# Patient Record
Sex: MALE | Race: WHITE | Employment: PART TIME | URBAN - METROPOLITAN AREA
[De-identification: names, ages, dates, MRNs, and addresses within clinical notes are randomized per-mention and may not be internally consistent; named-entity substitution may affect disease eponyms.]

---

## 2017-01-06 ENCOUNTER — HOSPITAL ENCOUNTER (OUTPATIENT)
Dept: GENERAL RADIOLOGY | Age: 57
Discharge: HOME | End: 2017-01-06
Attending: FAMILY MEDICINE | Admitting: FAMILY MEDICINE

## 2017-01-06 DIAGNOSIS — Z13.1 SCREENING FOR DIABETES MELLITUS (DM): ICD-10-CM

## 2017-01-06 DIAGNOSIS — Z11.4 SCREENING FOR HIV (HUMAN IMMUNODEFICIENCY VIRUS): ICD-10-CM

## 2017-01-06 DIAGNOSIS — Z11.59 NEED FOR HEPATITIS C SCREENING TEST: ICD-10-CM

## 2017-01-06 DIAGNOSIS — E78.2 MIXED HYPERLIPIDEMIA: ICD-10-CM

## 2017-01-06 LAB
A/G RATIO: 1.5 (ref 1.1–2.2)
ALBUMIN SERPL-MCNC: 4.5 G/DL (ref 3.4–5)
ALP BLD-CCNC: 70 U/L (ref 40–129)
ALT SERPL-CCNC: 27 U/L (ref 10–40)
ANION GAP SERPL CALCULATED.3IONS-SCNC: 13 MMOL/L (ref 3–16)
AST SERPL-CCNC: 23 U/L (ref 15–37)
BILIRUB SERPL-MCNC: 0.5 MG/DL (ref 0–1)
BUN BLDV-MCNC: 10 MG/DL (ref 7–20)
CALCIUM SERPL-MCNC: 9.2 MG/DL (ref 8.3–10.6)
CHLORIDE BLD-SCNC: 103 MMOL/L (ref 99–110)
CHOLESTEROL, TOTAL: 163 MG/DL (ref 0–199)
CO2: 27 MMOL/L (ref 21–32)
CREAT SERPL-MCNC: 0.9 MG/DL (ref 0.9–1.3)
GFR AFRICAN AMERICAN: >60
GFR NON-AFRICAN AMERICAN: >60
GLOBULIN: 3.1 G/DL
GLUCOSE BLD-MCNC: 96 MG/DL (ref 70–99)
HDLC SERPL-MCNC: 43 MG/DL (ref 40–60)
HEPATITIS C ANTIBODY INTERPRETATION: NORMAL
LDL CHOLESTEROL CALCULATED: 91 MG/DL
POTASSIUM SERPL-SCNC: 4.5 MMOL/L (ref 3.5–5.1)
SODIUM BLD-SCNC: 143 MMOL/L (ref 136–145)
TOTAL PROTEIN: 7.6 G/DL (ref 6.4–8.2)
TRIGL SERPL-MCNC: 145 MG/DL (ref 0–150)
VLDLC SERPL CALC-MCNC: 29 MG/DL

## 2017-01-07 LAB
ESTIMATED AVERAGE GLUCOSE: 108.3 MG/DL
HBA1C MFR BLD: 5.4 %

## 2017-01-09 LAB — HIV-1 AND HIV-2 ANTIBODIES: NORMAL

## 2017-01-13 ENCOUNTER — ANTI-COAG VISIT (OUTPATIENT)
Dept: PHARMACY | Facility: CLINIC | Age: 57
End: 2017-01-13

## 2017-01-13 DIAGNOSIS — Z79.01 LONG TERM CURRENT USE OF ANTICOAGULANT: ICD-10-CM

## 2017-01-13 LAB — INR BLD: 2

## 2017-02-10 ENCOUNTER — ANTI-COAG VISIT (OUTPATIENT)
Dept: PHARMACY | Facility: CLINIC | Age: 57
End: 2017-02-10

## 2017-02-10 DIAGNOSIS — Z79.01 LONG TERM CURRENT USE OF ANTICOAGULANT: ICD-10-CM

## 2017-02-10 LAB — INR BLD: 2.6

## 2017-02-10 RX ORDER — RANITIDINE 150 MG/1
150 TABLET ORAL 2 TIMES DAILY
COMMUNITY
End: 2019-11-05 | Stop reason: ALTCHOICE

## 2017-03-06 ENCOUNTER — EMPLOYEE WELLNESS (OUTPATIENT)
Dept: OTHER | Age: 57
End: 2017-03-06

## 2017-03-10 ENCOUNTER — ANTI-COAG VISIT (OUTPATIENT)
Dept: PHARMACY | Facility: CLINIC | Age: 57
End: 2017-03-10

## 2017-03-10 DIAGNOSIS — Z79.01 LONG TERM CURRENT USE OF ANTICOAGULANT: ICD-10-CM

## 2017-03-10 LAB — INR BLD: 2.7

## 2017-03-10 RX ORDER — WARFARIN SODIUM 5 MG/1
TABLET ORAL
Qty: 90 TABLET | Refills: 1 | Status: SHIPPED | OUTPATIENT
Start: 2017-03-10 | End: 2018-01-02 | Stop reason: SDUPTHER

## 2017-04-07 ENCOUNTER — ANTI-COAG VISIT (OUTPATIENT)
Dept: PHARMACY | Facility: CLINIC | Age: 57
End: 2017-04-07

## 2017-04-07 DIAGNOSIS — Z79.01 LONG TERM CURRENT USE OF ANTICOAGULANT: ICD-10-CM

## 2017-04-07 LAB — INR BLD: 2.9

## 2017-05-05 ENCOUNTER — ANTI-COAG VISIT (OUTPATIENT)
Dept: PHARMACY | Facility: CLINIC | Age: 57
End: 2017-05-05

## 2017-05-05 DIAGNOSIS — Z79.01 LONG TERM CURRENT USE OF ANTICOAGULANT: ICD-10-CM

## 2017-05-05 LAB — INR BLD: 2.4

## 2017-05-17 ENCOUNTER — OFFICE VISIT (OUTPATIENT)
Dept: INTERNAL MEDICINE CLINIC | Age: 57
End: 2017-05-17

## 2017-05-17 VITALS
TEMPERATURE: 99 F | HEART RATE: 88 BPM | OXYGEN SATURATION: 98 % | DIASTOLIC BLOOD PRESSURE: 100 MMHG | BODY MASS INDEX: 28.75 KG/M2 | SYSTOLIC BLOOD PRESSURE: 128 MMHG | WEIGHT: 230 LBS

## 2017-05-17 DIAGNOSIS — J06.9 VIRAL URI WITH COUGH: Primary | ICD-10-CM

## 2017-05-17 PROCEDURE — 99214 OFFICE O/P EST MOD 30 MIN: CPT | Performed by: FAMILY MEDICINE

## 2017-05-17 RX ORDER — BENZONATATE 100 MG/1
100-200 CAPSULE ORAL 3 TIMES DAILY PRN
Qty: 30 CAPSULE | Refills: 0 | Status: SHIPPED | OUTPATIENT
Start: 2017-05-17 | End: 2017-09-14 | Stop reason: ALTCHOICE

## 2017-05-21 ASSESSMENT — ENCOUNTER SYMPTOMS
BACK PAIN: 0
RHINORRHEA: 1
VOMITING: 0
COUGH: 1
EYE DISCHARGE: 0
SORE THROAT: 0
SINUS PRESSURE: 0
DIARRHEA: 0
NAUSEA: 0
CHEST TIGHTNESS: 0

## 2017-06-02 ENCOUNTER — ANTI-COAG VISIT (OUTPATIENT)
Dept: PHARMACY | Facility: CLINIC | Age: 57
End: 2017-06-02

## 2017-06-02 DIAGNOSIS — Z79.01 LONG TERM CURRENT USE OF ANTICOAGULANT: ICD-10-CM

## 2017-06-02 LAB — INR BLD: 2.3

## 2017-07-07 ENCOUNTER — ANTI-COAG VISIT (OUTPATIENT)
Dept: PHARMACY | Facility: CLINIC | Age: 57
End: 2017-07-07

## 2017-07-07 DIAGNOSIS — Z79.01 LONG TERM CURRENT USE OF ANTICOAGULANT: ICD-10-CM

## 2017-07-07 LAB — INR BLD: 2.6

## 2017-08-04 ENCOUNTER — ANTI-COAG VISIT (OUTPATIENT)
Dept: PHARMACY | Facility: CLINIC | Age: 57
End: 2017-08-04

## 2017-08-04 DIAGNOSIS — Z79.01 LONG TERM CURRENT USE OF ANTICOAGULANT: ICD-10-CM

## 2017-08-04 LAB — INR BLD: 2.5

## 2017-09-01 ENCOUNTER — ANTI-COAG VISIT (OUTPATIENT)
Dept: PHARMACY | Facility: CLINIC | Age: 57
End: 2017-09-01

## 2017-09-01 ENCOUNTER — HOSPITAL ENCOUNTER (OUTPATIENT)
Dept: OTHER | Age: 57
Discharge: HOME | End: 2017-09-30
Attending: FAMILY MEDICINE | Admitting: FAMILY MEDICINE

## 2017-09-01 DIAGNOSIS — I26.09 OTHER PULMONARY EMBOLISM WITH ACUTE COR PULMONALE, UNSPECIFIED CHRONICITY (HCC): ICD-10-CM

## 2017-09-01 DIAGNOSIS — Z79.01 LONG TERM CURRENT USE OF ANTICOAGULANT: ICD-10-CM

## 2017-09-01 LAB — INR BLD: 2.8

## 2017-09-14 ENCOUNTER — OFFICE VISIT (OUTPATIENT)
Dept: DERMATOLOGY | Age: 57
End: 2017-09-14

## 2017-09-14 DIAGNOSIS — D22.9 MULTIPLE BENIGN NEVI: ICD-10-CM

## 2017-09-14 DIAGNOSIS — Z12.83 SCREENING EXAM FOR SKIN CANCER: ICD-10-CM

## 2017-09-14 DIAGNOSIS — L91.8 SKIN TAG: ICD-10-CM

## 2017-09-14 DIAGNOSIS — L57.0 ACTINIC KERATOSES: ICD-10-CM

## 2017-09-14 DIAGNOSIS — L82.1 SEBORRHEIC KERATOSES: ICD-10-CM

## 2017-09-14 DIAGNOSIS — D48.5 NEOPLASM OF UNCERTAIN BEHAVIOR OF SKIN: Primary | ICD-10-CM

## 2017-09-14 PROCEDURE — 99243 OFF/OP CNSLTJ NEW/EST LOW 30: CPT | Performed by: DERMATOLOGY

## 2017-09-14 PROCEDURE — 17003 DESTRUCT PREMALG LES 2-14: CPT | Performed by: DERMATOLOGY

## 2017-09-14 PROCEDURE — 11100 PR BIOPSY OF SKIN LESION: CPT | Performed by: DERMATOLOGY

## 2017-09-14 PROCEDURE — 17000 DESTRUCT PREMALG LESION: CPT | Performed by: DERMATOLOGY

## 2017-09-28 ENCOUNTER — TELEPHONE (OUTPATIENT)
Dept: DERMATOLOGY | Age: 57
End: 2017-09-28

## 2017-10-06 ENCOUNTER — ANTI-COAG VISIT (OUTPATIENT)
Dept: PHARMACY | Facility: CLINIC | Age: 57
End: 2017-10-06

## 2017-10-06 LAB — INR BLD: 2.4

## 2017-10-06 NOTE — PROGRESS NOTES
Mr. Dakota Lora has hx of multiple PE s/p 11/2009. 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 He presents today w/out complaint. New onset PE on 6/3/12. Pt. denies any s/s bleeding/bruising/ swelling/SOB. No BRBPR. No melena. No missed doses. No Rx/OTC/Herbal supplement changes. No EtOH changes; pt states that he does not drink alcohol. No changes in diet. INR 2.4 is within therapeutic goal range of 2-3. Recommend to continue 5 mg daily. Pt has 5mg tablets. Will continue to monitor and check INR in 4 weeks. Dosing reminder card given with phone number, appointment date and time.    Return to clinic: 11/3 @ 10:00 am    Hal Morgan, PharmD 10/6/2017 10:01 AM

## 2017-11-01 ENCOUNTER — HOSPITAL ENCOUNTER (OUTPATIENT)
Dept: OTHER | Age: 57
Discharge: HOME | End: 2017-11-30
Attending: FAMILY MEDICINE | Admitting: FAMILY MEDICINE

## 2017-11-03 ENCOUNTER — ANTI-COAG VISIT (OUTPATIENT)
Dept: PHARMACY | Facility: CLINIC | Age: 57
End: 2017-11-03

## 2017-11-03 DIAGNOSIS — Z79.01 LONG TERM CURRENT USE OF ANTICOAGULANT: ICD-10-CM

## 2017-11-03 LAB — INR BLD: 2.5

## 2017-11-03 NOTE — PROGRESS NOTES
Mr. Dakota Lora has hx of multiple PE s/p 11/2009. 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 He presents today w/out complaint. New onset PE on 6/3/12. Pt. denies any s/s bleeding/bruising/ swelling/SOB. No BRBPR. No melena. No missed doses. No Rx/OTC/Herbal supplement changes. No EtOH changes; pt states that he does not drink alcohol. No changes in diet. INR 2.5 is within therapeutic goal range of 2-3. Recommend to continue 5 mg daily. Pt has 5mg tablets. Will continue to monitor and check INR in 4 weeks. Dosing reminder card given with phone number, appointment date and time.    Return to clinic: 12/1 @ 10:15 am

## 2017-12-01 ENCOUNTER — ANTI-COAG VISIT (OUTPATIENT)
Dept: PHARMACY | Facility: CLINIC | Age: 57
End: 2017-12-01

## 2017-12-01 ENCOUNTER — HOSPITAL ENCOUNTER (OUTPATIENT)
Dept: OTHER | Age: 57
Discharge: HOME | End: 2017-12-31
Attending: FAMILY MEDICINE | Admitting: FAMILY MEDICINE

## 2017-12-01 DIAGNOSIS — Z79.01 LONG TERM CURRENT USE OF ANTICOAGULANT: ICD-10-CM

## 2017-12-01 LAB — INR BLD: 2.6

## 2017-12-01 NOTE — PROGRESS NOTES
Mr. Rajan Reyes has hx of multiple PE s/p 11/2009. 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 He presents today w/out complaint. New onset PE on 6/3/12. Pt. denies any s/s bleeding/bruising/ swelling/SOB. No BRBPR. No melena. No missed doses. No Rx/OTC/Herbal supplement changes. No EtOH changes; pt states that he does not drink alcohol. No changes in diet. INR 2.5 is within therapeutic goal range of 2-3. Recommend to continue 5 mg daily. Pt has 5mg tablets. Will continue to monitor and check INR in 4 weeks. Dosing reminder card given with phone number, appointment date and time.    Return to clinic: 12/1 @ 10:15 am

## 2018-01-02 ENCOUNTER — OFFICE VISIT (OUTPATIENT)
Dept: INTERNAL MEDICINE CLINIC | Age: 58
End: 2018-01-02

## 2018-01-02 VITALS
BODY MASS INDEX: 28.01 KG/M2 | OXYGEN SATURATION: 96 % | WEIGHT: 230 LBS | SYSTOLIC BLOOD PRESSURE: 120 MMHG | HEART RATE: 72 BPM | HEIGHT: 76 IN | DIASTOLIC BLOOD PRESSURE: 76 MMHG

## 2018-01-02 DIAGNOSIS — E78.2 MIXED HYPERLIPIDEMIA: ICD-10-CM

## 2018-01-02 DIAGNOSIS — Z00.00 ROUTINE PHYSICAL EXAMINATION: Primary | ICD-10-CM

## 2018-01-02 DIAGNOSIS — Z86.718 PERSONAL HISTORY OF DVT (DEEP VEIN THROMBOSIS): ICD-10-CM

## 2018-01-02 DIAGNOSIS — K21.9 GASTROESOPHAGEAL REFLUX DISEASE WITHOUT ESOPHAGITIS: ICD-10-CM

## 2018-01-02 DIAGNOSIS — L98.9 SKIN LESION OF FOOT: ICD-10-CM

## 2018-01-02 DIAGNOSIS — Z79.01 LONG TERM CURRENT USE OF ANTICOAGULANT: ICD-10-CM

## 2018-01-02 DIAGNOSIS — Z12.5 SCREENING FOR PROSTATE CANCER: ICD-10-CM

## 2018-01-02 PROCEDURE — 99396 PREV VISIT EST AGE 40-64: CPT | Performed by: FAMILY MEDICINE

## 2018-01-02 RX ORDER — ATORVASTATIN CALCIUM 40 MG/1
TABLET, FILM COATED ORAL
Qty: 90 TABLET | Refills: 3 | Status: SHIPPED | OUTPATIENT
Start: 2018-01-02 | End: 2019-01-08 | Stop reason: SDUPTHER

## 2018-01-02 RX ORDER — WARFARIN SODIUM 5 MG/1
5 TABLET ORAL DAILY
Qty: 90 TABLET | Refills: 3 | Status: SHIPPED | OUTPATIENT
Start: 2018-01-02 | End: 2018-10-01 | Stop reason: SDUPTHER

## 2018-01-02 ASSESSMENT — ENCOUNTER SYMPTOMS
VOMITING: 0
DIARRHEA: 0
SHORTNESS OF BREATH: 1
ABDOMINAL PAIN: 0
CONSTIPATION: 0
RHINORRHEA: 0
SORE THROAT: 0
NAUSEA: 0
WHEEZING: 0
BACK PAIN: 0
COUGH: 0
TROUBLE SWALLOWING: 0

## 2018-01-02 NOTE — PATIENT INSTRUCTIONS
Try taking zantac 300 mg in the am (instead of 150 mg twice daily)  Can take an additional prilosec 20 mg if needed for a couple weeks    Go for fasting bloodwork on Friday

## 2018-01-05 ENCOUNTER — HOSPITAL ENCOUNTER (OUTPATIENT)
Dept: OTHER | Age: 58
Discharge: HOME | End: 2018-01-31
Attending: FAMILY MEDICINE | Admitting: FAMILY MEDICINE

## 2018-01-05 ENCOUNTER — ANTI-COAG VISIT (OUTPATIENT)
Dept: PHARMACY | Facility: CLINIC | Age: 58
End: 2018-01-05

## 2018-01-05 ENCOUNTER — HOSPITAL ENCOUNTER (OUTPATIENT)
Dept: GENERAL RADIOLOGY | Age: 58
Discharge: HOME | End: 2018-01-05
Attending: FAMILY MEDICINE | Admitting: FAMILY MEDICINE

## 2018-01-05 DIAGNOSIS — Z79.01 LONG TERM CURRENT USE OF ANTICOAGULANT: ICD-10-CM

## 2018-01-05 DIAGNOSIS — Z00.00 ROUTINE PHYSICAL EXAMINATION: ICD-10-CM

## 2018-01-05 DIAGNOSIS — Z12.5 SCREENING FOR PROSTATE CANCER: ICD-10-CM

## 2018-01-05 LAB
A/G RATIO: 1.4 (ref 1.1–2.2)
ALBUMIN SERPL-MCNC: 4.7 G/DL (ref 3.4–5)
ALP BLD-CCNC: 73 U/L (ref 40–129)
ALT SERPL-CCNC: 26 U/L (ref 10–40)
ANION GAP SERPL CALCULATED.3IONS-SCNC: 13 MMOL/L (ref 3–16)
AST SERPL-CCNC: 23 U/L (ref 15–37)
BILIRUB SERPL-MCNC: 0.6 MG/DL (ref 0–1)
BUN BLDV-MCNC: 12 MG/DL (ref 7–20)
CALCIUM SERPL-MCNC: 9.7 MG/DL (ref 8.3–10.6)
CHLORIDE BLD-SCNC: 103 MMOL/L (ref 99–110)
CHOLESTEROL, TOTAL: 169 MG/DL (ref 0–199)
CO2: 26 MMOL/L (ref 21–32)
CREAT SERPL-MCNC: 0.8 MG/DL (ref 0.9–1.3)
GFR AFRICAN AMERICAN: >60
GFR NON-AFRICAN AMERICAN: >60
GLOBULIN: 3.4 G/DL
GLUCOSE BLD-MCNC: 84 MG/DL (ref 70–99)
HDLC SERPL-MCNC: 44 MG/DL (ref 40–60)
INR BLD: 2.5
LDL CHOLESTEROL CALCULATED: 94 MG/DL
POTASSIUM SERPL-SCNC: 4.3 MMOL/L (ref 3.5–5.1)
PROSTATE SPECIFIC ANTIGEN: 0.64 NG/ML (ref 0–4)
SODIUM BLD-SCNC: 142 MMOL/L (ref 136–145)
TOTAL PROTEIN: 8.1 G/DL (ref 6.4–8.2)
TRIGL SERPL-MCNC: 157 MG/DL (ref 0–150)
VLDLC SERPL CALC-MCNC: 31 MG/DL

## 2018-01-05 NOTE — PROGRESS NOTES
Mr. Aracelis Castañeda has hx of multiple PE s/p 11/2009. 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 He presents today w/out complaint. New onset PE on 6/3/12. Pt. denies any s/s bleeding/bruising/ swelling/SOB. No BRBPR. No melena. No missed doses. No Rx/OTC/Herbal supplement changes. No EtOH changes; pt states that he does not drink alcohol. No changes in diet. INR 2.5 is within therapeutic goal range of 2-3. Recommend to continue 5 mg daily. Pt has 5mg tablets. Will continue to monitor and check INR in 4 weeks. Dosing reminder card given with phone number, appointment date and time. Return to clinic: 2/2 at 21 [phone]     I have seen the patient and reviewed the progress note written by the PharmD Candidate. I agree with this assessment and plan.    Jh Chavez, PharmD 1/5/2018 11:07 AM

## 2018-02-01 ENCOUNTER — HOSPITAL ENCOUNTER (OUTPATIENT)
Dept: OTHER | Age: 58
Discharge: HOME | End: 2018-02-28
Attending: FAMILY MEDICINE | Admitting: FAMILY MEDICINE

## 2018-02-02 ENCOUNTER — ANTI-COAG VISIT (OUTPATIENT)
Dept: PHARMACY | Facility: CLINIC | Age: 58
End: 2018-02-02

## 2018-02-02 DIAGNOSIS — I26.99 OTHER PULMONARY EMBOLISM WITHOUT ACUTE COR PULMONALE, UNSPECIFIED CHRONICITY (HCC): ICD-10-CM

## 2018-02-02 DIAGNOSIS — Z79.01 LONG TERM CURRENT USE OF ANTICOAGULANT: ICD-10-CM

## 2018-02-02 LAB — INR BLD: 2.6

## 2018-03-01 ENCOUNTER — HOSPITAL ENCOUNTER (OUTPATIENT)
Dept: OTHER | Age: 58
Discharge: HOME | End: 2018-03-31
Attending: FAMILY MEDICINE | Admitting: FAMILY MEDICINE

## 2018-03-09 ENCOUNTER — ANTI-COAG VISIT (OUTPATIENT)
Dept: PHARMACY | Facility: CLINIC | Age: 58
End: 2018-03-09

## 2018-03-09 LAB — INR BLD: 2.8

## 2018-03-09 NOTE — PROGRESS NOTES
Mr. Daryl Lloyd has hx of multiple PE s/p 11/2009. 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 He presents today w/out complaint. New onset PE on 6/3/12. Pt. denies any s/s bleeding/bruising/ swelling/SOB. No BRBPR. No melena. No missed doses. No Rx/OTC/Herbal supplement changes. No EtOH changes; pt states that he does not drink alcohol. No changes in diet. Reports no changes with medications or diet since last visit. INR 2.8 is within therapeutic goal range of 2-3. Recommend to continue 5 mg daily. Pt has 5 mg tablets. Will continue to monitor and check INR in 4 weeks. Dosing reminder card given with phone number, appointment date and time.    Return to clinic: 4/6 @ 1:15pm

## 2018-03-20 VITALS — WEIGHT: 234 LBS | BODY MASS INDEX: 29.25 KG/M2

## 2018-04-01 ENCOUNTER — HOSPITAL ENCOUNTER (OUTPATIENT)
Dept: OTHER | Age: 58
Discharge: HOME | End: 2018-04-30
Attending: FAMILY MEDICINE | Admitting: FAMILY MEDICINE

## 2018-04-02 ENCOUNTER — EMPLOYEE WELLNESS (OUTPATIENT)
Dept: OTHER | Age: 58
End: 2018-04-02

## 2018-04-02 LAB
CHOLESTEROL, TOTAL: 153 MG/DL (ref 0–199)
GLUCOSE BLD-MCNC: 102 MG/DL (ref 70–99)
HDLC SERPL-MCNC: 40 MG/DL (ref 40–60)
LDL CHOLESTEROL CALCULATED: 86 MG/DL
TRIGL SERPL-MCNC: 135 MG/DL (ref 0–150)

## 2018-04-06 ENCOUNTER — ANTI-COAG VISIT (OUTPATIENT)
Dept: PHARMACY | Facility: CLINIC | Age: 58
End: 2018-04-06

## 2018-04-06 DIAGNOSIS — I26.99 OTHER PULMONARY EMBOLISM WITHOUT ACUTE COR PULMONALE, UNSPECIFIED CHRONICITY (HCC): ICD-10-CM

## 2018-04-06 DIAGNOSIS — Z79.01 LONG TERM CURRENT USE OF ANTICOAGULANT: ICD-10-CM

## 2018-04-06 LAB — INR BLD: 3.1

## 2018-04-10 VITALS — WEIGHT: 234 LBS | BODY MASS INDEX: 28.48 KG/M2

## 2018-05-01 ENCOUNTER — HOSPITAL ENCOUNTER (OUTPATIENT)
Dept: OTHER | Age: 58
Discharge: HOME | End: 2018-05-31
Attending: FAMILY MEDICINE | Admitting: FAMILY MEDICINE

## 2018-05-04 ENCOUNTER — ANTI-COAG VISIT (OUTPATIENT)
Dept: PHARMACY | Facility: CLINIC | Age: 58
End: 2018-05-04

## 2018-05-04 DIAGNOSIS — Z79.01 LONG TERM CURRENT USE OF ANTICOAGULANT: ICD-10-CM

## 2018-05-04 LAB — INR BLD: 3

## 2018-06-01 ENCOUNTER — ANTI-COAG VISIT (OUTPATIENT)
Dept: PHARMACY | Facility: CLINIC | Age: 58
End: 2018-06-01

## 2018-06-01 ENCOUNTER — HOSPITAL ENCOUNTER (OUTPATIENT)
Dept: OTHER | Age: 58
Discharge: HOME | End: 2018-06-30
Attending: FAMILY MEDICINE | Admitting: FAMILY MEDICINE

## 2018-06-01 DIAGNOSIS — Z79.01 LONG TERM CURRENT USE OF ANTICOAGULANT: ICD-10-CM

## 2018-06-01 LAB — INR BLD: 2.7

## 2018-06-27 ENCOUNTER — OFFICE VISIT (OUTPATIENT)
Dept: INTERNAL MEDICINE CLINIC | Age: 58
End: 2018-06-27

## 2018-06-27 ENCOUNTER — TELEPHONE (OUTPATIENT)
Dept: INTERNAL MEDICINE CLINIC | Age: 58
End: 2018-06-27

## 2018-06-27 VITALS
HEART RATE: 86 BPM | WEIGHT: 228 LBS | SYSTOLIC BLOOD PRESSURE: 122 MMHG | DIASTOLIC BLOOD PRESSURE: 82 MMHG | BODY MASS INDEX: 27.75 KG/M2 | OXYGEN SATURATION: 98 %

## 2018-06-27 DIAGNOSIS — Z86.718 PERSONAL HISTORY OF DVT (DEEP VEIN THROMBOSIS): ICD-10-CM

## 2018-06-27 DIAGNOSIS — M94.0 ACUTE COSTOCHONDRITIS: ICD-10-CM

## 2018-06-27 DIAGNOSIS — Z79.01 LONG TERM CURRENT USE OF ANTICOAGULANT: ICD-10-CM

## 2018-06-27 DIAGNOSIS — J06.9 VIRAL URI WITH COUGH: Primary | ICD-10-CM

## 2018-06-27 LAB
INTERNATIONAL NORMALIZATION RATIO, POC: 2.6
PROTHROMBIN TIME, POC: NORMAL

## 2018-06-27 PROCEDURE — 85610 PROTHROMBIN TIME: CPT | Performed by: FAMILY MEDICINE

## 2018-06-27 PROCEDURE — 99214 OFFICE O/P EST MOD 30 MIN: CPT | Performed by: FAMILY MEDICINE

## 2018-06-27 RX ORDER — BENZONATATE 100 MG/1
100-200 CAPSULE ORAL 3 TIMES DAILY PRN
Qty: 30 CAPSULE | Refills: 0 | Status: SHIPPED | OUTPATIENT
Start: 2018-06-27 | End: 2018-07-27 | Stop reason: ALTCHOICE

## 2018-06-27 RX ORDER — PREDNISONE 20 MG/1
40 TABLET ORAL DAILY
Qty: 10 TABLET | Refills: 0 | Status: SHIPPED | OUTPATIENT
Start: 2018-06-27 | End: 2018-07-02

## 2018-06-27 RX ORDER — PREDNISONE 20 MG/1
40 TABLET ORAL DAILY
Qty: 10 TABLET | Refills: 0 | Status: SHIPPED | OUTPATIENT
Start: 2018-06-27 | End: 2018-06-27 | Stop reason: SDUPTHER

## 2018-06-27 ASSESSMENT — PATIENT HEALTH QUESTIONNAIRE - PHQ9
2. FEELING DOWN, DEPRESSED OR HOPELESS: 0
SUM OF ALL RESPONSES TO PHQ QUESTIONS 1-9: 0
SUM OF ALL RESPONSES TO PHQ9 QUESTIONS 1 & 2: 0
1. LITTLE INTEREST OR PLEASURE IN DOING THINGS: 0

## 2018-06-27 NOTE — PROGRESS NOTES
HENT:   Head: Normocephalic and atraumatic. Right Ear: External ear normal.   Left Ear: External ear normal.   Mouth/Throat: Oropharynx is clear and moist. No oropharyngeal exudate. Boggy nasal turbinates   Eyes: Conjunctivae are normal. Right eye exhibits no discharge. Left eye exhibits no discharge. Neck: Normal range of motion. Neck supple. Cardiovascular: Normal rate, regular rhythm and normal heart sounds. Pulmonary/Chest: Effort normal and breath sounds normal. No respiratory distress. He exhibits tenderness (along right side of sternum and in intercostal spaces on the right). Abdominal: Soft. Bowel sounds are normal. There is no tenderness. Musculoskeletal: Normal range of motion. Lymphadenopathy:     He has no cervical adenopathy. Neurological: He is alert and oriented to person, place, and time. Skin: Skin is warm and dry. No rash noted. He is not diaphoretic. Psychiatric: He has a normal mood and affect. His behavior is normal. Judgment and thought content normal.   Vitals reviewed. Vitals:    06/27/18 1423   BP: 122/82   Site: Right Arm   Position: Sitting   Cuff Size: Large Adult   Pulse: 86   SpO2: 98%   Weight: 228 lb (103.4 kg)     Body mass index is 27.75 kg/m². Assessment/Plan:    1. Viral URI with cough  Rec supportive therapy with fluids, nasal saline, and OTC analgesics/antipyretics prn,  -return if sx's worsen after initial improvement and/or last longer than 7-10 days  -gave note for work    - benzonatate (TESSALON PERLES) 100 MG capsule; Take 1-2 capsules by mouth 3 times daily as needed for Cough  Dispense: 30 capsule; Refill: 0    2. Acute costochondritis  Reassured him that this is unrelated to previous surgery  Rec tylenol for pain (need to avoid NSAIDs with coumadin use)   Call back if pain worsens and I can send an Rx for oral steroid    3. Personal history of DVT (deep vein thrombosis)  4.  Long term current use of anticoagulant  - POCT INR was

## 2018-06-28 ENCOUNTER — TELEPHONE (OUTPATIENT)
Dept: INTERNAL MEDICINE CLINIC | Age: 58
End: 2018-06-28

## 2018-07-01 ENCOUNTER — HOSPITAL ENCOUNTER (OUTPATIENT)
Dept: OTHER | Age: 58
Discharge: HOME | End: 2018-07-01
Attending: FAMILY MEDICINE | Admitting: FAMILY MEDICINE

## 2018-07-02 ASSESSMENT — ENCOUNTER SYMPTOMS
NAUSEA: 0
EYE DISCHARGE: 0
COUGH: 1
SINUS PRESSURE: 0
DIARRHEA: 0
VOMITING: 0
SORE THROAT: 0
RHINORRHEA: 1
CHEST TIGHTNESS: 1
CHOKING: 0

## 2018-07-06 ENCOUNTER — ANTI-COAG VISIT (OUTPATIENT)
Dept: PHARMACY | Facility: CLINIC | Age: 58
End: 2018-07-06

## 2018-07-06 DIAGNOSIS — I27.82 OTHER CHRONIC PULMONARY EMBOLISM WITHOUT ACUTE COR PULMONALE (HCC): ICD-10-CM

## 2018-07-06 DIAGNOSIS — Z79.01 LONG TERM CURRENT USE OF ANTICOAGULANT: ICD-10-CM

## 2018-07-06 LAB — INR BLD: 3.6

## 2018-07-20 ENCOUNTER — ANTI-COAG VISIT (OUTPATIENT)
Dept: PHARMACY | Age: 58
End: 2018-07-20
Payer: COMMERCIAL

## 2018-07-20 DIAGNOSIS — I26.99 OTHER PULMONARY EMBOLISM WITHOUT ACUTE COR PULMONALE, UNSPECIFIED CHRONICITY (HCC): ICD-10-CM

## 2018-07-20 DIAGNOSIS — Z79.01 LONG TERM CURRENT USE OF ANTICOAGULANT: ICD-10-CM

## 2018-07-20 LAB — INR BLD: 2.7

## 2018-07-20 PROCEDURE — 99211 OFF/OP EST MAY X REQ PHY/QHP: CPT

## 2018-07-20 PROCEDURE — 85610 PROTHROMBIN TIME: CPT

## 2018-07-27 ENCOUNTER — APPOINTMENT (OUTPATIENT)
Dept: CT IMAGING | Age: 58
End: 2018-07-27
Payer: COMMERCIAL

## 2018-07-27 ENCOUNTER — HOSPITAL ENCOUNTER (EMERGENCY)
Age: 58
Discharge: HOME | End: 2018-07-27
Attending: EMERGENCY MEDICINE
Payer: COMMERCIAL

## 2018-07-27 ENCOUNTER — APPOINTMENT (OUTPATIENT)
Dept: GENERAL RADIOLOGY | Age: 58
End: 2018-07-27
Payer: COMMERCIAL

## 2018-07-27 VITALS
DIASTOLIC BLOOD PRESSURE: 89 MMHG | HEIGHT: 76 IN | HEART RATE: 70 BPM | RESPIRATION RATE: 16 BRPM | BODY MASS INDEX: 27.76 KG/M2 | WEIGHT: 228 LBS | OXYGEN SATURATION: 97 % | TEMPERATURE: 98.9 F | SYSTOLIC BLOOD PRESSURE: 127 MMHG

## 2018-07-27 DIAGNOSIS — R07.9 CHEST PAIN, UNSPECIFIED TYPE: ICD-10-CM

## 2018-07-27 DIAGNOSIS — F41.0 ANXIETY ATTACK: Primary | ICD-10-CM

## 2018-07-27 LAB
A/G RATIO: 1.4 (ref 1.1–2.2)
ALBUMIN SERPL-MCNC: 4.3 G/DL (ref 3.4–5)
ALP BLD-CCNC: 74 U/L (ref 40–129)
ALT SERPL-CCNC: 26 U/L (ref 10–40)
ANION GAP SERPL CALCULATED.3IONS-SCNC: 14 MMOL/L (ref 3–16)
AST SERPL-CCNC: 22 U/L (ref 15–37)
BASOPHILS ABSOLUTE: 0 K/UL (ref 0–0.2)
BASOPHILS RELATIVE PERCENT: 0.7 %
BILIRUB SERPL-MCNC: 0.5 MG/DL (ref 0–1)
BUN BLDV-MCNC: 10 MG/DL (ref 7–20)
CALCIUM SERPL-MCNC: 8.9 MG/DL (ref 8.3–10.6)
CHLORIDE BLD-SCNC: 105 MMOL/L (ref 99–110)
CO2: 23 MMOL/L (ref 21–32)
CREAT SERPL-MCNC: 0.7 MG/DL (ref 0.9–1.3)
EKG ATRIAL RATE: 85 BPM
EKG DIAGNOSIS: NORMAL
EKG P AXIS: 27 DEGREES
EKG P-R INTERVAL: 148 MS
EKG Q-T INTERVAL: 364 MS
EKG QRS DURATION: 84 MS
EKG QTC CALCULATION (BAZETT): 433 MS
EKG R AXIS: -8 DEGREES
EKG T AXIS: 45 DEGREES
EKG VENTRICULAR RATE: 85 BPM
EOSINOPHILS ABSOLUTE: 0.2 K/UL (ref 0–0.6)
EOSINOPHILS RELATIVE PERCENT: 2.4 %
GFR AFRICAN AMERICAN: >60
GFR NON-AFRICAN AMERICAN: >60
GLOBULIN: 3.1 G/DL
GLUCOSE BLD-MCNC: 93 MG/DL (ref 70–99)
HCT VFR BLD CALC: 47.5 % (ref 40.5–52.5)
HEMOGLOBIN: 16.5 G/DL (ref 13.5–17.5)
INR BLD: 2.32 (ref 0.86–1.14)
LYMPHOCYTES ABSOLUTE: 2 K/UL (ref 1–5.1)
LYMPHOCYTES RELATIVE PERCENT: 30.1 %
MCH RBC QN AUTO: 31.3 PG (ref 26–34)
MCHC RBC AUTO-ENTMCNC: 34.7 G/DL (ref 31–36)
MCV RBC AUTO: 90.3 FL (ref 80–100)
MONOCYTES ABSOLUTE: 0.5 K/UL (ref 0–1.3)
MONOCYTES RELATIVE PERCENT: 8.2 %
NEUTROPHILS ABSOLUTE: 3.8 K/UL (ref 1.7–7.7)
NEUTROPHILS RELATIVE PERCENT: 58.6 %
PDW BLD-RTO: 13.1 % (ref 12.4–15.4)
PLATELET # BLD: 257 K/UL (ref 135–450)
PMV BLD AUTO: 8.7 FL (ref 5–10.5)
POTASSIUM SERPL-SCNC: 3.9 MMOL/L (ref 3.5–5.1)
PRO-BNP: 15 PG/ML (ref 0–124)
PROTHROMBIN TIME: 26.4 SEC (ref 9.8–13)
RBC # BLD: 5.26 M/UL (ref 4.2–5.9)
SODIUM BLD-SCNC: 142 MMOL/L (ref 136–145)
TOTAL PROTEIN: 7.4 G/DL (ref 6.4–8.2)
TROPONIN: <0.01 NG/ML
TROPONIN: <0.01 NG/ML
WBC # BLD: 6.5 K/UL (ref 4–11)

## 2018-07-27 PROCEDURE — 6370000000 HC RX 637 (ALT 250 FOR IP): Performed by: PHYSICIAN ASSISTANT

## 2018-07-27 PROCEDURE — 71046 X-RAY EXAM CHEST 2 VIEWS: CPT

## 2018-07-27 PROCEDURE — 93005 ELECTROCARDIOGRAM TRACING: CPT | Performed by: PHYSICIAN ASSISTANT

## 2018-07-27 PROCEDURE — 70450 CT HEAD/BRAIN W/O DYE: CPT

## 2018-07-27 PROCEDURE — 71275 CT ANGIOGRAPHY CHEST: CPT

## 2018-07-27 PROCEDURE — 96361 HYDRATE IV INFUSION ADD-ON: CPT

## 2018-07-27 PROCEDURE — 93010 ELECTROCARDIOGRAM REPORT: CPT | Performed by: INTERNAL MEDICINE

## 2018-07-27 PROCEDURE — 99284 EMERGENCY DEPT VISIT MOD MDM: CPT

## 2018-07-27 PROCEDURE — 96360 HYDRATION IV INFUSION INIT: CPT

## 2018-07-27 PROCEDURE — 80053 COMPREHEN METABOLIC PANEL: CPT

## 2018-07-27 PROCEDURE — 93005 ELECTROCARDIOGRAM TRACING: CPT | Performed by: EMERGENCY MEDICINE

## 2018-07-27 PROCEDURE — 2580000003 HC RX 258: Performed by: PHYSICIAN ASSISTANT

## 2018-07-27 PROCEDURE — 85025 COMPLETE CBC W/AUTO DIFF WBC: CPT

## 2018-07-27 PROCEDURE — 36415 COLL VENOUS BLD VENIPUNCTURE: CPT

## 2018-07-27 PROCEDURE — 83880 ASSAY OF NATRIURETIC PEPTIDE: CPT

## 2018-07-27 PROCEDURE — 6360000004 HC RX CONTRAST MEDICATION: Performed by: EMERGENCY MEDICINE

## 2018-07-27 PROCEDURE — 6370000000 HC RX 637 (ALT 250 FOR IP): Performed by: EMERGENCY MEDICINE

## 2018-07-27 PROCEDURE — 85610 PROTHROMBIN TIME: CPT

## 2018-07-27 PROCEDURE — 84484 ASSAY OF TROPONIN QUANT: CPT

## 2018-07-27 RX ORDER — HYDROXYZINE HYDROCHLORIDE 25 MG/1
25 TABLET, FILM COATED ORAL 3 TIMES DAILY PRN
Qty: 21 TABLET | Refills: 0 | Status: SHIPPED | OUTPATIENT
Start: 2018-07-27 | End: 2018-08-06

## 2018-07-27 RX ORDER — ASPIRIN 81 MG/1
324 TABLET, CHEWABLE ORAL ONCE
Status: COMPLETED | OUTPATIENT
Start: 2018-07-27 | End: 2018-07-27

## 2018-07-27 RX ORDER — 0.9 % SODIUM CHLORIDE 0.9 %
1000 INTRAVENOUS SOLUTION INTRAVENOUS ONCE
Status: COMPLETED | OUTPATIENT
Start: 2018-07-27 | End: 2018-07-27

## 2018-07-27 RX ORDER — HYDROXYZINE PAMOATE 25 MG/1
25 CAPSULE ORAL ONCE
Status: COMPLETED | OUTPATIENT
Start: 2018-07-27 | End: 2018-07-27

## 2018-07-27 RX ORDER — OMEPRAZOLE 10 MG/1
10 CAPSULE, DELAYED RELEASE ORAL DAILY
COMMUNITY

## 2018-07-27 RX ADMIN — IOPAMIDOL 75 ML: 755 INJECTION, SOLUTION INTRAVENOUS at 16:41

## 2018-07-27 RX ADMIN — ASPIRIN 324 MG: 81 TABLET, CHEWABLE ORAL at 16:26

## 2018-07-27 RX ADMIN — SODIUM CHLORIDE 1000 ML: 9 INJECTION, SOLUTION INTRAVENOUS at 16:26

## 2018-07-27 RX ADMIN — HYDROXYZINE PAMOATE 25 MG: 25 CAPSULE ORAL at 16:27

## 2018-07-27 ASSESSMENT — PAIN SCALES - GENERAL: PAINLEVEL_OUTOF10: 6

## 2018-07-27 NOTE — ED PROVIDER NOTES
Past Medical History:   Diagnosis Date    Deep vein thrombosis (DVT) (HCC) in legs and traveled to lungs    FH: CAD (coronary artery disease) 1/8/2014    Former smoker 1/8/2014    GERD (gastroesophageal reflux disease)     High cholesterol     Hx of blood clots     Lung mass     Lung nodule 6/3/2012    histoplasmosis    S/P thoracotomy 7/9/2012    Right thoracotomy, RUL excisional biopsy of nodule with FS, 5 level intercostal nerve block              SURGICAL HISTORY       Past Surgical History:   Procedure Laterality Date    APPENDECTOMY      1996    COLONOSCOPY      CORONARY ANGIOPLASTY  05/12/14    WNL  - no stents    ENDOSCOPY, COLON, DIAGNOSTIC      EYE SURGERY      laser surgery     KNEE ARTHROPLASTY  5 years ago so 2007    LEFT X2    THORACOTOMY  7-9-2012    Right thoracotomy, right upper lobe excisional biopsy with frozen section 5 level intercostal nerve block    TONSILLECTOMY           CURRENT MEDICATIONS       Discharge Medication List as of 7/27/2018  7:44 PM      CONTINUE these medications which have NOT CHANGED    Details   omeprazole (PRILOSEC) 10 MG delayed release capsule Take 10 mg by mouth dailyHistorical Med      atorvastatin (LIPITOR) 40 MG tablet TAKE ONE TABLET BY MOUTH NIGHTLY, Disp-90 tablet, R-3Normal      warfarin (COUMADIN) 5 MG tablet Take 1 tablet by mouth daily, Disp-90 tablet, R-3Normal      ranitidine (ZANTAC) 150 MG tablet Take 150 mg by mouth 2 times daily Historical Med      albuterol sulfate HFA (PROAIR HFA) 108 (90 BASE) MCG/ACT inhaler Inhale 2 puffs into the lungs every 6 hours as needed for Wheezing, Disp-1 Inhaler, R-3               ALLERGIES     Bee venom; Nutritional supplements; and Penicillins    FAMILY HISTORY       Family History   Problem Relation Age of Onset    Diabetes Mother     Parkinsonism Mother     Cancer Mother         MASTECTOMY, BREAST CA, SPREAD    Heart Disease Father         CHF    Heart Disease Brother         CABG 3 VESSELLS  Other Brother         CHROHN'S DISEASE          SOCIAL HISTORY       Social History     Social History    Marital status:      Spouse name: N/A    Number of children: N/A    Years of education: N/A     Social History Main Topics    Smoking status: Former Smoker     Packs/day: 1.00     Years: 25.00     Types: Cigarettes     Quit date: 6/2/2000    Smokeless tobacco: Never Used    Alcohol use No    Drug use: No    Sexual activity: Yes     Partners: Female     Other Topics Concern    None     Social History Narrative    None       SCREENINGS             PHYSICAL EXAM    (up to 7 for level 4, 8 or more for level 5)     ED Triage Vitals [07/27/18 1426]   BP Temp Temp Source Pulse Resp SpO2 Height Weight   (!) 151/95 98.7 °F (37.1 °C) Oral 81 18 98 % 6' 4\" (1.93 m) 228 lb (103.4 kg)       Physical Exam    GENERAL APPEARANCE: Awake and alert. Cooperative. No acute distress. Afebrile. Non-toxic appearing. HEAD: Normocephalic. Atraumatic. EYES: PERRL. EOM's grossly intact. ENT: Mucous membranes are moist. No lesions or ulcerations noted in the oral cavity. No enlarged tonsils, no exudates noted. Midline uvula. No drooling noted. No nasal flaring. Cerumen noted in right ear canal, nonerythematous left tympanic membrane. Swallowing intact. Airway intact. NECK: Supple. Normal ROM. CHEST: Equal symmetric chest rise. S1 and S2 present. No rubs or gallops noted. LUNGS: Breathing is unlabored. Speaking comfortably in full sentences. Lungs are clear to auscultation bilaterally. No wheezing, rales or rhonchi noted. Abdomen: Nondistended, soft, non-rigid, nontender palpation. Bowel sounds present. EXTREMITIES: MAEE. No acute deformities, crepitus or step-offs noted. Soft compartments. Nontender palpation posterior calves. SKIN: Warm and dry. No rashes noted. NEUROLOGICAL: Alert and oriented. Strength is 5/5 in all extremities and sensation is intact. The patient can dorsiflex, plantarflex, raise normal range or not returned as of this dictation. EKG: All EKG's are interpreted by the Emergency Department Physician who either signs or Co-signs this chart in the absence of a cardiologist.  Please see their note for interpretation of EKG. RADIOLOGY:   Non-plain film images such as CT, Ultrasound and MRI are read by the radiologist. Plain radiographic images are visualized and preliminarily interpreted by the  ED Provider with the below findings:        Interpretation per the Radiologist below, if available at the time of this note:    CTA PULMONARY W CONTRAST   Final Result   No pulmonary embolus is identified. No acute cardiopulmonary disease. Postsurgical fibrosis in the right upper lung. CT Head WO Contrast   Final Result   No acute intracranial abnormality. XR CHEST STANDARD (2 VW)   Final Result   No acute cardiopulmonary disease. Xr Chest Standard (2 Vw)    Result Date: 7/27/2018  EXAMINATION: TWO VIEWS OF THE CHEST 7/27/2018 3:12 pm COMPARISON: 05/09/2014 HISTORY: ORDERING SYSTEM PROVIDED HISTORY: chest pain TECHNOLOGIST PROVIDED HISTORY: Reason for exam:->chest pain Ordering Physician Provided Reason for Exam: Anxiety (pt anxious about son being evicted from his apartment. ) FINDINGS: Cardiac silhouette, mediastinal and hilar contours are normal.  Left hemidiaphragm is elevated. There is no pleural effusion or focal airspace disease. No acute osseous abnormality is identified. No acute cardiopulmonary disease. Ct Head Wo Contrast    Result Date: 7/27/2018  EXAMINATION: CT OF THE HEAD WITHOUT CONTRAST  7/27/2018 4:36 pm TECHNIQUE: CT of the head was performed without the administration of intravenous contrast. Dose modulation, iterative reconstruction, and/or weight based adjustment of the mA/kV was utilized to reduce the radiation dose to as low as reasonably achievable. COMPARISON: None.  HISTORY: ORDERING SYSTEM PROVIDED HISTORY: headache, dizziness/lightheadedness TECHNOLOGIST PROVIDED HISTORY: Has a \"code stroke\" or \"stroke alert\" been called? ->No Ordering Physician Provided Reason for Exam: headache, dizziness/lightheadedness Acuity: Unknown Type of Exam: Unknown FINDINGS: BRAIN/VENTRICLES: There is no acute intracranial hemorrhage, mass effect or midline shift. No abnormal extra-axial fluid collection. The gray-white differentiation is maintained without evidence of an acute infarct. There is no evidence of hydrocephalus. ORBITS: The visualized portion of the orbits demonstrate no acute abnormality. SINUSES: The visualized paranasal sinuses and mastoid air cells demonstrate no acute abnormality. SOFT TISSUES/SKULL:  No acute abnormality of the visualized skull or soft tissues. No acute intracranial abnormality. Cta Pulmonary W Contrast    Result Date: 7/27/2018  EXAMINATION: CTA OF THE CHEST 7/27/2018 4:42 pm TECHNIQUE: CTA of the chest was performed after the administration of intravenous contrast.  Multiplanar reformatted images are provided for review. MIP images are provided for review. Dose modulation, iterative reconstruction, and/or weight based adjustment of the mA/kV was utilized to reduce the radiation dose to as low as reasonably achievable. COMPARISON: None. HISTORY: ORDERING SYSTEM PROVIDED HISTORY: chest pain, hx PE/DVT, SOB TECHNOLOGIST PROVIDED HISTORY: Ordering Physician Provided Reason for Exam: chest pain Acuity: Unknown Type of Exam: Unknown Additional signs and symptoms: sob Relevant Medical/Surgical History: history of dvt, THORACOTOMY FINDINGS: Pulmonary Arteries: Pulmonary arteries are adequately opacified for evaluation. No evidence of intraluminal filling defect to suggest pulmonary embolism. Main pulmonary artery is normal in caliber. Mediastinum: No evidence of mediastinal lymphadenopathy. The heart and pericardium demonstrate no acute abnormality. There is no acute abnormality of the thoracic aorta. Lungs/pleura: There is a band- like opacity in the right upper lung, consistent with fibrosis. No pneumothorax or pleural effusion is identified. Upper Abdomen: There is a moderate size sliding hiatal hernia. Soft Tissues/Bones: No acute bone or soft tissue abnormality. No pulmonary embolus is identified. No acute cardiopulmonary disease. Postsurgical fibrosis in the right upper lung. PROCEDURES   Unless otherwise noted below, none     Procedures    CRITICAL CARE TIME   N/A    CONSULTS:  None      EMERGENCY DEPARTMENT COURSE and DIFFERENTIAL DIAGNOSIS/MDM:   Vitals:    Vitals:    07/27/18 1622 07/27/18 1624 07/27/18 1731 07/27/18 1848   BP: 133/89 120/83 136/85 127/89   Pulse: 80 90 69 70   Resp:   20 16   Temp:    98.9 °F (37.2 °C)   TempSrc:    Oral   SpO2:   97% 97%   Weight:       Height:           Patient was given the following medications:  Medications   hydrOXYzine (VISTARIL) capsule 25 mg (25 mg Oral Given 7/27/18 1627)   0.9 % sodium chloride bolus (0 mLs Intravenous Stopped 7/27/18 1848)   aspirin chewable tablet 324 mg (324 mg Oral Given 7/27/18 1626)   iopamidol (ISOVUE-370) 76 % injection 75 mL (75 mLs Intravenous Given 7/27/18 1641)     HEART SCORE:    History: +0 for low suspicion  EKG: +0 for normal EKG   Age: +1 for age 44-72 years  Risk factors (includes HLD, HTN, DM, tobacco use, obesity, and +FHx): +1 for 1-2 risk factors  Initial troponin: +0 for negative troponin    Heart score: 2. This falls under the following category: Score of 0-3, which indicates a very low risk for major adverse cardiac event and supports early discharge     The patient was also seen and evaluated by my attending, Dr. Leandro Mcbride. We discussed the history, physical, labs and imaging as well as the emergency department course. Please see their notes for further details. The patient was evaluated in the emergency department for Anxiety attack.   He states he had an argument with his son this morning and had a

## 2018-07-27 NOTE — ED NOTES
No report was given from previous Nurse on pt condition. I had no idea that pt had Troponin level due to be redrawn.      Amber Fountain, NAOMI  98/74/74 [id number]

## 2018-07-28 LAB
EKG ATRIAL RATE: 67 BPM
EKG DIAGNOSIS: NORMAL
EKG P AXIS: 14 DEGREES
EKG P-R INTERVAL: 150 MS
EKG Q-T INTERVAL: 390 MS
EKG QRS DURATION: 88 MS
EKG QTC CALCULATION (BAZETT): 412 MS
EKG R AXIS: -10 DEGREES
EKG T AXIS: 26 DEGREES
EKG VENTRICULAR RATE: 67 BPM

## 2018-07-28 PROCEDURE — 93010 ELECTROCARDIOGRAM REPORT: CPT | Performed by: INTERNAL MEDICINE

## 2018-07-28 NOTE — ED PROVIDER NOTES
Emergency Department Provider Note     Location: [facility]  ED  7/27/2018    I independently performed a history and physical on Simone Owens. All diagnostic, treatment, and disposition decisions were made by myself in conjunction with the mid-level provider. Briefly, this is a 60 y.o. male here for SOB, anxiety- thought it was panic attack about son being evicted, chest pain, lightheadedness, hx of DVT/PE in past, on coumadin, also c/o HA. Hx of frequent Has, thought they were migraines. Had some seasonal allergy symptoms after mowing grass yesterday. Hx of lung problems. ED Triage Vitals [07/27/18 1426]   BP Temp Temp Source Pulse Resp SpO2 Height Weight   (!) 151/95 98.7 °F (37.1 °C) Oral 81 18 98 % 6' 4\" (1.93 m) 228 lb (103.4 kg)        Exam showed no acute distress, but anxious appearing, A&Ox4, heart RRR, no M/G/R, lungs CTAB, resp. Nonlabored, no abd tenderness, rotund, no peritoneal signs/no rebound/no guarding, no calf tenderness, no significant LE edema/erythema/warmth  . EKG at 1456  interpreted by me shows:  normal sinus rhythm with a rate of 85  Axis is   Mild L axis dev. QTc is  within an acceptable range  Intervals and Durations are unremarkable. ST Segments: normal  No significant change from prior EKG dated 5-9-14  Rpt EKG w/ 3hr trop at 1842- no significant change, no ST segment/t-wave changes indicative of acute ischemia, R 67, NSR    ED physician CXR interpretation: Mediastinum is normal, no widening, No infiltrate, No effusion, No cardiomegaly and No pneumothorax, bony structures appear intact. L hemidiaphragm elev. Heart score 2, low risk as documented in PA chart. CTA neg for PE, chronic lung changes noted, therapeutic INR on coumadin, head CT performed per PA for frequent headaches, no prior imaging, it was negative as well, 2 neg trops, no ischemia on EKG, low risk of serious medical condition, likely anxiety related.  Will f/u w/ PCP, and cardiology, vistaril script for home to try, as it helped here. Will discuss his BP elev. W/ his doctor, but attributing to anxiety. For further details of Silvia Brody emergency department encounter, please see GEORGINA Billingsley's documentation. Clinical Impression:  1. Anxiety attack    2. Chest pain, unspecified type      Disposition:  Patient discharged home in stable condition. This chart was generated in part by using Dragon Dictation system and may contain errors related to that system including errors in grammar, punctuation, and spelling, as well as words and phrases that may be inappropriate. If there are any questions or concerns please feel free to contact the dictating provider for clarification.           Coolidge Bumpers,   08/11/18 [id number]

## 2018-08-17 ENCOUNTER — ANTI-COAG VISIT (OUTPATIENT)
Dept: PHARMACY | Age: 58
End: 2018-08-17
Payer: COMMERCIAL

## 2018-08-17 DIAGNOSIS — Z79.01 LONG TERM CURRENT USE OF ANTICOAGULANT: ICD-10-CM

## 2018-08-17 DIAGNOSIS — I26.99 OTHER PULMONARY EMBOLISM WITHOUT ACUTE COR PULMONALE, UNSPECIFIED CHRONICITY (HCC): ICD-10-CM

## 2018-08-17 LAB — INTERNATIONAL NORMALIZATION RATIO, POC: 2.9

## 2018-08-17 PROCEDURE — 85610 PROTHROMBIN TIME: CPT | Performed by: PHARMACIST

## 2018-08-17 PROCEDURE — 99211 OFF/OP EST MAY X REQ PHY/QHP: CPT | Performed by: PHARMACIST

## 2018-09-14 ENCOUNTER — ANTI-COAG VISIT (OUTPATIENT)
Dept: PHARMACY | Age: 58
End: 2018-09-14
Payer: COMMERCIAL

## 2018-09-14 DIAGNOSIS — I26.99 OTHER PULMONARY EMBOLISM WITHOUT ACUTE COR PULMONALE, UNSPECIFIED CHRONICITY (HCC): ICD-10-CM

## 2018-09-14 DIAGNOSIS — Z79.01 LONG TERM CURRENT USE OF ANTICOAGULANT: ICD-10-CM

## 2018-09-14 LAB — INR BLD: 3.4

## 2018-09-14 PROCEDURE — 99211 OFF/OP EST MAY X REQ PHY/QHP: CPT

## 2018-09-14 PROCEDURE — 85610 PROTHROMBIN TIME: CPT

## 2018-09-14 NOTE — PROGRESS NOTES
Mr. Allen Valladares has hx of multiple PE s/p 11/2009. 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 He presents today w/out complaint. New onset PE on 6/3/12. Pt. denies any s/s bleeding/bruising/ swelling/SOB. No BRBPR. No melena. No missed doses. No Rx/OTC/Herbal supplement changes. No EtOH changes; pt states that he does not drink alcohol. Pt stated ate more greens than usual this week. Pt sick a couple of days ago due to viral infection. INR 3.4 is slightly above therapeutic goal range of 2-3. Recommend to hold today's dose and continue 5 mg daily. Pt has 5 mg tablets. Will continue to monitor and check INR in 2 weeks. Dosing reminder card given with phone number, appointment date and time.    Return to clinic: 9/28/18 at [phone]  Referring Provider- Dr. Ayaan Ro, PharmD Candidate [id number]

## 2018-09-20 ENCOUNTER — OFFICE VISIT (OUTPATIENT)
Dept: DERMATOLOGY | Age: 58
End: 2018-09-20

## 2018-09-20 DIAGNOSIS — Z12.83 SCREENING EXAM FOR SKIN CANCER: ICD-10-CM

## 2018-09-20 DIAGNOSIS — L57.0 ACTINIC KERATOSES: ICD-10-CM

## 2018-09-20 DIAGNOSIS — D22.9 MULTIPLE BENIGN NEVI: Primary | ICD-10-CM

## 2018-09-20 PROCEDURE — 17003 DESTRUCT PREMALG LES 2-14: CPT | Performed by: DERMATOLOGY

## 2018-09-20 PROCEDURE — 17000 DESTRUCT PREMALG LESION: CPT | Performed by: DERMATOLOGY

## 2018-09-20 PROCEDURE — 99213 OFFICE O/P EST LOW 20 MIN: CPT | Performed by: DERMATOLOGY

## 2018-09-20 NOTE — PROGRESS NOTES
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 The following were examined and determined to be abnormal: Head/face, Breast/axilla/chest, Abdomen, Back, RUE, LUE, RLE and LLE. -General: Well-appearing, NAD  1. Scattered on the trunk and extremities are multiple well-defined round and oval symmetric smoothly-bordered uniformly brown macules and papules. 2. R 1 and L 2 temples, nasal bridge 1 - ill-defined irregularly-shaped roughly-scaling thin pink macules/papules     Assessment and Plan     1. Benign acquired melanocytic nevi  -Recommend monthly self skin exams   -Educated regarding the ABCDEs of melanoma detection   -Call for any new/changing moles or concerning lesions  -Reviewed sun protective behavior -- sun avoidance during the peak hours of the day, sun-protective clothing (including hat and sunglasses), sunscreen use (water resistant, broad spectrum, SPF at least 30, need for reapplication every 2 to 3 hours), avoidance of tanning beds   -Return for full skin exam in 1 year (sooner if indicated)     2.  Actinic keratosis(es)  -Edu re: relationship with chronic cumulative sun exposure, low premalignant potential.   -4 lesion(s) treated w/ liquid nitrogen x 2 cycles - R 1 and L 2 temples, nasal bridge 1. Edu re: risk of blister formation, discomfort, scar, hypopigmentation. Discussed wound care.

## 2018-09-28 ENCOUNTER — ANTI-COAG VISIT (OUTPATIENT)
Dept: PHARMACY | Age: 58
End: 2018-09-28
Payer: COMMERCIAL

## 2018-09-28 DIAGNOSIS — I26.99 OTHER PULMONARY EMBOLISM WITHOUT ACUTE COR PULMONALE, UNSPECIFIED CHRONICITY (HCC): ICD-10-CM

## 2018-09-28 DIAGNOSIS — Z79.01 LONG TERM CURRENT USE OF ANTICOAGULANT: ICD-10-CM

## 2018-09-28 LAB — INR BLD: 2.2

## 2018-09-28 PROCEDURE — 99211 OFF/OP EST MAY X REQ PHY/QHP: CPT

## 2018-09-28 PROCEDURE — 85610 PROTHROMBIN TIME: CPT

## 2018-10-01 ENCOUNTER — TELEPHONE (OUTPATIENT)
Dept: INTERNAL MEDICINE CLINIC | Age: 58
End: 2018-10-01

## 2018-10-01 DIAGNOSIS — Z79.01 LONG TERM CURRENT USE OF ANTICOAGULANT: ICD-10-CM

## 2018-10-01 RX ORDER — WARFARIN SODIUM 5 MG/1
5 TABLET ORAL DAILY
Qty: 90 TABLET | Refills: 3 | Status: SHIPPED | OUTPATIENT
Start: 2018-10-01 | End: 2019-09-26 | Stop reason: SDUPTHER

## 2018-10-26 ENCOUNTER — ANTI-COAG VISIT (OUTPATIENT)
Dept: PHARMACY | Age: 58
End: 2018-10-26
Payer: COMMERCIAL

## 2018-10-26 DIAGNOSIS — Z79.01 LONG TERM CURRENT USE OF ANTICOAGULANT: ICD-10-CM

## 2018-10-26 DIAGNOSIS — I26.99 OTHER PULMONARY EMBOLISM WITHOUT ACUTE COR PULMONALE, UNSPECIFIED CHRONICITY (HCC): ICD-10-CM

## 2018-10-26 LAB — INR BLD: 2.4

## 2018-10-26 PROCEDURE — 85610 PROTHROMBIN TIME: CPT

## 2018-10-26 PROCEDURE — 99211 OFF/OP EST MAY X REQ PHY/QHP: CPT

## 2018-10-26 NOTE — PROGRESS NOTES
Mr. Jaye Kawasaki has hx of multiple PE s/p 11/2009. 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 He presents today w/out complaint. New onset PE on 6/3/12. Pt. denies any s/s bleeding/bruising/ swelling/SOB. No BRBPR. No melena. No missed doses. No changes in Rx/OTC/herbal medications. No major changes in diet. Pt denies EtOH use. Patient will be getting a colonoscopy in January 2019. May need to be bridged. INR 2.4 is within the therapeutic goal range of 2-3. Recommend to continue 5 mg daily. Pt has 5 mg tablets. Will continue to monitor and check INR in 4 weeks. Dosing reminder card given with phone number, appointment date and time.    Return to clinic: 11/30 @ 10:00 AM  Referring provider: Dr. Jeremy Villela  PharmD Student [id number]  10/26/2018 1:55 PM

## 2018-11-30 ENCOUNTER — ANTI-COAG VISIT (OUTPATIENT)
Dept: PHARMACY | Age: 58
End: 2018-11-30
Payer: COMMERCIAL

## 2018-11-30 DIAGNOSIS — I26.99 OTHER PULMONARY EMBOLISM WITHOUT ACUTE COR PULMONALE, UNSPECIFIED CHRONICITY (HCC): ICD-10-CM

## 2018-11-30 DIAGNOSIS — Z79.01 LONG TERM CURRENT USE OF ANTICOAGULANT: ICD-10-CM

## 2018-11-30 LAB — INR BLD: 2.4

## 2018-11-30 PROCEDURE — 99211 OFF/OP EST MAY X REQ PHY/QHP: CPT | Performed by: FAMILY MEDICINE

## 2018-11-30 PROCEDURE — 85610 PROTHROMBIN TIME: CPT | Performed by: FAMILY MEDICINE

## 2018-12-28 ENCOUNTER — ANTI-COAG VISIT (OUTPATIENT)
Dept: PHARMACY | Age: 58
End: 2018-12-28
Payer: COMMERCIAL

## 2018-12-28 DIAGNOSIS — Z79.01 LONG TERM CURRENT USE OF ANTICOAGULANT: ICD-10-CM

## 2018-12-28 LAB — INR BLD: 3.1

## 2018-12-28 PROCEDURE — 85610 PROTHROMBIN TIME: CPT

## 2018-12-28 PROCEDURE — 99211 OFF/OP EST MAY X REQ PHY/QHP: CPT

## 2019-01-08 ENCOUNTER — OFFICE VISIT (OUTPATIENT)
Dept: INTERNAL MEDICINE CLINIC | Age: 59
End: 2019-01-08
Payer: COMMERCIAL

## 2019-01-08 VITALS
WEIGHT: 230 LBS | OXYGEN SATURATION: 98 % | HEART RATE: 75 BPM | DIASTOLIC BLOOD PRESSURE: 86 MMHG | HEIGHT: 76 IN | BODY MASS INDEX: 28.01 KG/M2 | SYSTOLIC BLOOD PRESSURE: 124 MMHG | TEMPERATURE: 98.6 F

## 2019-01-08 DIAGNOSIS — Z00.00 ROUTINE PHYSICAL EXAMINATION: Primary | ICD-10-CM

## 2019-01-08 DIAGNOSIS — Z86.718 PERSONAL HISTORY OF DVT (DEEP VEIN THROMBOSIS): ICD-10-CM

## 2019-01-08 DIAGNOSIS — E78.2 MIXED HYPERLIPIDEMIA: ICD-10-CM

## 2019-01-08 DIAGNOSIS — K21.9 GASTROESOPHAGEAL REFLUX DISEASE WITHOUT ESOPHAGITIS: ICD-10-CM

## 2019-01-08 DIAGNOSIS — Z12.11 SCREEN FOR COLON CANCER: ICD-10-CM

## 2019-01-08 DIAGNOSIS — Z79.01 LONG TERM CURRENT USE OF ANTICOAGULANT: ICD-10-CM

## 2019-01-08 DIAGNOSIS — Z12.5 SCREENING FOR PROSTATE CANCER: ICD-10-CM

## 2019-01-08 PROCEDURE — 99396 PREV VISIT EST AGE 40-64: CPT | Performed by: FAMILY MEDICINE

## 2019-01-08 RX ORDER — ATORVASTATIN CALCIUM 40 MG/1
TABLET, FILM COATED ORAL
Qty: 90 TABLET | Refills: 3 | Status: SHIPPED | OUTPATIENT
Start: 2019-01-08 | End: 2019-02-11 | Stop reason: SDUPTHER

## 2019-01-08 RX ORDER — ATORVASTATIN CALCIUM 40 MG/1
TABLET, FILM COATED ORAL
Qty: 90 TABLET | Refills: 3 | Status: SHIPPED | OUTPATIENT
Start: 2019-01-08 | End: 2019-01-08 | Stop reason: SDUPTHER

## 2019-01-08 ASSESSMENT — ENCOUNTER SYMPTOMS
COUGH: 0
NAUSEA: 0
VOMITING: 0
ABDOMINAL PAIN: 0
DIARRHEA: 0
RHINORRHEA: 0
TROUBLE SWALLOWING: 0
BACK PAIN: 0
CONSTIPATION: 0
SHORTNESS OF BREATH: 1
WHEEZING: 0
SORE THROAT: 0

## 2019-01-10 ENCOUNTER — TELEPHONE (OUTPATIENT)
Dept: INTERNAL MEDICINE CLINIC | Age: 59
End: 2019-01-10

## 2019-01-15 ENCOUNTER — HOSPITAL ENCOUNTER (OUTPATIENT)
Age: 59
Discharge: HOME | End: 2019-01-15
Payer: COMMERCIAL

## 2019-01-15 DIAGNOSIS — Z12.5 SCREENING FOR PROSTATE CANCER: ICD-10-CM

## 2019-01-15 DIAGNOSIS — Z00.00 ROUTINE PHYSICAL EXAMINATION: ICD-10-CM

## 2019-01-15 LAB
A/G RATIO: 1.4 (ref 1.1–2.2)
ALBUMIN SERPL-MCNC: 4.4 G/DL (ref 3.4–5)
ALP BLD-CCNC: 73 U/L (ref 40–129)
ALT SERPL-CCNC: 24 U/L (ref 10–40)
ANION GAP SERPL CALCULATED.3IONS-SCNC: 16 MMOL/L (ref 3–16)
AST SERPL-CCNC: 20 U/L (ref 15–37)
BASOPHILS ABSOLUTE: 0 K/UL (ref 0–0.2)
BASOPHILS RELATIVE PERCENT: 0.9 %
BILIRUB SERPL-MCNC: 0.6 MG/DL (ref 0–1)
BUN BLDV-MCNC: 9 MG/DL (ref 7–20)
CALCIUM SERPL-MCNC: 9.3 MG/DL (ref 8.3–10.6)
CHLORIDE BLD-SCNC: 106 MMOL/L (ref 99–110)
CHOLESTEROL, TOTAL: 147 MG/DL (ref 0–199)
CO2: 23 MMOL/L (ref 21–32)
CREAT SERPL-MCNC: 0.8 MG/DL (ref 0.9–1.3)
EOSINOPHILS ABSOLUTE: 0.1 K/UL (ref 0–0.6)
EOSINOPHILS RELATIVE PERCENT: 2.5 %
GFR AFRICAN AMERICAN: >60
GFR NON-AFRICAN AMERICAN: >60
GLOBULIN: 3.1 G/DL
GLUCOSE BLD-MCNC: 86 MG/DL (ref 70–99)
HCT VFR BLD CALC: 47.6 % (ref 40.5–52.5)
HDLC SERPL-MCNC: 38 MG/DL (ref 40–60)
HEMOGLOBIN: 16.2 G/DL (ref 13.5–17.5)
LDL CHOLESTEROL CALCULATED: 74 MG/DL
LYMPHOCYTES ABSOLUTE: 1.5 K/UL (ref 1–5.1)
LYMPHOCYTES RELATIVE PERCENT: 27.3 %
MCH RBC QN AUTO: 30.9 PG (ref 26–34)
MCHC RBC AUTO-ENTMCNC: 34 G/DL (ref 31–36)
MCV RBC AUTO: 91 FL (ref 80–100)
MONOCYTES ABSOLUTE: 0.4 K/UL (ref 0–1.3)
MONOCYTES RELATIVE PERCENT: 7.4 %
NEUTROPHILS ABSOLUTE: 3.5 K/UL (ref 1.7–7.7)
NEUTROPHILS RELATIVE PERCENT: 61.9 %
PDW BLD-RTO: 13.1 % (ref 12.4–15.4)
PLATELET # BLD: 242 K/UL (ref 135–450)
PMV BLD AUTO: 9.2 FL (ref 5–10.5)
POTASSIUM SERPL-SCNC: 4 MMOL/L (ref 3.5–5.1)
PROSTATE SPECIFIC ANTIGEN: 0.63 NG/ML (ref 0–4)
RBC # BLD: 5.23 M/UL (ref 4.2–5.9)
SODIUM BLD-SCNC: 145 MMOL/L (ref 136–145)
TOTAL PROTEIN: 7.5 G/DL (ref 6.4–8.2)
TRIGL SERPL-MCNC: 173 MG/DL (ref 0–150)
VLDLC SERPL CALC-MCNC: 35 MG/DL
WBC # BLD: 5.7 K/UL (ref 4–11)

## 2019-01-15 PROCEDURE — 83036 HEMOGLOBIN GLYCOSYLATED A1C: CPT

## 2019-01-15 PROCEDURE — 84153 ASSAY OF PSA TOTAL: CPT

## 2019-01-15 PROCEDURE — 80061 LIPID PANEL: CPT

## 2019-01-15 PROCEDURE — 85025 COMPLETE CBC W/AUTO DIFF WBC: CPT

## 2019-01-15 PROCEDURE — 36415 COLL VENOUS BLD VENIPUNCTURE: CPT

## 2019-01-15 PROCEDURE — 80053 COMPREHEN METABOLIC PANEL: CPT

## 2019-01-16 LAB
ESTIMATED AVERAGE GLUCOSE: 116.9 MG/DL
HBA1C MFR BLD: 5.7 %

## 2019-01-17 ENCOUNTER — TELEPHONE (OUTPATIENT)
Dept: INTERNAL MEDICINE CLINIC | Age: 59
End: 2019-01-17

## 2019-01-24 ENCOUNTER — HOSPITAL ENCOUNTER (OUTPATIENT)
Age: 59
Setting detail: OUTPATIENT SURGERY
Discharge: HOME | End: 2019-01-24
Attending: INTERNAL MEDICINE | Admitting: INTERNAL MEDICINE
Payer: COMMERCIAL

## 2019-01-24 VITALS
HEIGHT: 76 IN | SYSTOLIC BLOOD PRESSURE: 106 MMHG | DIASTOLIC BLOOD PRESSURE: 74 MMHG | HEART RATE: 75 BPM | BODY MASS INDEX: 26.79 KG/M2 | OXYGEN SATURATION: 93 % | TEMPERATURE: 97.8 F | WEIGHT: 220 LBS | RESPIRATION RATE: 16 BRPM

## 2019-01-24 PROCEDURE — 2580000003 HC RX 258: Performed by: INTERNAL MEDICINE

## 2019-01-24 PROCEDURE — 7100000010 HC PHASE II RECOVERY - FIRST 15 MIN: Performed by: INTERNAL MEDICINE

## 2019-01-24 PROCEDURE — 7100000011 HC PHASE II RECOVERY - ADDTL 15 MIN: Performed by: INTERNAL MEDICINE

## 2019-01-24 PROCEDURE — 99152 MOD SED SAME PHYS/QHP 5/>YRS: CPT | Performed by: INTERNAL MEDICINE

## 2019-01-24 PROCEDURE — 2709999900 HC NON-CHARGEABLE SUPPLY: Performed by: INTERNAL MEDICINE

## 2019-01-24 PROCEDURE — 3609027000 HC COLONOSCOPY: Performed by: INTERNAL MEDICINE

## 2019-01-24 PROCEDURE — 6360000002 HC RX W HCPCS: Performed by: INTERNAL MEDICINE

## 2019-01-24 RX ORDER — SODIUM CHLORIDE, SODIUM LACTATE, POTASSIUM CHLORIDE, CALCIUM CHLORIDE 600; 310; 30; 20 MG/100ML; MG/100ML; MG/100ML; MG/100ML
INJECTION, SOLUTION INTRAVENOUS ONCE
Status: COMPLETED | OUTPATIENT
Start: 2019-01-24 | End: 2019-01-24

## 2019-01-24 RX ORDER — FENTANYL CITRATE 50 UG/ML
INJECTION, SOLUTION INTRAMUSCULAR; INTRAVENOUS PRN
Status: DISCONTINUED | OUTPATIENT
Start: 2019-01-24 | End: 2019-01-24 | Stop reason: HOSPADM

## 2019-01-24 RX ORDER — LIDOCAINE HYDROCHLORIDE 10 MG/ML
0.1 INJECTION, SOLUTION EPIDURAL; INFILTRATION; INTRACAUDAL; PERINEURAL
Status: DISCONTINUED | OUTPATIENT
Start: 2019-01-24 | End: 2019-01-24 | Stop reason: HOSPADM

## 2019-01-24 RX ORDER — MIDAZOLAM HYDROCHLORIDE 5 MG/ML
INJECTION INTRAMUSCULAR; INTRAVENOUS PRN
Status: DISCONTINUED | OUTPATIENT
Start: 2019-01-24 | End: 2019-01-24 | Stop reason: HOSPADM

## 2019-01-24 RX ADMIN — SODIUM CHLORIDE, POTASSIUM CHLORIDE, SODIUM LACTATE AND CALCIUM CHLORIDE: 600; 310; 30; 20 INJECTION, SOLUTION INTRAVENOUS at 07:23

## 2019-01-24 ASSESSMENT — PAIN SCALES - GENERAL
PAINLEVEL_OUTOF10: 0

## 2019-01-24 ASSESSMENT — PAIN - FUNCTIONAL ASSESSMENT: PAIN_FUNCTIONAL_ASSESSMENT: 0-10

## 2019-01-29 ENCOUNTER — ANTI-COAG VISIT (OUTPATIENT)
Dept: PHARMACY | Age: 59
End: 2019-01-29
Payer: COMMERCIAL

## 2019-01-29 LAB — INTERNATIONAL NORMALIZATION RATIO, POC: 1.4

## 2019-01-29 PROCEDURE — 99211 OFF/OP EST MAY X REQ PHY/QHP: CPT

## 2019-01-29 PROCEDURE — 85610 PROTHROMBIN TIME: CPT

## 2019-02-09 DIAGNOSIS — E78.2 MIXED HYPERLIPIDEMIA: ICD-10-CM

## 2019-02-11 RX ORDER — ATORVASTATIN CALCIUM 40 MG/1
TABLET, FILM COATED ORAL
Qty: 90 TABLET | Refills: 3 | Status: SHIPPED | OUTPATIENT
Start: 2019-02-11 | End: 2020-02-24

## 2019-02-12 ENCOUNTER — ANTI-COAG VISIT (OUTPATIENT)
Dept: PHARMACY | Age: 59
End: 2019-02-12
Payer: COMMERCIAL

## 2019-02-12 DIAGNOSIS — Z79.01 LONG TERM CURRENT USE OF ANTICOAGULANT: ICD-10-CM

## 2019-02-12 LAB — INR BLD: 2.3

## 2019-02-12 PROCEDURE — 99211 OFF/OP EST MAY X REQ PHY/QHP: CPT

## 2019-02-12 PROCEDURE — 85610 PROTHROMBIN TIME: CPT

## 2019-03-04 LAB
CHOLESTEROL, TOTAL: 154 MG/DL (ref 0–199)
GLUCOSE BLD-MCNC: 99 MG/DL (ref 70–99)
HDLC SERPL-MCNC: 39 MG/DL (ref 40–60)
LDL CHOLESTEROL CALCULATED: 86 MG/DL
TRIGL SERPL-MCNC: 146 MG/DL (ref 0–150)

## 2019-03-12 ENCOUNTER — ANTI-COAG VISIT (OUTPATIENT)
Dept: PHARMACY | Age: 59
End: 2019-03-12
Payer: COMMERCIAL

## 2019-03-12 LAB — INTERNATIONAL NORMALIZATION RATIO, POC: 2.5

## 2019-03-12 PROCEDURE — 99211 OFF/OP EST MAY X REQ PHY/QHP: CPT

## 2019-03-12 PROCEDURE — 85610 PROTHROMBIN TIME: CPT

## 2019-04-09 ENCOUNTER — ANTI-COAG VISIT (OUTPATIENT)
Dept: PHARMACY | Age: 59
End: 2019-04-09
Payer: COMMERCIAL

## 2019-04-09 DIAGNOSIS — Z79.01 LONG TERM CURRENT USE OF ANTICOAGULANT: ICD-10-CM

## 2019-04-09 LAB — INR BLD: 2.5

## 2019-04-09 PROCEDURE — 99211 OFF/OP EST MAY X REQ PHY/QHP: CPT | Performed by: FAMILY MEDICINE

## 2019-04-09 PROCEDURE — 85610 PROTHROMBIN TIME: CPT | Performed by: FAMILY MEDICINE

## 2019-04-09 NOTE — PROGRESS NOTES
Mr. Liya Slater has hx of multiple PE s/p 11/2009. 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 He presents today w/out complaint. New onset PE on 6/3/12. Pt. denies any s/s bleeding/bruising/ swelling/SOB. No BRBPR. No melena. No missed doses. No changes in Rx/OTC/herbal medications. No major changes in diet. Pt denies EtOH use. INR 2.5 is within acceptable therapeutic goal range of 2-3. Recommend to continue 5 mg daily. Pt has 5 mg tablets. Will continue to monitor and check INR in 4 weeks. Dosing reminder card given with phone number, appointment date and time.    Return to clinic: 5/7 @ 1 pm   Referring provider: Dr. Judy Atkins, PharmD 4/9/2019 2:44 PM

## 2019-05-07 ENCOUNTER — ANTI-COAG VISIT (OUTPATIENT)
Dept: PHARMACY | Age: 59
End: 2019-05-07
Payer: COMMERCIAL

## 2019-05-07 LAB — INTERNATIONAL NORMALIZATION RATIO, POC: 2.5

## 2019-05-07 PROCEDURE — 85610 PROTHROMBIN TIME: CPT

## 2019-05-07 PROCEDURE — 99211 OFF/OP EST MAY X REQ PHY/QHP: CPT

## 2019-05-07 NOTE — PROGRESS NOTES
Mr. Bhavin Lawson has hx of multiple PE s/p 11/2009. 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 He presents today w/out complaint. New onset PE on 6/3/12. Pt. denies any s/s bleeding/bruising/ swelling/SOB. No BRBPR. No melena. No missed doses. No changes in Rx/OTC/herbal medications. No major changes in diet. Pt denies EtOH use. INR 2.5 is within acceptable therapeutic goal range of 2-3. Recommend to continue 5 mg daily. Pt has 5 mg tablets. Will continue to monitor and check INR in 4 weeks. Dosing reminder card given with phone number, appointment date and time.    Return to clinic: 6/4 @ 115 pm   Referring provider: Dr. Leidy Melgar PharmD  5/7/2019 at 12:51 PM

## 2019-06-04 ENCOUNTER — ANTI-COAG VISIT (OUTPATIENT)
Dept: PHARMACY | Age: 59
End: 2019-06-04
Payer: COMMERCIAL

## 2019-06-04 DIAGNOSIS — Z79.01 LONG TERM CURRENT USE OF ANTICOAGULANT: ICD-10-CM

## 2019-06-04 LAB — INR BLD: 3.3

## 2019-06-04 PROCEDURE — 85610 PROTHROMBIN TIME: CPT

## 2019-06-04 PROCEDURE — 99211 OFF/OP EST MAY X REQ PHY/QHP: CPT

## 2019-06-04 NOTE — PROGRESS NOTES
Mr. Danielle Rodríguez has hx of multiple PE s/p 11/2009. 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 He presents today w/out complaint. New onset PE on 6/3/12. Pt. denies any s/s bleeding/bruising/ swelling/SOB. No BRBPR. No melena. No missed doses. No changes in Rx/OTC/herbal medications. No major changes in diet. Pt denies EtOH use. INR 3.3 is slightly above acceptable therapeutic goal range of 2-3. Recommend to hold today then continue 5 mg daily. Pt has 5 mg tablets. Will continue to monitor and check INR in 4 weeks. Dosing reminder card given with phone number, appointment date and time.    Return to clinic: 7/2 @ 1:30 pm    Referring provider: Dr. Marizol Coreas

## 2019-07-02 ENCOUNTER — ANTI-COAG VISIT (OUTPATIENT)
Dept: PHARMACY | Age: 59
End: 2019-07-02
Payer: COMMERCIAL

## 2019-07-02 DIAGNOSIS — Z79.01 LONG TERM CURRENT USE OF ANTICOAGULANT: ICD-10-CM

## 2019-07-02 LAB — INR BLD: 3.4

## 2019-07-02 PROCEDURE — 99211 OFF/OP EST MAY X REQ PHY/QHP: CPT

## 2019-07-02 PROCEDURE — 85610 PROTHROMBIN TIME: CPT

## 2019-07-16 ENCOUNTER — ANTI-COAG VISIT (OUTPATIENT)
Dept: PHARMACY | Age: 59
End: 2019-07-16
Payer: COMMERCIAL

## 2019-07-16 DIAGNOSIS — I27.82 CHRONIC PULMONARY EMBOLISM WITHOUT ACUTE COR PULMONALE, UNSPECIFIED PULMONARY EMBOLISM TYPE (HCC): ICD-10-CM

## 2019-07-16 DIAGNOSIS — Z79.01 LONG TERM CURRENT USE OF ANTICOAGULANT: ICD-10-CM

## 2019-07-16 LAB — INR BLD: 2.7

## 2019-07-16 PROCEDURE — 85610 PROTHROMBIN TIME: CPT | Performed by: INTERNAL MEDICINE

## 2019-07-16 PROCEDURE — 99211 OFF/OP EST MAY X REQ PHY/QHP: CPT | Performed by: INTERNAL MEDICINE

## 2019-07-16 NOTE — PROGRESS NOTES
Mr. Gwen Pearson has hx of multiple PE s/p 11/2009. 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 He presents today w/out complaint. New onset PE on 6/3/12. Pt. denies any s/s bleeding/bruising/ swelling/SOB. No BRBPR. No melena. No missed doses. No changes in Rx/OTC/herbal medications. No major changes in diet - still eating salads and coleslaw. Pt denies EtOH use. INR 2.7 is within acceptable therapeutic goal range of 2-3. Pt maintenance dose is slightly different than recommendation from last visit; is taking 5mg daily, which is the dose he has been on for a long time. Recommend to continue maintenance of 5mg daily. Pt has 5 mg tablets. Will continue to monitor and check INR in 4 weeks. Dosing reminder card given with phone number, appointment date and time. Return to clinic: 8/13 @ 11:30 am    Referring provider: Dr. Zan Vences, PharmD candidate  07/16/19 11:36 AM    I have seen the patient and reviewed the progress note written by the PharmD Candidate. I agree with this assessment and plan.    Abner Jenkins PharmD 7/16/2019 1:26 PM

## 2019-08-13 ENCOUNTER — ANTI-COAG VISIT (OUTPATIENT)
Dept: PHARMACY | Age: 59
End: 2019-08-13
Payer: COMMERCIAL

## 2019-08-13 DIAGNOSIS — I27.82 CHRONIC PULMONARY EMBOLISM WITHOUT ACUTE COR PULMONALE, UNSPECIFIED PULMONARY EMBOLISM TYPE (HCC): ICD-10-CM

## 2019-08-13 DIAGNOSIS — Z79.01 LONG TERM CURRENT USE OF ANTICOAGULANT: ICD-10-CM

## 2019-08-13 LAB — INR BLD: 2.9

## 2019-08-13 PROCEDURE — 99213 OFFICE O/P EST LOW 20 MIN: CPT | Performed by: PHARMACIST

## 2019-08-13 PROCEDURE — 85610 PROTHROMBIN TIME: CPT | Performed by: PHARMACIST

## 2019-09-10 ENCOUNTER — ANTI-COAG VISIT (OUTPATIENT)
Dept: PHARMACY | Age: 59
End: 2019-09-10
Payer: COMMERCIAL

## 2019-09-10 DIAGNOSIS — I27.82 CHRONIC PULMONARY EMBOLISM WITHOUT ACUTE COR PULMONALE, UNSPECIFIED PULMONARY EMBOLISM TYPE (HCC): ICD-10-CM

## 2019-09-10 DIAGNOSIS — Z79.01 LONG TERM CURRENT USE OF ANTICOAGULANT: ICD-10-CM

## 2019-09-10 LAB — INR BLD: 2.3

## 2019-09-10 PROCEDURE — 99211 OFF/OP EST MAY X REQ PHY/QHP: CPT

## 2019-09-10 PROCEDURE — 85610 PROTHROMBIN TIME: CPT

## 2019-09-24 NOTE — PROGRESS NOTES
year (sooner if indicated)     2. Actinic keratosis(es)  -Edu re: relationship with chronic cumulative sun exposure, low premalignant potential.   -10 lesion(s) treated w/ liquid nitrogen x 2 cycles - R 3 and L 4 temples, nasal bridge 1, R 1 and L 1 nasal sidewall. Edu re: risk of blister formation, discomfort, scar, hypopigmentation. Discussed wound care. 3. Seborrheic keratosis(es)  -Reassurance re: benignity  -No treatment performed.

## 2019-09-26 ENCOUNTER — OFFICE VISIT (OUTPATIENT)
Dept: DERMATOLOGY | Age: 59
End: 2019-09-26
Payer: COMMERCIAL

## 2019-09-26 DIAGNOSIS — D22.9 MULTIPLE BENIGN NEVI: Primary | ICD-10-CM

## 2019-09-26 DIAGNOSIS — L57.0 ACTINIC KERATOSES: ICD-10-CM

## 2019-09-26 DIAGNOSIS — Z12.83 SCREENING EXAM FOR SKIN CANCER: ICD-10-CM

## 2019-09-26 DIAGNOSIS — L82.1 SEBORRHEIC KERATOSES: ICD-10-CM

## 2019-09-26 PROCEDURE — 17000 DESTRUCT PREMALG LESION: CPT | Performed by: DERMATOLOGY

## 2019-09-26 PROCEDURE — 17003 DESTRUCT PREMALG LES 2-14: CPT | Performed by: DERMATOLOGY

## 2019-09-26 PROCEDURE — 99213 OFFICE O/P EST LOW 20 MIN: CPT | Performed by: DERMATOLOGY

## 2019-10-08 ENCOUNTER — ANTI-COAG VISIT (OUTPATIENT)
Dept: PHARMACY | Age: 59
End: 2019-10-08
Payer: COMMERCIAL

## 2019-10-08 DIAGNOSIS — Z79.01 LONG TERM CURRENT USE OF ANTICOAGULANT: ICD-10-CM

## 2019-10-08 LAB — INR BLD: 1.8

## 2019-10-08 PROCEDURE — 99211 OFF/OP EST MAY X REQ PHY/QHP: CPT

## 2019-10-08 PROCEDURE — 85610 PROTHROMBIN TIME: CPT

## 2019-10-12 ENCOUNTER — TELEPHONE (OUTPATIENT)
Dept: OTHER | Facility: CLINIC | Age: 59
End: 2019-10-12

## 2019-10-12 ENCOUNTER — NURSE TRIAGE (OUTPATIENT)
Dept: OTHER | Facility: CLINIC | Age: 59
End: 2019-10-12

## 2019-10-21 ENCOUNTER — TELEPHONE (OUTPATIENT)
Dept: INTERNAL MEDICINE CLINIC | Age: 59
End: 2019-10-21

## 2019-11-05 ENCOUNTER — ANTI-COAG VISIT (OUTPATIENT)
Dept: PHARMACY | Age: 59
End: 2019-11-05
Payer: COMMERCIAL

## 2019-11-05 DIAGNOSIS — I26.99 PULMONARY EMBOLISM, OTHER, UNSPECIFIED CHRONICITY, UNSPECIFIED WHETHER ACUTE COR PULMONALE PRESENT (HCC): ICD-10-CM

## 2019-11-05 DIAGNOSIS — Z79.01 LONG TERM CURRENT USE OF ANTICOAGULANT: ICD-10-CM

## 2019-11-05 LAB — INR BLD: 2.9

## 2019-11-05 PROCEDURE — 99211 OFF/OP EST MAY X REQ PHY/QHP: CPT | Performed by: INTERNAL MEDICINE

## 2019-11-05 PROCEDURE — 85610 PROTHROMBIN TIME: CPT | Performed by: INTERNAL MEDICINE

## 2019-12-03 ENCOUNTER — ANTI-COAG VISIT (OUTPATIENT)
Dept: PHARMACY | Age: 59
End: 2019-12-03
Payer: COMMERCIAL

## 2019-12-03 DIAGNOSIS — Z79.01 LONG TERM CURRENT USE OF ANTICOAGULANT: ICD-10-CM

## 2019-12-03 DIAGNOSIS — I26.99 PULMONARY EMBOLISM, OTHER, UNSPECIFIED CHRONICITY, UNSPECIFIED WHETHER ACUTE COR PULMONALE PRESENT (HCC): ICD-10-CM

## 2019-12-03 LAB — INR BLD: 2.6

## 2019-12-03 PROCEDURE — 99211 OFF/OP EST MAY X REQ PHY/QHP: CPT | Performed by: INTERNAL MEDICINE

## 2019-12-03 PROCEDURE — 85610 PROTHROMBIN TIME: CPT | Performed by: INTERNAL MEDICINE

## 2020-01-07 ENCOUNTER — ANTI-COAG VISIT (OUTPATIENT)
Dept: PHARMACY | Age: 60
End: 2020-01-07
Payer: COMMERCIAL

## 2020-01-07 LAB — INTERNATIONAL NORMALIZATION RATIO, POC: 1.4

## 2020-01-07 PROCEDURE — 85610 PROTHROMBIN TIME: CPT

## 2020-01-07 PROCEDURE — 99211 OFF/OP EST MAY X REQ PHY/QHP: CPT

## 2020-01-08 ENCOUNTER — OFFICE VISIT (OUTPATIENT)
Dept: INTERNAL MEDICINE CLINIC | Age: 60
End: 2020-01-08
Payer: COMMERCIAL

## 2020-01-08 VITALS
TEMPERATURE: 98.6 F | BODY MASS INDEX: 28.49 KG/M2 | HEIGHT: 76 IN | HEART RATE: 96 BPM | OXYGEN SATURATION: 96 % | DIASTOLIC BLOOD PRESSURE: 72 MMHG | WEIGHT: 234 LBS | SYSTOLIC BLOOD PRESSURE: 112 MMHG

## 2020-01-08 PROCEDURE — 99396 PREV VISIT EST AGE 40-64: CPT | Performed by: NURSE PRACTITIONER

## 2020-01-08 ASSESSMENT — ENCOUNTER SYMPTOMS
COUGH: 0
SORE THROAT: 0
VOMITING: 0
SINUS PAIN: 0
NAUSEA: 0
DIARRHEA: 0
SHORTNESS OF BREATH: 0
CHEST TIGHTNESS: 0
CONSTIPATION: 0

## 2020-01-08 ASSESSMENT — PATIENT HEALTH QUESTIONNAIRE - PHQ9
SUM OF ALL RESPONSES TO PHQ QUESTIONS 1-9: 2
SUM OF ALL RESPONSES TO PHQ QUESTIONS 1-9: 2
1. LITTLE INTEREST OR PLEASURE IN DOING THINGS: 0
2. FEELING DOWN, DEPRESSED OR HOPELESS: 2
SUM OF ALL RESPONSES TO PHQ9 QUESTIONS 1 & 2: 2

## 2020-01-08 NOTE — PROGRESS NOTES
[address]  [address] [facility]  [address] [facility]  Acosta Khoury is a 60 y.o. male who presents today for hismedical conditions/complaints as noted below. Humaira Dey is c/o of Annual Exam    Chief Complaint   Patient presents with    Annual Exam     HPI:     Mr. Darin Amezcua presents for his annual wellness exam. Overall he states he is doing well. Denies current concerns of note. Has been stable with coumadin for hx of PE. No recurrent symptoms/episodes. Denies issues with lipitor. States he is active throughout the day and tries to watch his diet to the best of his ability. Entire medical history, surgical history, family history, allergies, social history, and health maintenance items reviewed and updated as captured in the relevant section of patient's chart. Subjective:     Review of Systems   Constitutional: Negative for fever. HENT: Negative for sinus pain and sore throat. Respiratory: Negative for cough, chest tightness and shortness of breath. Cardiovascular: Negative for chest pain and palpitations. Gastrointestinal: Negative for constipation, diarrhea, nausea and vomiting. Genitourinary: Negative for dysuria and urgency. Skin: Negative for rash. Neurological: Negative for dizziness and weakness. Objective:     Vitals:    01/08/20 0940   BP: 112/72   Site: Left Upper Arm   Position: Sitting   Cuff Size: Large Adult   Pulse: 96   Temp: 98.6 °F (37 °C)   SpO2: 96%   Weight: 234 lb (106.1 kg)   Height: 6' 4\" (1.93 m)     Physical Exam  Constitutional:       Appearance: Normal appearance. He is well-developed. HENT:      Head: Normocephalic. Right Ear: Hearing and external ear normal.      Left Ear: Hearing and external ear normal.      Nose: Nose normal.   Eyes:      General: Lids are normal. Lids are everted, no foreign bodies appreciated.       Conjunctiva/sclera: Conjunctivae normal. habits at length (regular exercise, healthy diet, no smoking)  -gave orders for fasting bloodwork to obtain in the next 1-2 months     Elevated lipoprotein(a)  -     LIPID PANEL; Future    History of pulmonary embolus (PE)    Continue warfarin. Dose adjusted by coumadin clinic. Return in about 1 year (around 1/8/2021), or if symptoms worsen or fail to improve. Patient should call the office immediately with new or ongoing signs or symptoms or worsening, or proceed to the emergency room. If you are onmedications which could impair your senses, you are at risk of weakness, falls, dizziness, or drowsiness. You should be careful during activities which could place you at risk of harm, such as climbing, using stairs,operating machinery, or driving vehicles. If you feel you cannot safely do these activities, you should request others to help you, or avoid the activities altogether. If you are drowsy for any other reason, you should usethe same precautions as listed above. Call if pattern of symptoms change or persists for an extended time.     SHERITA HoffC

## 2020-01-13 RX ORDER — WARFARIN SODIUM 5 MG/1
TABLET ORAL
Qty: 90 TABLET | Refills: 0 | Status: ON HOLD
Start: 2020-01-13 | End: 2020-06-20 | Stop reason: HOSPADM

## 2020-01-13 NOTE — TELEPHONE ENCOUNTER
Refill request for warfarin medication.      Name of [place]    Last visit - 1/8/2020     Pending visit - 1/11/2021    Last refill -9/26/19  0 refills

## 2020-02-04 ENCOUNTER — ANTI-COAG VISIT (OUTPATIENT)
Dept: PHARMACY | Age: 60
End: 2020-02-04
Payer: COMMERCIAL

## 2020-02-04 LAB — INTERNATIONAL NORMALIZATION RATIO, POC: 2.7

## 2020-02-04 PROCEDURE — 85610 PROTHROMBIN TIME: CPT

## 2020-02-04 PROCEDURE — 99211 OFF/OP EST MAY X REQ PHY/QHP: CPT

## 2020-02-24 RX ORDER — ATORVASTATIN CALCIUM 40 MG/1
TABLET, FILM COATED ORAL
Qty: 90 TABLET | Refills: 3 | Status: SHIPPED | OUTPATIENT
Start: 2020-02-24 | End: 2021-02-10

## 2020-03-03 ENCOUNTER — ANTI-COAG VISIT (OUTPATIENT)
Dept: PHARMACY | Age: 60
End: 2020-03-03
Payer: COMMERCIAL

## 2020-03-03 LAB — INR BLD: 1.7

## 2020-03-03 PROCEDURE — 85610 PROTHROMBIN TIME: CPT

## 2020-03-03 PROCEDURE — 99211 OFF/OP EST MAY X REQ PHY/QHP: CPT

## 2020-03-03 NOTE — PROGRESS NOTES
Mr. Fuad Degroot has hx of multiple PE s/p 11/2009 and 6/2012. 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 He presents today w/out complaint. Pt. denies any s/sx bleeding/bruising/ swelling/SOB. No missed doses. No changes in Rx/OTC/herbal medications. No major changes in diet, back to normal amount of greens. Pt denies EtOH use. INR 1.7 is below acceptable therapeutic goal range of 2-3, d/t missed dose on Sat. Recommend to take 7.5mg today only then continue Warfarin 5mg daily. Pt has 5 mg tablets. Will continue to monitor and check INR in 4 weeks. Dosing reminder card given with phone number, appointment date and time.    Return to clinic: 4/2 @ 1000  Referring provider: Dr. Yossi Archer

## 2020-05-07 ENCOUNTER — ANTI-COAG VISIT (OUTPATIENT)
Dept: PHARMACY | Age: 60
End: 2020-05-07
Payer: COMMERCIAL

## 2020-05-07 LAB — INR BLD: 2.8

## 2020-05-07 PROCEDURE — 85610 PROTHROMBIN TIME: CPT

## 2020-05-07 PROCEDURE — 99211 OFF/OP EST MAY X REQ PHY/QHP: CPT

## 2020-05-07 NOTE — PROGRESS NOTES
Mr. Peter Turner has hx of multiple PE s/p 11/2009 and 6/2012. 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 He presents today w/out complaint. Pt. denies any s/sx bleeding/bruising/ swelling/SOB. No missed doses. No changes in Rx/OTC/herbal medications. No major changes in diet, back to normal amount of greens. Pt denies EtOH use. INR 2.8 is within acceptable therapeutic goal range of 2-3. Recommend to continue Warfarin 5mg daily. Pt has 5 mg tablets. Will continue to monitor and check INR in 6 weeks. Dosing reminder card given with phone number, appointment date and time.    Return to clinic: 6/18 @ 1400  Referring provider: Dr. Candido Perry, PharmD 5/7/20  2:08 PM     CLINICAL PHARMACY CONSULT: MED RECONCILIATION/REVIEW ADDENDUM    For Pharmacy Admin Tracking Only    PHSO: Yes  Total # of Interventions Recommended: 0  - Maintenance Safety Lab Monitoring #: 1  Total Interventions Accepted: 0  Time Spent (min): 15

## 2020-06-17 ENCOUNTER — HOSPITAL ENCOUNTER (INPATIENT)
Age: 60
LOS: 3 days | Discharge: HOME | DRG: 086 | End: 2020-06-20
Attending: INTERNAL MEDICINE | Admitting: INTERNAL MEDICINE
Payer: COMMERCIAL

## 2020-06-17 ENCOUNTER — APPOINTMENT (OUTPATIENT)
Dept: CT IMAGING | Age: 60
End: 2020-06-17
Payer: COMMERCIAL

## 2020-06-17 ENCOUNTER — HOSPITAL ENCOUNTER (EMERGENCY)
Age: 60
Discharge: ANOTHER HOSPITAL | End: 2020-06-17
Attending: EMERGENCY MEDICINE
Payer: COMMERCIAL

## 2020-06-17 ENCOUNTER — NURSE TRIAGE (OUTPATIENT)
Dept: OTHER | Facility: CLINIC | Age: 60
End: 2020-06-17

## 2020-06-17 VITALS
DIASTOLIC BLOOD PRESSURE: 84 MMHG | HEIGHT: 77 IN | BODY MASS INDEX: 25.98 KG/M2 | HEART RATE: 88 BPM | OXYGEN SATURATION: 97 % | RESPIRATION RATE: 16 BRPM | TEMPERATURE: 98.1 F | SYSTOLIC BLOOD PRESSURE: 147 MMHG | WEIGHT: 220 LBS

## 2020-06-17 PROBLEM — S06.5XAA SUBDURAL HEMATOMA (HCC): Status: ACTIVE | Noted: 2020-06-17

## 2020-06-17 LAB
A/G RATIO: 1.4 (ref 1.1–2.2)
ALBUMIN SERPL-MCNC: 4.5 G/DL (ref 3.4–5)
ALP BLD-CCNC: 72 U/L (ref 40–129)
ALT SERPL-CCNC: 30 U/L (ref 10–40)
ANION GAP SERPL CALCULATED.3IONS-SCNC: 12 MMOL/L (ref 3–16)
AST SERPL-CCNC: 23 U/L (ref 15–37)
BASOPHILS ABSOLUTE: 0 K/UL (ref 0–0.2)
BASOPHILS RELATIVE PERCENT: 0.2 %
BILIRUB SERPL-MCNC: 0.7 MG/DL (ref 0–1)
BUN BLDV-MCNC: 9 MG/DL (ref 7–20)
CALCIUM SERPL-MCNC: 9.4 MG/DL (ref 8.3–10.6)
CHLORIDE BLD-SCNC: 102 MMOL/L (ref 99–110)
CO2: 24 MMOL/L (ref 21–32)
CREAT SERPL-MCNC: 0.7 MG/DL (ref 0.8–1.3)
EOSINOPHILS ABSOLUTE: 0 K/UL (ref 0–0.6)
EOSINOPHILS RELATIVE PERCENT: 0.7 %
GFR AFRICAN AMERICAN: >60
GFR NON-AFRICAN AMERICAN: >60
GLOBULIN: 3.2 G/DL
GLUCOSE BLD-MCNC: 138 MG/DL (ref 70–99)
HCT VFR BLD CALC: 48.5 % (ref 40.5–52.5)
HEMOGLOBIN: 16.7 G/DL (ref 13.5–17.5)
INR BLD: 1.13 (ref 0.86–1.14)
INR BLD: 3.01 (ref 0.86–1.14)
LYMPHOCYTES ABSOLUTE: 1.2 K/UL (ref 1–5.1)
LYMPHOCYTES RELATIVE PERCENT: 18 %
MCH RBC QN AUTO: 31.8 PG (ref 26–34)
MCHC RBC AUTO-ENTMCNC: 34.4 G/DL (ref 31–36)
MCV RBC AUTO: 92.6 FL (ref 80–100)
MONOCYTES ABSOLUTE: 0.4 K/UL (ref 0–1.3)
MONOCYTES RELATIVE PERCENT: 6.2 %
NEUTROPHILS ABSOLUTE: 4.9 K/UL (ref 1.7–7.7)
NEUTROPHILS RELATIVE PERCENT: 74.9 %
PDW BLD-RTO: 13.1 % (ref 12.4–15.4)
PLATELET # BLD: 262 K/UL (ref 135–450)
PMV BLD AUTO: 8.2 FL (ref 5–10.5)
POTASSIUM SERPL-SCNC: 3.7 MMOL/L (ref 3.5–5.1)
PROTHROMBIN TIME: 13.1 SEC (ref 10–13.2)
PROTHROMBIN TIME: 35.3 SEC (ref 10–13.2)
RBC # BLD: 5.24 M/UL (ref 4.2–5.9)
SODIUM BLD-SCNC: 138 MMOL/L (ref 136–145)
TOTAL PROTEIN: 7.7 G/DL (ref 6.4–8.2)
WBC # BLD: 6.6 K/UL (ref 4–11)

## 2020-06-17 PROCEDURE — 85025 COMPLETE CBC W/AUTO DIFF WBC: CPT

## 2020-06-17 PROCEDURE — U0003 INFECTIOUS AGENT DETECTION BY NUCLEIC ACID (DNA OR RNA); SEVERE ACUTE RESPIRATORY SYNDROME CORONAVIRUS 2 (SARS-COV-2) (CORONAVIRUS DISEASE [COVID-19]), AMPLIFIED PROBE TECHNIQUE, MAKING USE OF HIGH THROUGHPUT TECHNOLOGIES AS DESCRIBED BY CMS-2020-01-R: HCPCS

## 2020-06-17 PROCEDURE — 96375 TX/PRO/DX INJ NEW DRUG ADDON: CPT

## 2020-06-17 PROCEDURE — 2580000003 HC RX 258: Performed by: NURSE PRACTITIONER

## 2020-06-17 PROCEDURE — 6370000000 HC RX 637 (ALT 250 FOR IP): Performed by: INTERNAL MEDICINE

## 2020-06-17 PROCEDURE — 99284 EMERGENCY DEPT VISIT MOD MDM: CPT

## 2020-06-17 PROCEDURE — 96365 THER/PROPH/DIAG IV INF INIT: CPT

## 2020-06-17 PROCEDURE — 96376 TX/PRO/DX INJ SAME DRUG ADON: CPT

## 2020-06-17 PROCEDURE — 1200000000 HC SEMI PRIVATE

## 2020-06-17 PROCEDURE — 70450 CT HEAD/BRAIN W/O DYE: CPT

## 2020-06-17 PROCEDURE — C9132 KCENTRA, PER I.U.: HCPCS | Performed by: NURSE PRACTITIONER

## 2020-06-17 PROCEDURE — 2580000003 HC RX 258: Performed by: INTERNAL MEDICINE

## 2020-06-17 PROCEDURE — 6360000002 HC RX W HCPCS: Performed by: NURSE PRACTITIONER

## 2020-06-17 PROCEDURE — 85610 PROTHROMBIN TIME: CPT

## 2020-06-17 PROCEDURE — 80053 COMPREHEN METABOLIC PANEL: CPT

## 2020-06-17 RX ORDER — ONDANSETRON 2 MG/ML
4 INJECTION INTRAMUSCULAR; INTRAVENOUS EVERY 6 HOURS PRN
Status: DISCONTINUED | OUTPATIENT
Start: 2020-06-17 | End: 2020-06-20 | Stop reason: HOSPADM

## 2020-06-17 RX ORDER — ACETAMINOPHEN 650 MG/1
650 SUPPOSITORY RECTAL EVERY 6 HOURS PRN
Status: DISCONTINUED | OUTPATIENT
Start: 2020-06-17 | End: 2020-06-20 | Stop reason: HOSPADM

## 2020-06-17 RX ORDER — PROMETHAZINE HYDROCHLORIDE 25 MG/1
12.5 TABLET ORAL EVERY 6 HOURS PRN
Status: DISCONTINUED | OUTPATIENT
Start: 2020-06-17 | End: 2020-06-20 | Stop reason: HOSPADM

## 2020-06-17 RX ORDER — ACETAMINOPHEN 325 MG/1
650 TABLET ORAL EVERY 6 HOURS PRN
Status: DISCONTINUED | OUTPATIENT
Start: 2020-06-17 | End: 2020-06-20 | Stop reason: HOSPADM

## 2020-06-17 RX ORDER — PANTOPRAZOLE SODIUM 40 MG/1
40 TABLET, DELAYED RELEASE ORAL
Status: DISCONTINUED | OUTPATIENT
Start: 2020-06-18 | End: 2020-06-17

## 2020-06-17 RX ORDER — SODIUM CHLORIDE 0.9 % (FLUSH) 0.9 %
10 SYRINGE (ML) INJECTION PRN
Status: DISCONTINUED | OUTPATIENT
Start: 2020-06-17 | End: 2020-06-20 | Stop reason: HOSPADM

## 2020-06-17 RX ORDER — METOCLOPRAMIDE HYDROCHLORIDE 5 MG/ML
10 INJECTION INTRAMUSCULAR; INTRAVENOUS ONCE
Status: COMPLETED | OUTPATIENT
Start: 2020-06-17 | End: 2020-06-17

## 2020-06-17 RX ORDER — DIPHENHYDRAMINE HYDROCHLORIDE 50 MG/ML
12.5 INJECTION INTRAMUSCULAR; INTRAVENOUS ONCE
Status: COMPLETED | OUTPATIENT
Start: 2020-06-17 | End: 2020-06-17

## 2020-06-17 RX ORDER — POLYETHYLENE GLYCOL 3350 17 G/17G
17 POWDER, FOR SOLUTION ORAL DAILY PRN
Status: DISCONTINUED | OUTPATIENT
Start: 2020-06-17 | End: 2020-06-20 | Stop reason: HOSPADM

## 2020-06-17 RX ORDER — ATORVASTATIN CALCIUM 40 MG/1
40 TABLET, FILM COATED ORAL NIGHTLY
Status: DISCONTINUED | OUTPATIENT
Start: 2020-06-17 | End: 2020-06-20 | Stop reason: HOSPADM

## 2020-06-17 RX ORDER — HYDROCODONE BITARTRATE AND ACETAMINOPHEN 7.5; 325 MG/1; MG/1
1 TABLET ORAL EVERY 6 HOURS PRN
Status: DISCONTINUED | OUTPATIENT
Start: 2020-06-17 | End: 2020-06-20 | Stop reason: HOSPADM

## 2020-06-17 RX ORDER — 0.9 % SODIUM CHLORIDE 0.9 %
1000 INTRAVENOUS SOLUTION INTRAVENOUS ONCE
Status: COMPLETED | OUTPATIENT
Start: 2020-06-17 | End: 2020-06-17

## 2020-06-17 RX ORDER — LABETALOL 20 MG/4 ML (5 MG/ML) INTRAVENOUS SYRINGE
10 EVERY 4 HOURS PRN
Status: DISCONTINUED | OUTPATIENT
Start: 2020-06-17 | End: 2020-06-20 | Stop reason: HOSPADM

## 2020-06-17 RX ORDER — MORPHINE SULFATE 4 MG/ML
4 INJECTION, SOLUTION INTRAMUSCULAR; INTRAVENOUS ONCE
Status: COMPLETED | OUTPATIENT
Start: 2020-06-17 | End: 2020-06-17

## 2020-06-17 RX ORDER — ONDANSETRON 2 MG/ML
4 INJECTION INTRAMUSCULAR; INTRAVENOUS ONCE
Status: COMPLETED | OUTPATIENT
Start: 2020-06-17 | End: 2020-06-17

## 2020-06-17 RX ORDER — SODIUM CHLORIDE 9 MG/ML
50 INJECTION, SOLUTION INTRAVENOUS ONCE
Status: COMPLETED | OUTPATIENT
Start: 2020-06-17 | End: 2020-06-17

## 2020-06-17 RX ORDER — SODIUM CHLORIDE 0.9 % (FLUSH) 0.9 %
10 SYRINGE (ML) INJECTION EVERY 12 HOURS SCHEDULED
Status: DISCONTINUED | OUTPATIENT
Start: 2020-06-17 | End: 2020-06-20 | Stop reason: HOSPADM

## 2020-06-17 RX ORDER — PANTOPRAZOLE SODIUM 40 MG/1
40 TABLET, DELAYED RELEASE ORAL
Status: DISCONTINUED | OUTPATIENT
Start: 2020-06-17 | End: 2020-06-18

## 2020-06-17 RX ADMIN — DIPHENHYDRAMINE HYDROCHLORIDE 12.5 MG: 50 INJECTION, SOLUTION INTRAMUSCULAR; INTRAVENOUS at 14:49

## 2020-06-17 RX ADMIN — METOCLOPRAMIDE HYDROCHLORIDE 10 MG: 5 INJECTION INTRAMUSCULAR; INTRAVENOUS at 14:50

## 2020-06-17 RX ADMIN — PHYTONADIONE 10 MG: 10 INJECTION, EMULSION INTRAMUSCULAR; INTRAVENOUS; SUBCUTANEOUS at 15:59

## 2020-06-17 RX ADMIN — SODIUM CHLORIDE 1000 ML: 9 INJECTION, SOLUTION INTRAVENOUS at 14:49

## 2020-06-17 RX ADMIN — ONDANSETRON 4 MG: 2 INJECTION INTRAMUSCULAR; INTRAVENOUS at 14:50

## 2020-06-17 RX ADMIN — PROTHROMBIN, COAGULATION FACTOR VII HUMAN, COAGULATION FACTOR IX HUMAN, COAGULATION FACTOR X HUMAN, PROTEIN C, PROTEIN S HUMAN, AND WATER 2500 UNITS: KIT at 16:00

## 2020-06-17 RX ADMIN — HYDROCODONE BITARTRATE AND ACETAMINOPHEN 1 TABLET: 7.5; 325 TABLET ORAL at 21:52

## 2020-06-17 RX ADMIN — ATORVASTATIN CALCIUM 40 MG: 40 TABLET, FILM COATED ORAL at 21:53

## 2020-06-17 RX ADMIN — MORPHINE SULFATE 4 MG: 4 INJECTION, SOLUTION INTRAMUSCULAR; INTRAVENOUS at 17:37

## 2020-06-17 RX ADMIN — PANTOPRAZOLE SODIUM 40 MG: 40 TABLET, DELAYED RELEASE ORAL at 21:53

## 2020-06-17 RX ADMIN — MORPHINE SULFATE 4 MG: 4 INJECTION, SOLUTION INTRAMUSCULAR; INTRAVENOUS at 14:50

## 2020-06-17 RX ADMIN — Medication 10 ML: at 21:52

## 2020-06-17 RX ADMIN — SODIUM CHLORIDE 50 ML: 900 INJECTION INTRAVENOUS at 16:17

## 2020-06-17 ASSESSMENT — PAIN DESCRIPTION - DESCRIPTORS
DESCRIPTORS: HEADACHE

## 2020-06-17 ASSESSMENT — PAIN DESCRIPTION - PROGRESSION
CLINICAL_PROGRESSION: GRADUALLY WORSENING
CLINICAL_PROGRESSION: GRADUALLY IMPROVING

## 2020-06-17 ASSESSMENT — PAIN SCALES - GENERAL
PAINLEVEL_OUTOF10: 5
PAINLEVEL_OUTOF10: 8
PAINLEVEL_OUTOF10: 7
PAINLEVEL_OUTOF10: 4
PAINLEVEL_OUTOF10: 8
PAINLEVEL_OUTOF10: 8
PAINLEVEL_OUTOF10: 6
PAINLEVEL_OUTOF10: 8

## 2020-06-17 ASSESSMENT — PAIN DESCRIPTION - LOCATION
LOCATION: HEAD

## 2020-06-17 ASSESSMENT — PAIN DESCRIPTION - FREQUENCY
FREQUENCY: CONTINUOUS
FREQUENCY: CONTINUOUS

## 2020-06-17 ASSESSMENT — PAIN DESCRIPTION - ONSET
ONSET: ON-GOING
ONSET: ON-GOING

## 2020-06-17 ASSESSMENT — PAIN DESCRIPTION - PAIN TYPE
TYPE: ACUTE PAIN

## 2020-06-17 ASSESSMENT — PAIN - FUNCTIONAL ASSESSMENT
PAIN_FUNCTIONAL_ASSESSMENT: PREVENTS OR INTERFERES SOME ACTIVE ACTIVITIES AND ADLS
PAIN_FUNCTIONAL_ASSESSMENT: ACTIVITIES ARE NOT PREVENTED

## 2020-06-17 ASSESSMENT — PAIN DESCRIPTION - DIRECTION: RADIATING_TOWARDS: FRONTAL

## 2020-06-17 NOTE — ED NOTES
@9987 NP-Jeannie Cam spoke to [facility] ([facility])  Re: headache coumadin bilateral subdural fluid  @1542 NPTrupti spoke to [facility] ([facility]) who accepted pt   ([facility]) later called & accepted pt for in-pt transfer to [facility].  To [facility] # [id number]  1st care ETA @1800     Roderic Pata Naegele  06/17/20 0221

## 2020-06-17 NOTE — ED PROVIDER NOTES
[facility] Emergency Department    CHIEF COMPLAINT  Headache (pt reporting a headache x 2 days. states \"its making me throw up\" ) and Emesis      HISTORY OF PRESENT ILLNESS  Flavia Orellana is a 63 y.o. male who presents to the ED complaining of headache and emesis. Patient reports that headache started approximately 1 week ago he reports that he woke up with a frontal, throbbing, aching, pulsating headache that has gradually worsened over the last week. Patient reports the headache is now severe, constant, and he has associated nausea, vomiting, photophobia. Patient denies any history of migraine headaches, denies injury or trauma. Patient is anticoagulated on Coumadin. Patient denies fever, chills, body aches, numbness, tingling, extremity weakness, chest pain, shortness of breath, abdominal pain, diarrhea, urinary complaints. Patient did take Tylenol with no relief. No other complaints, modifying factors or associated symptoms. Nursing notes reviewed.    Past Medical History:   Diagnosis Date    Asthma     Cancer ([address]     skin    Deep vein thrombosis (DVT) (HCC) in legs and traveled to lungs    FH: CAD (coronary artery disease) 1/8/2014    Former smoker 1/8/2014    GERD (gastroesophageal reflux disease)     High cholesterol     Hx of blood clots     Lung mass     Lung nodule 6/3/2012    histoplasmosis    S/P thoracotomy 7/9/2012    Right thoracotomy, RUL excisional biopsy of nodule with FS, 5 level intercostal nerve block          Past Surgical History:   Procedure Laterality Date    APPENDECTOMY      1996    COLONOSCOPY      COLONOSCOPY N/A 1/24/2019    COLONOSCOPY performed by Dalia Perez MD at [place]  05/12/14    WNL  - no stents    ENDOSCOPY, COLON, DIAGNOSTIC      EYE SURGERY      laser surgery     FOOT SURGERY Right     bone spur    KNEE ARTHROSCOPY Left     X 2    THORACOTOMY  7-9-2012    Right Medication Dose Route Frequency Provider Last Rate Last Dose    0.9 % sodium chloride bolus  1,000 mL Intravenous Once Jeannie A Thomass, APRN - CNP        ondansetron (ZOFRAN) injection 4 mg  4 mg Intravenous Once Jeannie A Thomass, APRN - CNP        diphenhydrAMINE (BENADRYL) injection 12.5 mg  12.5 mg Intravenous Once Jeannie A Thomass, APRN - CNP        metoclopramide (REGLAN) injection 10 mg  10 mg Intravenous Once Jeannie A Julietariss, APRN - CNP        morphine (PF) injection 4 mg  4 mg Intravenous Once Jeannie A Julietariss, APRN - CNP         Current Outpatient Medications   Medication Sig Dispense Refill    atorvastatin (LIPITOR) 40 MG tablet TAKE ONE TABLET BY MOUTH NIGHTLY 90 tablet 3    warfarin (COUMADIN) 5 MG tablet TAKE 1 TABLET BY MOUTH ONE TIME A DAY 90 tablet 0    omeprazole (PRILOSEC) 10 MG delayed release capsule Take 10 mg by mouth daily      alum Hydroxide-Mag Carbonate 160-105 MG CHEW Take 2 tablets by mouth daily      albuterol sulfate HFA (PROAIR HFA) 108 (90 BASE) MCG/ACT inhaler Inhale 2 puffs into the lungs every 6 hours as needed for Wheezing 1 Inhaler 3     Allergies   Allergen Reactions    Bee Venom Anaphylaxis     Has epi pen    Nutritional Supplements Anaphylaxis    Penicillins Hives       REVIEW OF SYSTEMS  10 systems reviewed, pertinent positives per HPI otherwise noted to be negative    PHYSICAL EXAM  BP (!) 144/83   Pulse 75   Temp 98 °F (36.7 °C) (Oral)   Resp 17   Ht 6' 5\" (1.956 m)   Wt 220 lb (99.8 kg)   SpO2 97%   BMI 26.09 kg/m²   GENERAL APPEARANCE: Awake and alert. Cooperative. Patient is in stretcher actively vomiting, dark room, complaining of headache. Vital signs are stable. Well appearing and non toxic. Afebrile. HEAD: Normocephalic. Atraumatic. EYES: PERRL. EOM's grossly intact. ENT: Mucous membranes are moist.   NECK: Supple. Normal ROM. HEART: RRR. Distal pulses are equal and intact. Cap refill less than 2 seconds. LUNGS: Respirations unlabored. hospital     Comment: Please note this report has been produced using speech recognition software and may contain errors related to that system including errors in grammar, punctuation, and spelling, as well as words and phrases that may be inappropriate. If there are any questions or concerns please feel free to contact the dictating provider for clarification.             ZEESHAN Miller - CNP  06/18/20 1936

## 2020-06-18 ENCOUNTER — APPOINTMENT (OUTPATIENT)
Dept: CT IMAGING | Age: 60
DRG: 086 | End: 2020-06-18
Attending: INTERNAL MEDICINE
Payer: COMMERCIAL

## 2020-06-18 LAB
ANION GAP SERPL CALCULATED.3IONS-SCNC: 16 MMOL/L (ref 3–16)
BASOPHILS ABSOLUTE: 0 K/UL (ref 0–0.2)
BASOPHILS RELATIVE PERCENT: 0.3 %
BUN BLDV-MCNC: 9 MG/DL (ref 7–20)
CALCIUM SERPL-MCNC: 8.9 MG/DL (ref 8.3–10.6)
CHLORIDE BLD-SCNC: 103 MMOL/L (ref 99–110)
CO2: 17 MMOL/L (ref 21–32)
CREAT SERPL-MCNC: 0.6 MG/DL (ref 0.8–1.3)
EOSINOPHILS ABSOLUTE: 0.1 K/UL (ref 0–0.6)
EOSINOPHILS RELATIVE PERCENT: 0.6 %
GFR AFRICAN AMERICAN: >60
GFR NON-AFRICAN AMERICAN: >60
GLUCOSE BLD-MCNC: 116 MG/DL (ref 70–99)
HCT VFR BLD CALC: 45 % (ref 40.5–52.5)
HEMOGLOBIN: 15.4 G/DL (ref 13.5–17.5)
INR BLD: 1.15 (ref 0.86–1.14)
LYMPHOCYTES ABSOLUTE: 3 K/UL (ref 1–5.1)
LYMPHOCYTES RELATIVE PERCENT: 26.1 %
MCH RBC QN AUTO: 32 PG (ref 26–34)
MCHC RBC AUTO-ENTMCNC: 34.2 G/DL (ref 31–36)
MCV RBC AUTO: 93.5 FL (ref 80–100)
MONOCYTES ABSOLUTE: 0.8 K/UL (ref 0–1.3)
MONOCYTES RELATIVE PERCENT: 6.5 %
NEUTROPHILS ABSOLUTE: 7.8 K/UL (ref 1.7–7.7)
NEUTROPHILS RELATIVE PERCENT: 66.5 %
PDW BLD-RTO: 13.1 % (ref 12.4–15.4)
PLATELET # BLD: 257 K/UL (ref 135–450)
PMV BLD AUTO: 7.9 FL (ref 5–10.5)
POTASSIUM REFLEX MAGNESIUM: 4.1 MMOL/L (ref 3.5–5.1)
PROTHROMBIN TIME: 13.4 SEC (ref 10–13.2)
RBC # BLD: 4.82 M/UL (ref 4.2–5.9)
REPORT: NORMAL
SARS-COV-2: NOT DETECTED
SODIUM BLD-SCNC: 136 MMOL/L (ref 136–145)
THIS TEST SENT TO: NORMAL
WBC # BLD: 11.7 K/UL (ref 4–11)

## 2020-06-18 PROCEDURE — 6370000000 HC RX 637 (ALT 250 FOR IP): Performed by: INTERNAL MEDICINE

## 2020-06-18 PROCEDURE — 94760 N-INVAS EAR/PLS OXIMETRY 1: CPT

## 2020-06-18 PROCEDURE — 85610 PROTHROMBIN TIME: CPT

## 2020-06-18 PROCEDURE — 2580000003 HC RX 258: Performed by: INTERNAL MEDICINE

## 2020-06-18 PROCEDURE — 70450 CT HEAD/BRAIN W/O DYE: CPT

## 2020-06-18 PROCEDURE — 80048 BASIC METABOLIC PNL TOTAL CA: CPT

## 2020-06-18 PROCEDURE — 1200000000 HC SEMI PRIVATE

## 2020-06-18 PROCEDURE — 2500000003 HC RX 250 WO HCPCS: Performed by: INTERNAL MEDICINE

## 2020-06-18 PROCEDURE — 36415 COLL VENOUS BLD VENIPUNCTURE: CPT

## 2020-06-18 PROCEDURE — 6360000002 HC RX W HCPCS: Performed by: INTERNAL MEDICINE

## 2020-06-18 PROCEDURE — 85025 COMPLETE CBC W/AUTO DIFF WBC: CPT

## 2020-06-18 RX ORDER — DEXAMETHASONE 4 MG/1
2 TABLET ORAL EVERY 12 HOURS SCHEDULED
Status: DISCONTINUED | OUTPATIENT
Start: 2020-06-18 | End: 2020-06-20 | Stop reason: HOSPADM

## 2020-06-18 RX ORDER — PANTOPRAZOLE SODIUM 40 MG/1
40 TABLET, DELAYED RELEASE ORAL NIGHTLY
Status: DISCONTINUED | OUTPATIENT
Start: 2020-06-18 | End: 2020-06-20 | Stop reason: HOSPADM

## 2020-06-18 RX ADMIN — Medication 10 ML: at 09:21

## 2020-06-18 RX ADMIN — ACETAMINOPHEN 650 MG: 325 TABLET ORAL at 03:56

## 2020-06-18 RX ADMIN — HYDROCODONE BITARTRATE AND ACETAMINOPHEN 1 TABLET: 7.5; 325 TABLET ORAL at 07:32

## 2020-06-18 RX ADMIN — PANTOPRAZOLE SODIUM 40 MG: 40 TABLET, DELAYED RELEASE ORAL at 20:07

## 2020-06-18 RX ADMIN — ATORVASTATIN CALCIUM 40 MG: 40 TABLET, FILM COATED ORAL at 19:59

## 2020-06-18 RX ADMIN — ONDANSETRON 4 MG: 2 INJECTION INTRAMUSCULAR; INTRAVENOUS at 10:19

## 2020-06-18 RX ADMIN — ONDANSETRON 4 MG: 2 INJECTION INTRAMUSCULAR; INTRAVENOUS at 04:20

## 2020-06-18 RX ADMIN — LABETALOL 20 MG/4 ML (5 MG/ML) INTRAVENOUS SYRINGE 10 MG: at 03:57

## 2020-06-18 RX ADMIN — PANTOPRAZOLE SODIUM 40 MG: 40 TABLET, DELAYED RELEASE ORAL at 09:21

## 2020-06-18 RX ADMIN — Medication 10 ML: at 20:06

## 2020-06-18 RX ADMIN — ACETAMINOPHEN 650 MG: 325 TABLET ORAL at 20:00

## 2020-06-18 ASSESSMENT — PAIN DESCRIPTION - PAIN TYPE
TYPE: ACUTE PAIN

## 2020-06-18 ASSESSMENT — PAIN DESCRIPTION - LOCATION
LOCATION: HEAD

## 2020-06-18 ASSESSMENT — PAIN DESCRIPTION - FREQUENCY
FREQUENCY: CONTINUOUS

## 2020-06-18 ASSESSMENT — PAIN - FUNCTIONAL ASSESSMENT
PAIN_FUNCTIONAL_ASSESSMENT: PREVENTS OR INTERFERES SOME ACTIVE ACTIVITIES AND ADLS
PAIN_FUNCTIONAL_ASSESSMENT: PREVENTS OR INTERFERES SOME ACTIVE ACTIVITIES AND ADLS

## 2020-06-18 ASSESSMENT — PAIN SCALES - GENERAL
PAINLEVEL_OUTOF10: 0
PAINLEVEL_OUTOF10: 10
PAINLEVEL_OUTOF10: 3
PAINLEVEL_OUTOF10: 10
PAINLEVEL_OUTOF10: 10
PAINLEVEL_OUTOF10: 8
PAINLEVEL_OUTOF10: 3
PAINLEVEL_OUTOF10: 3
PAINLEVEL_OUTOF10: 8

## 2020-06-18 ASSESSMENT — PAIN DESCRIPTION - PROGRESSION
CLINICAL_PROGRESSION: NOT CHANGED
CLINICAL_PROGRESSION: GRADUALLY IMPROVING
CLINICAL_PROGRESSION: NOT CHANGED

## 2020-06-18 ASSESSMENT — PAIN DESCRIPTION - ONSET
ONSET: ON-GOING

## 2020-06-18 ASSESSMENT — PAIN DESCRIPTION - DESCRIPTORS
DESCRIPTORS: POUNDING

## 2020-06-18 ASSESSMENT — PAIN DESCRIPTION - ORIENTATION
ORIENTATION: POSTERIOR

## 2020-06-18 NOTE — CARE COORDINATION
[phone] Fax: [phone]      Potential assistance Purchasing Medications: Potential Assistance Purchasing Medications: Yes  Does Patient want to participate in local refill/ meds to beds program?: Yes    Meds To Beds General Rules:  1. Can ONLY be done Monday- Friday between 8:30am-5pm  2. Prescription(s) must be in pharmacy by 3pm to be filled same day  3. Copy of patient's insurance/ prescription drug card and patient face sheet must be sent along with the prescription(s)  4. Cost of Rx cannot be added to hospital bill. If financial assistance is needed, please contact unit  or ;  or  CANNOT provide pharmacy voucher for patients co-pays  5.  Patients can then  the prescription on their way out of the hospital at discharge, or pharmacy can deliver to the bedside if staff is available. (payment due at time of pick-up or delivery - cash, check, or card accepted)     Able to afford home medications/ co-pay costs: Yes    ADLS  Support Systems: Spouse/Significant Other    PT AM-PAC:   /24  OT AM-PAC:   /24    [place]: from home with spouse  Steps:     Plans to RETURN to current housing: Yes  Barriers to RETURNING to current housing:     [address] 77  Currently ACTIVE with [address]: No  Home Care Agency: Not Applicable    Currently ACTIVE with [place] on Aging: No  Passport/ Waiver: No  Passport/ Waiver Services: Not Applicable    :  Phone:       [address]  [facility] Provider:   Equipment:     Home Oxygen and [address] prior to admission: [address]: Not Applicable  Other Respiratory Equipment:     Informed of need to bring portable home O2 tank on day of DISCHARGE for nursing to connect prior to leaving: No  Verbalized agreement/Understanding: No  Person to bring portable tank at discharge:     Dialysis  Active with HD/PD prior to admission: No  Nephrologist:     HD Center:  Not Applicable    DISCHARGE PLAN:  Disposition: Home- No Services Needed    Transportation PLAN for discharge: family     Factors facilitating achievement of predicted outcomes: Family support, Cooperative and Pleasant    Barriers to discharge: Medical complications and Medication managment    Additional Case Management Notes: The Plan for Transition of Care is related to the following treatment goals of Subdural hematoma ([facility] [address] [S06.5X9A]    The Patient and/or patient representative Anirudh Higgins and his family were provided with a choice of provider and agrees with the discharge plan Yes    Freedom of choice list was provided with basic dialogue that supports the patient's individualized plan of care/goals and shares the quality data associated with the providers.  Yes    Care Transition patient: Yes    Kirill Wick RN  [facility]  Case Management Department  Ph: [phone]   Fax: [phone]

## 2020-06-18 NOTE — PROGRESS NOTES
to person, place, and time. Psychiatric:         Mood and Affect: Mood normal.         Behavior: Behavior normal.               Labs      Recent Labs     06/17/20  1333 06/18/20  0428   WBC 6.6 11.7*   HGB 16.7 15.4   HCT 48.5 45.0    257   MCV 92.6 93.5        Recent Labs     06/17/20  1333 06/18/20  0428    136   K 3.7 4.1    103   CO2 24 17*   BUN 9 9   CREATININE 0.7* 0.6*       Recent Labs     06/17/20  1333   AST 23   ALT 30   BILITOT 0.7   ALKPHOS 72       No results for input(s): MG in the last 72 hours. Radiology  CT HEAD WO CONTRAST   Final Result     No substantial change. Assessment and Plan:     SDH  7 mm in size. No midline shift. Likely secondary to fall 1 week ago, being on anticoagulant. Holding Coumadin. INR reversed. Neurosurgery consultation. History of recurrent pulmonary embolism. Patient has seen hematology, Dr. Domingo Rachel as outpatient. From the note I can see that he had a negative hypercoagulable work-up. Coumadin needs to be off for at least 2 weeks. However awaiting on final recommendations from hematology oncology. Orthostatic tremor. PT OT. Check orthostatic BP.          Guillermo Fragoso MD  6/18/2020

## 2020-06-18 NOTE — CONSULTS
COLONOSCOPY performed by Dalia Perez MD at [address]  14    WNL  - no stents    ENDOSCOPY, COLON, DIAGNOSTIC      EYE SURGERY      laser surgery     FOOT SURGERY Right     bone spur    KNEE ARTHROSCOPY Left     X 2    THORACOTOMY  2012    Right thoracotomy, right upper lobe excisional biopsy with frozen section 5 level intercostal nerve block    TONSILLECTOMY         Social History     Occupational History    Not on file   Tobacco Use    Smoking status: Former Smoker     Packs/day: 1.00     Years: 25.00     Pack years: 25.00     Types: Cigarettes     Last attempt to quit: 2000     Years since quittin.0    Smokeless tobacco: Never Used   Substance and Sexual Activity    Alcohol use: No    Drug use: No    Sexual activity: Yes     Partners: Female        Family History   Problem Relation Age of Onset    Diabetes Mother     Parkinsonism Mother     Cancer Mother         MASTECTOMY, BREAST CA, SPREAD    Heart Disease Father         CHF    Heart Disease Brother         CABG 3 VESSELLS    Other Brother         CHROHN'S DISEASE        Outpatient Medications Marked as Taking for the 20 encounter [facility] Encounter)   Medication Sig Dispense Refill    atorvastatin (LIPITOR) 40 MG tablet TAKE ONE TABLET BY MOUTH NIGHTLY 90 tablet 3    warfarin (COUMADIN) 5 MG tablet TAKE 1 TABLET BY MOUTH ONE TIME A DAY 90 tablet 0    omeprazole (PRILOSEC) 10 MG delayed release capsule Take 10 mg by mouth daily        Current Facility-Administered Medications   Medication Dose Route Frequency Provider Last Rate Last Dose    atorvastatin (LIPITOR) tablet 40 mg  40 mg Oral Nightly Kaila Guillory MD   40 mg at 20 2153    sodium chloride flush 0.9 % injection 10 mL  10 mL Intravenous 2 times per day Kaila Guillory MD   10 mL at 20 0921    sodium chloride flush 0.9 % injection 10 mL  10 mL Intravenous PRN Kaila Guillory MD        acetaminophen (TYLENOL) tablet 650 mg  650 mg Oral Q6H PRN Jael Leyva MD   650 mg at 06/18/20 3994    Or    acetaminophen (TYLENOL) suppository 650 mg  650 mg Rectal Q6H PRN Jael Leyva MD        polyethylene glycol [facility]) packet 17 g  17 g Oral Daily PRN Jael Leyva MD        promethazine (PHENERGAN) tablet 12.5 mg  12.5 mg Oral Q6H PRELADIO Leyva MD        Or    ondansetron [facility]) injection 4 mg  4 mg Intravenous Q6H PRN Jael Leyva MD   4 mg at 06/18/20 1019    HYDROcodone-acetaminophen (NORCO) 7.5-325 MG per tablet 1 tablet  1 tablet Oral Q6H PRELADIO Leyva MD   1 tablet at 06/18/20 0732    labetalol (NORMODYNE;TRANDATE) injection syringe 10 mg  10 mg Intravenous Q4H PRELADIO Leyva MD   10 mg at 06/18/20 0357    pantoprazole (PROTONIX) tablet 40 mg  40 mg Oral QAM AC Jael Leyva MD   40 mg at 06/18/20 5545      Objective:  BP (!) 166/90   Pulse 53   Temp 98.3 °F (36.8 °C) (Oral)   Resp 16   Ht 6' 5\" (1.956 m)   Wt 214 lb 15.2 oz (97.5 kg)   SpO2 95%   BMI 25.49 kg/m²     Physical Exam: Patient was seen during Covid-19 pandemic and all efforts made to respect recommendations of social distancing and decrease PPE have been made. Unable to perform face to face examination of the patient. Exam taken from nurse. Radiological Findings:  CT head 6/17/20 1427: 1. 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 This is a new finding since prior exam of July 27, 2018. 2. Diffuse edema with effacement of the sulci and relative decrease in size of the ventricles compared to prior exam.     CT head 6/18/20 0500: Small amount of subdural fluid, likely represents subacute or chronic hemorrhage over bilateral cerebrum is unchanged.   Loss of sulcal spaces may suggest generalized brain edema     Labs  Recent Labs     06/17/20  1333 06/18/20  0428    136    103   CO2 24 17*   BUN 9 9   CREATININE 0.7* 0.6*

## 2020-06-18 NOTE — H&P
GENERAL;  Diet NPO, After Midnight  Code Status: Full Code    Dispo - pending clinical course       Jael Leyva MD  The note was completed using EMR. Every effort was made to ensure accuracy; however, inadvertent computerized transcription errors may be present. Thank you Richie Kussmaul, APRN - CNP for the opportunity to be involved in this patient's care. If you have any questions or concerns please feel free to contact me at [phone].

## 2020-06-19 PROCEDURE — 97535 SELF CARE MNGMENT TRAINING: CPT

## 2020-06-19 PROCEDURE — 6360000002 HC RX W HCPCS: Performed by: INTERNAL MEDICINE

## 2020-06-19 PROCEDURE — 97161 PT EVAL LOW COMPLEX 20 MIN: CPT

## 2020-06-19 PROCEDURE — 97116 GAIT TRAINING THERAPY: CPT

## 2020-06-19 PROCEDURE — 1200000000 HC SEMI PRIVATE

## 2020-06-19 PROCEDURE — 51798 US URINE CAPACITY MEASURE: CPT

## 2020-06-19 PROCEDURE — 97530 THERAPEUTIC ACTIVITIES: CPT

## 2020-06-19 PROCEDURE — 6360000002 HC RX W HCPCS: Performed by: NEUROLOGICAL SURGERY

## 2020-06-19 PROCEDURE — 6370000000 HC RX 637 (ALT 250 FOR IP): Performed by: INTERNAL MEDICINE

## 2020-06-19 PROCEDURE — 2580000003 HC RX 258: Performed by: INTERNAL MEDICINE

## 2020-06-19 PROCEDURE — 97166 OT EVAL MOD COMPLEX 45 MIN: CPT

## 2020-06-19 RX ORDER — METOCLOPRAMIDE HYDROCHLORIDE 5 MG/ML
5 INJECTION INTRAMUSCULAR; INTRAVENOUS EVERY 6 HOURS
Status: COMPLETED | OUTPATIENT
Start: 2020-06-19 | End: 2020-06-19

## 2020-06-19 RX ORDER — METOCLOPRAMIDE HYDROCHLORIDE 5 MG/ML
5 INJECTION INTRAMUSCULAR; INTRAVENOUS EVERY 6 HOURS
Status: DISCONTINUED | OUTPATIENT
Start: 2020-06-19 | End: 2020-06-19

## 2020-06-19 RX ADMIN — ACETAMINOPHEN 650 MG: 650 SUPPOSITORY RECTAL at 05:30

## 2020-06-19 RX ADMIN — ACETAMINOPHEN 650 MG: 325 TABLET ORAL at 17:30

## 2020-06-19 RX ADMIN — ACETAMINOPHEN 650 MG: 325 TABLET ORAL at 11:30

## 2020-06-19 RX ADMIN — HYDROCODONE BITARTRATE AND ACETAMINOPHEN 1 TABLET: 7.5; 325 TABLET ORAL at 21:06

## 2020-06-19 RX ADMIN — Medication 10 ML: at 09:28

## 2020-06-19 RX ADMIN — DEXAMETHASONE 2 MG: 4 TABLET ORAL at 20:36

## 2020-06-19 RX ADMIN — DEXAMETHASONE 2 MG: 4 TABLET ORAL at 01:12

## 2020-06-19 RX ADMIN — Medication 10 ML: at 11:35

## 2020-06-19 RX ADMIN — ATORVASTATIN CALCIUM 40 MG: 40 TABLET, FILM COATED ORAL at 20:37

## 2020-06-19 RX ADMIN — PANTOPRAZOLE SODIUM 40 MG: 40 TABLET, DELAYED RELEASE ORAL at 20:36

## 2020-06-19 RX ADMIN — Medication 10 ML: at 21:28

## 2020-06-19 RX ADMIN — Medication 10 ML: at 17:20

## 2020-06-19 RX ADMIN — DEXAMETHASONE 2 MG: 4 TABLET ORAL at 09:16

## 2020-06-19 RX ADMIN — POLYETHYLENE GLYCOL 3350 17 G: 17 POWDER, FOR SOLUTION ORAL at 09:59

## 2020-06-19 RX ADMIN — METOCLOPRAMIDE 5 MG: 5 INJECTION, SOLUTION INTRAMUSCULAR; INTRAVENOUS at 11:34

## 2020-06-19 RX ADMIN — METOCLOPRAMIDE 5 MG: 5 INJECTION, SOLUTION INTRAMUSCULAR; INTRAVENOUS at 17:20

## 2020-06-19 ASSESSMENT — PAIN DESCRIPTION - LOCATION
LOCATION: HEAD
LOCATION: HEAD

## 2020-06-19 ASSESSMENT — PAIN DESCRIPTION - PAIN TYPE
TYPE: ACUTE PAIN
TYPE: ACUTE PAIN

## 2020-06-19 ASSESSMENT — PAIN DESCRIPTION - DESCRIPTORS
DESCRIPTORS: HEADACHE
DESCRIPTORS: HEADACHE

## 2020-06-19 ASSESSMENT — PAIN SCALES - GENERAL
PAINLEVEL_OUTOF10: 4
PAINLEVEL_OUTOF10: 0
PAINLEVEL_OUTOF10: 0
PAINLEVEL_OUTOF10: 3
PAINLEVEL_OUTOF10: 1
PAINLEVEL_OUTOF10: 6

## 2020-06-19 ASSESSMENT — PAIN DESCRIPTION - PROGRESSION
CLINICAL_PROGRESSION: NOT CHANGED

## 2020-06-19 ASSESSMENT — PAIN DESCRIPTION - FREQUENCY: FREQUENCY: CONTINUOUS

## 2020-06-19 ASSESSMENT — PAIN DESCRIPTION - ONSET: ONSET: AWAKENED FROM SLEEP

## 2020-06-19 NOTE — PROGRESS NOTES
Progress Note    Admit Date: 6/17/2020  Day: 2  Diet: DIET GENERAL; Carb Control: 4 carb choices (60 gms)/meal    CC: Headache    Interval history:     Reports persistent Headache (Frontal, throbbing) but is improving. No episodes of emesis overnight. Denies weakness, vision changes, weakness, numbess or tingling in extremities. Denies cp, Abd pain, SOB, dysuria. Last BM 2 days ago (non-bloody). Denies falls at home. Medications:     Scheduled Meds:   pantoprazole  40 mg Oral Nightly    dexamethasone  2 mg Oral 2 times per day    atorvastatin  40 mg Oral Nightly    sodium chloride flush  10 mL Intravenous 2 times per day     Continuous Infusions:  PRN Meds:sodium chloride flush, acetaminophen **OR** acetaminophen, polyethylene glycol, promethazine **OR** ondansetron, HYDROcodone-acetaminophen, labetalol    Objective:   Vitals:   T-max:  Patient Vitals for the past 8 hrs:   BP Temp Temp src Pulse Resp SpO2   06/19/20 0442 111/69 98.1 °F (36.7 °C) Oral 72 18 97 %       Intake/Output Summary (Last 24 hours) at 6/19/2020 0856  Last data filed at 6/18/2020 1715  Gross per 24 hour   Intake 480 ml   Output --   Net 480 ml       Review of Systems  As Per HPI and Interval History. Physical Exam  Constitutional:       General: He is not in acute distress. Appearance: He is ill-appearing. Eyes:      Extraocular Movements: Extraocular movements intact. Cardiovascular:      Rate and Rhythm: Normal rate and regular rhythm. Heart sounds: No murmur. Pulmonary:      Effort: Pulmonary effort is normal.      Breath sounds: No wheezing or rales. Abdominal:      Palpations: Abdomen is soft. Tenderness: There is no abdominal tenderness. Musculoskeletal: Normal range of motion. Right lower leg: No edema. Left lower leg: No edema. Neurological:      General: No focal deficit present. Mental Status: He is alert and oriented to person, place, and time.       Comments: Strength 5/5 in bilateral upper and lower extremities. Sensation to light touch intact on face and bilateral extremities. Patellar Reflexes 2+            LABS:    CBC:   Recent Labs     06/17/20  1333 06/18/20  0428   WBC 6.6 11.7*   HGB 16.7 15.4   HCT 48.5 45.0    257   MCV 92.6 93.5     Renal:    Recent Labs     06/17/20  1333 06/18/20  0428    136   K 3.7 4.1    103   CO2 24 17*   BUN 9 9   CREATININE 0.7* 0.6*   GLUCOSE 138* 116*   CALCIUM 9.4 8.9   ANIONGAP 12 16     Hepatic:   Recent Labs     06/17/20  1333   AST 23   ALT 30   BILITOT 0.7   PROT 7.7   LABALBU 4.5   ALKPHOS 72       INR:   Recent Labs     06/17/20  1333 06/17/20  1628 06/18/20  0428   INR 3.01* 1.13 1. 15*     Lactate: No results for input(s): LACTATE in the last 72 hours. Cultures:  -----------------------------------------------------------------  RAD:   CT HEAD WO CONTRAST   Final Result     No substantial change. Assessment/Plan:     Intractable Headache 2/2 subdural hematoma vs migrane  -Norco 7.5-325 mg q6 PRN. Has not required     Bilateral Subdural Hematoma  -Not present in 7/2018. However CT head wo contrast appears chronic. Received kcentra to reverse INR  -Neurosurgery consult: Hold warfarin for 2 weeks, Start on decadron 2 mg BID for 2 weeks. Followup with Dr. Ivy Dotson in 2 weeks. Repeat Head CT in 2 weeks. Hx PE  -Bilateral PE in 2012, on coumadin since then. However hypercoagulable workup -ve at that time.   -Hold warfarin and monitor due to subdural hematomas. HLD  -40 mg Lipitor. PT/OT pending. Code Status: FULL  FEN: Carb Control while on decadron  PPX: SCDs, Protonix while on decadron  DISPO: GMF. Discharge within 24 hrs.      Peter Moraes MD, PGY-1  06/19/20  8:56 AM    This patient has been staffed and discussed with Orestes Hayes MD.

## 2020-06-19 NOTE — PLAN OF CARE
Problem: Pain:  Goal: Control of acute pain  Description: Control of acute pain  Outcome: Ongoing  Note: Patient complains of headache 4/10. States it is relieved with PO tylenol. After medication pain is 1/10. Will continue to monitor. Problem: Falls - Risk of:  Goal: Will remain free from falls  Description: Will remain free from falls  Outcome: Ongoing  Note: Pt free from injury or falls at this time, fall precautions in place, bed in low position, side rail up x2, Daniels Fall Risk: Medium (25-44), bed alarm on, reoriented to room and call light, reminded not to get up without assistance. Pt verbalizes understanding of fall risk procedures. Will continue with hourly rounds for PO intake, pain needs, toileting, and repositioning as needed. No needs expressed at this time. Call light in reach, will continue to monitor.

## 2020-06-19 NOTE — CARE COORDINATION
Case Management Assessment           Daily Note                 Date/ Time of Note: 6/19/2020 9:54 AM         Note completed by: Aisha Chakraborty    Patient Name: Zac Broderick  YOB: 1960    Diagnosis:Subdural hematoma [facility]) [R75.6N8M]  Patient Admission Status: Inpatient    Date of Admission:6/17/2020  7:35 PM Length of Stay: 2 GLOS:      Current Plan of Care: PT/OT evals today, monitoring INR, Headache pain control  ________________________________________________________________________________________  PT AM-PAC: 20 / 24 per last evaluation on: 06/19/20     OT AM-PAC: 22 / 24 per last evaluation on: 06/19/20     DME Needs for discharge: walker  ________________________________________________________________________________________  Discharge Plan: Home    Tentative discharge date: 06/19/20     Current barriers to discharge: medical clearance for DC    Referrals completed: DME: [facility] for new rolling walker    Resources/ information provided: Not indicated at this time  ________________________________________________________________________________________  Case Management Notes: CM continues to follow for discharge planning and needs, patient worked with therapy today and noted to need a rolling walker at discharge. CM noted orders and made referral to [facility] for new dme delivery prior to discharge. Case discussed during AM MD rounds, tentative plan for discharge today per attending MD. Patient to return home with spouse at discharge, declines further needs at this time. Macy Quintana and his family were provided with choice of provider; he and his family are in agreement with the discharge plan.     Care Transition Patient: Yes    Aisha Chakraborty RN  [facility] [employer]  Case Management Department  Ph: [phone]  Fax: [phone]

## 2020-06-19 NOTE — PROGRESS NOTES
Physical Therapy    Facility/Department: [place] [address] [facility]U  Initial Assessment / treatment    NAME: Beto Myers  : 1960  MRN: [id number]    Date of Service: 2020    Discharge Recommendations: Beto Myers scored a 20/24 on the AM-PAC short mobility form. Current research shows that an AM-PAC score of 18 or greater is typically associated with a discharge to the patient's home setting. Based on the patient's AM-PAC score and their current functional mobility deficits, it is recommended that the patient have 2-3 sessions per week of Physical Therapy at d/c to increase the patient's independence. At this time, this patient demonstrates the endurance and safety to discharge home with OP services and a follow up treatment frequency of 2-3x/wk. Please see assessment section for further patient specific details. If patient discharges prior to next session this note will serve as a discharge summary. Please see below for the latest assessment towards goals. PT Equipment Recommendations  Equipment Needed: Yes  Mobility Devices: Brody Bruce: Rolling    Assessment   Body structures, Functions, Activity limitations: Decreased functional mobility ; Decreased balance  Assessment: Pt is 63 y.o. male admit with chronic SDH. Pt is below his functional baseline. Pt admit from home where he is indep including working part time performing physically strenuous activity. Pt requires use of walker and CGA to SBA for safe amb today. Anticipate improved mobility when at home in his usual environment with glasses on (not here today). Rec rolling walker. May benefit from OP PT if progress is slow - discussed with pt who will pursue on his own if he desires.   Pt plans to return home with wife providing assist.  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, drives a truck  Cognition        Objective          AROM RLE (degrees)  RLE AROM: WFL  AROM LLE (degrees)  LLE AROM : WFL  Strength RLE  Strength RLE: WFL  Strength LLE  Strength LLE: WFL  Coordination  Heel to Shin: Normal  Sensation  Overall Sensation Status: WFL(pt denies paresthesias)  Bed mobility  Supine to Sit: Supervision  Scooting: Supervision  Transfers  Sit to Stand: Stand by assistance  Stand to sit: Stand by assistance  Ambulation  Ambulation?: Yes  Ambulation 1  Device: No Device  Assistance: Contact guard assistance  Quality of Gait: narrow MCKENNA, very slow, reaching for furniture, very small steps, guarded posture  Distance: 10 ft x 2  Comments: pt also amb 150 ft with RW and CGA initially progressing to SBA, v/c provided for increased MCKENNA and stride length - some improvement noted     Balance  Sitting - Static: Good  Sitting - Dynamic: Good  Standing - Static: Good  Standing - Dynamic: Fair      Treatment included gait and transfer training, pt education.   Plan   Plan  Times per week: 5-7  Current Treatment Recommendations: Gait Training, Patient/Caregiver Education & Training, Balance Training, Functional Mobility Training, Transfer Training, Stair training, Equipment Evaluation, Education, & procurement  Safety Devices  Type of devices: Chair alarm in place, Nurse notified, Gait belt, Call light within reach, Left in chair    G-Code       OutComes Score                                                  AM-PAC Score  AM-PAC Inpatient Mobility Raw Score : 20 (06/19/20 0924)  AM-PAC Inpatient T-Scale Score : 47.67 (06/19/20 0924)  Mobility Inpatient CMS 0-100% Score: 35.83 (06/19/20 0924)  Mobility Inpatient CMS G-Code Modifier : Ivett Casanova (06/19/20 5158)          Goals  Short term goals  Time Frame for Short term goals: discharge  Short term goal 1: Pt will transfer sit <--> stand with

## 2020-06-19 NOTE — PROGRESS NOTES
Bladder scan completed at 0920. Showed 358mL retained.  Electronically signed by Socorro Pastrana RN on 6/19/2020 at 10:39 AM

## 2020-06-19 NOTE — CONSULTS
Q6H PRN [facility], MD        polyethylene glycol [facility]) packet 17 g  17 g Oral Daily PRN [facility] MD PEDRO        promethazine [facility]) tablet 12.5 mg  12.5 mg Oral Q6H PRN [facility] MD PEDRO        Or    ondansetron [facility]) injection 4 mg  4 mg Intravenous Q6H PRN [facility] MD PEDRO   4 mg at 06/18/20 1019    HYDROcodone-acetaminophen (NORCO) 7.5-325 MG per tablet 1 tablet  1 tablet Oral Q6H PRN [facility] MD PEDRO   1 tablet at 06/18/20 0732    labetalol (NORMODYNE;TRANDATE) injection syringe 10 mg  10 mg Intravenous Q4H PRN [facility], MD   10 mg at 06/18/20 0357      Objective:  /86   Pulse 78   Temp 98.1 °F (36.7 °C) (Oral)   Resp 18   Ht 6' 5\" (1.956 m)   Wt 214 lb 15.2 oz (97.5 kg)   SpO2 97%   BMI 25.49 kg/m²     Physical Exam:   GENERAL: NAD, alert, appears stated age, and cooperative  HEENT: Normocephalic, PERRL, EOMI, neck supple, trachea midline, lips without lesions  RESP: Easy WOB, symmetric chest rise  EXTREMITIES: Extremities WWP  SKIN: Warm and dry  NEURO: A&Ox4, FC - appendicular   CN II-XII grossly intact: no facial droop, EOMI, tongue midline, voice wnl,  hearing intact   CALIXTO spontaneously, FROM, Strength 5/5 x 4, no drift   Tone normal throughout   Sensation intact to light touch throughout   No pathologic reflexes   Gait exam deferred    Radiological Findings:    I personally reviewed the patient's imaging which consists of a CT of the head dated 6/17/2020 and a repeat CT dated 6/18/2020. These demonstrate bilateral convexity chronic subdural hematomas. The cerebral sulci are not well-defined despite the fact that the extra-axial collections do not appear to impart significant mass effect and there is no evidence of significant midline shift.       Labs  Recent Labs     06/17/20  1333 06/18/20  0428    136    103   CO2 24 17*   BUN 9 9   CREATININE 0.7* 0.6*   GLUCOSE 138* 116*     Recent Labs     06/17/20  1333 06/17/20  1628 06/18/20  0428   WBC 6.6  --

## 2020-06-19 NOTE — PROGRESS NOTES
Occupational Therapy   Occupational Therapy Initial Assessment and Treatment Note   Date: 2020   Patient Name: Iris Hernandez  MRN: [id number]     : 1960    Date of Service: 2020    Discharge Recommendations:Pete Ferrara scored a 22/24 on the AM-PAC ADL Inpatient form. Current research shows that an AM-PAC score of 18 or greater is typically associated with a discharge to the patient's home setting. Please see assessment section for further patient specific details. No further OT indicated at d/c. If patient discharges prior to next session this note will serve as a discharge summary. Please see below for the latest assessment towards goals. Home with assist PRN  OT Equipment Recommendations  Equipment Needed: No    Assessment   Performance deficits / Impairments: Decreased functional mobility ; Decreased ADL status; Decreased endurance  Assessment: Pt from home with wife - demo functioning slightly below baseline with LE ADLs and functional moblity. Anticipate good progress toward. No ongoing OT indicated at d/c. Will follow as inpt. Treatment Diagnosis: impaired ADLs /functional mobility 2/2 SDH  Prognosis: Good  Decision Making: Medium Complexity  OT Education: OT Role;Plan of Care;IADL Safety  Patient Education: verb understanding   REQUIRES OT FOLLOW UP: Yes  Activity Tolerance  Activity Tolerance: Patient Tolerated treatment well  Activity Tolerance: pt demo slight hesistation initially - improved with activity / mobility  --No NIXON note   Safety Devices  Safety Devices in place: Yes  Type of devices: Call light within reach; Chair alarm in place;Nurse notified; Left in chair           Patient Diagnosis(es): There were no encounter diagnoses.      has a past medical history of Asthma, Cancer ([facility] [address] Deep vein thrombosis (DVT) ([facility] [address] FH: CAD (coronary artery disease), Former smoker, GERD (gastroesophageal reflux disease), High cholesterol, Hx of blood clots, Lung mass, Lung assistance(improved to SBA -- with / without walker )  Standing Balance  Time: 4 mins x1, 6 mins x 1 and  3mins x 1   Activity: functional transfers /stance at sink and functional mobility in room / hallway / bathroom   Functional Mobility  Functional - Mobility Device: Rolling Walker  Activity: Other  Assist Level: Stand by assistance  Functional Mobility Comments: Pt demo ambulation to/ from bathroom with w/o walker with SBA -- improved independence with time   Toilet Transfers  Toilet - Technique: Ambulating  Equipment Used: Standard toilet  Toilet Transfer: Modified independent; Independent  Toilet Transfers Comments: pt has sink for leverage at home   Tub Transfers  Tub Transfers Comments: pt edu on having spouse present initially at home   ADL  Feeding: Independent  Grooming: Setup(stance at sink for grooming )  LE Dressing: Contact guard assistance;Minimal assistance(simulated with socks / pants )  Toileting: Modified independent (with clothing management / pericare )  Additional Comments: pt edu on sitting for safety and having spouse present with showering initially - verb understanding   Tone RUE  RUE Tone: Normotonic  Tone LUE  LUE Tone: Normotonic  Coordination  Movements Are Fluid And Coordinated: Yes     Bed mobility  Supine to Sit: Supervision  Scooting: Supervision  Transfers  Sit to stand: Modified independent  Stand to sit: Modified independent  Vision - Basic Assessment  Prior Vision: Wears glasses all the time(not present )  Cognition  Overall Cognitive Status: WFL        Sensation  Overall Sensation Status: WFL(pt denies paresthesias)        LUE AROM (degrees)  LUE AROM : WFL  Left Hand AROM (degrees)  Left Hand AROM: WFL  RUE AROM (degrees)  RUE AROM : WFL  Right Hand AROM (degrees)  Right Hand AROM: WFL  LUE Strength  Gross LUE Strength: WFL  L Hand General: 4+/5  RUE Strength  Gross RUE Strength: WFL  R Hand General: 4+/5     Hand Dominance  Hand Dominance: Right     Plan   Plan  Times per week: 5-7x  Times per day: Daily  Current Treatment Recommendations: Functional Mobility Training, Safety Education & Training, Equipment Evaluation, Education, & procurement, Self-Care / ADL, Patient/Caregiver Education & Training    AM-PAC Score  AM-PAC Inpatient Daily Activity Raw Score: 22 (06/19/20 0919)  AM-PAC Inpatient ADL T-Scale Score : 47.1 (06/19/20 0919)  ADL Inpatient CMS 0-100% Score: 25.8 (06/19/20 0919)  ADL Inpatient CMS G-Code Modifier : Brandon Burrell (06/19/20 0919)    Goals  Short term goals  Time Frame for Short term goals: at d/c   Short term goal 1: Stance x 8 mins with supervision for ADLs  Short term goal 2: LE Dressing with Mod independence   Short term goal 3: verb 2  to use at home   Patient Goals   Patient goals : feel normal again     Therapy Time   Individual Concurrent Group Co-treatment   Time In 0804         Time Out 0844         Minutes 40              Timed Code Treatment Minutes:   23 mins     Total Treatment Minutes:  40 mins       Yury Im, OT

## 2020-06-20 VITALS
HEART RATE: 78 BPM | OXYGEN SATURATION: 98 % | WEIGHT: 214.95 LBS | RESPIRATION RATE: 16 BRPM | TEMPERATURE: 97.4 F | BODY MASS INDEX: 25.38 KG/M2 | HEIGHT: 77 IN | DIASTOLIC BLOOD PRESSURE: 83 MMHG | SYSTOLIC BLOOD PRESSURE: 126 MMHG

## 2020-06-20 PROCEDURE — 6360000002 HC RX W HCPCS: Performed by: NEUROLOGICAL SURGERY

## 2020-06-20 PROCEDURE — 2580000003 HC RX 258: Performed by: INTERNAL MEDICINE

## 2020-06-20 PROCEDURE — 6370000000 HC RX 637 (ALT 250 FOR IP): Performed by: INTERNAL MEDICINE

## 2020-06-20 RX ORDER — DEXAMETHASONE 2 MG/1
2 TABLET ORAL EVERY 12 HOURS SCHEDULED
Qty: 20 TABLET | Refills: 0 | Status: SHIPPED | OUTPATIENT
Start: 2020-06-20 | End: 2020-06-30

## 2020-06-20 RX ORDER — DEXAMETHASONE 2 MG/1
2 TABLET ORAL EVERY 12 HOURS SCHEDULED
Qty: 20 TABLET | Refills: 0 | Status: SHIPPED | OUTPATIENT
Start: 2020-06-20 | End: 2020-06-20

## 2020-06-20 RX ADMIN — DEXAMETHASONE 2 MG: 4 TABLET ORAL at 08:32

## 2020-06-20 RX ADMIN — Medication 10 ML: at 08:33

## 2020-06-20 RX ADMIN — ACETAMINOPHEN 650 MG: 325 TABLET ORAL at 05:49

## 2020-06-20 ASSESSMENT — PAIN SCALES - GENERAL: PAINLEVEL_OUTOF10: 3

## 2020-06-20 NOTE — CARE COORDINATION
manager or  CANNOT provide pharmacy voucher for patients co-pays  5. Patients can then  the prescription on their way out of the hospital at discharge, or pharmacy can deliver to the bedside if staff is available. (payment due at time of pick-up or delivery - cash, check, or card accepted)     Able to afford home medications/ co-pay costs: Yes    ADLS:  Current PT AM-PAC Score: 20 /24  Current OT AM-PAC Score: 22 /24      DISCHARGE Disposition: Home- No Services Needed    LOC at discharge: Not Applicable  JERRY Completed: No    Notification completed in HENS/PAS?:  Not Applicable    IMM Completed:   Not Indicated    Transportation:  Transportation PLAN for discharge: family   Mode of Transport: Private [address] [place] [address]:  [facility] Provider: [facility]  Equipment obtained during hospitalization: jacqueline      Akshat Bobjose and his family were provided with choice of provider; he and his family are in agreement with the discharge plan.     Care Transitions patient: No    ALLEN Thomas, [facility] [employer] [employer]  Case Management Department  Ph: [phone]  Fax: [phone]

## 2020-06-22 ENCOUNTER — CARE COORDINATION (OUTPATIENT)
Dept: OTHER | Facility: CLINIC | Age: 60
End: 2020-06-22

## 2020-06-22 NOTE — CARE COORDINATION
[facility] Transitions Initial Follow Up Call    Call within 2 business days of discharge: Yes    Patient: María Mendoza Patient : 1960   MRN: [id number]  Reason for Admission: headache, n/v x 2 days post fall at home   Discharge Date: 20 RARS: Readmission Risk Score: 8      Last Discharge [facility]       Complaint Diagnosis Description Type Department Provider    20   Admission (Discharged) [facility] 4 PCU Sabine George MD    20 Headache; Emesis Subdural hematoma [facility]) ED (TRANSFER) [id number] [facility] ED Concetta Brian MD          Jay Baxter is a 63 y.o. male recently discharged  from [facility] with a reported diagnosis of chronic subdural hematoma. The patient was contacted post discharge and the answers were provided by . Patient/CG had  understanding of reason for admission. Medications were reviewed and the patient had changes to medications. Coaching for symptoms related to disease demonstrates  and demonstrates  self-management skills. .  The patient will receive a follow up call to monitor adherence and understanding as part of Transitions of Care. Ruthy Armstrong  2020    Non-face-to-face services provided:  of new diagnosis, follow up with all new appts made and complete decadron Pt had a fall at home getting out of shower 2 days prior to ED visit, hit his head on the glass shower door, said there was soap on the shower floor and he slipped. Pt has a \"bump\" on his head still but no cut or laceration. Care Transitions 24 Hour Call    Do you have any ongoing symptoms?:  No  Do you have a copy of your discharge instructions?:  Yes  Do you have all of your prescriptions and are they filled?:  Yes  Have you been contacted by a [facility] Salgado Pharmacist?:  No  Have you scheduled your follow up appointment?:  No (Comment: Pt is calling tomorrow to set up appt with pcp and also to get CT head repeated.  Pt is calling neurologist as well. )  Were you discharged with any Home Care or

## 2020-06-23 ENCOUNTER — VIRTUAL VISIT (OUTPATIENT)
Dept: INTERNAL MEDICINE CLINIC | Age: 60
End: 2020-06-23
Payer: COMMERCIAL

## 2020-06-23 PROCEDURE — 99495 TRANSJ CARE MGMT MOD F2F 14D: CPT | Performed by: NURSE PRACTITIONER

## 2020-06-23 PROCEDURE — 1111F DSCHRG MED/CURRENT MED MERGE: CPT | Performed by: NURSE PRACTITIONER

## 2020-06-23 ASSESSMENT — ENCOUNTER SYMPTOMS
SINUS PAIN: 0
NAUSEA: 0
CONSTIPATION: 0
COUGH: 0
SORE THROAT: 0
CHEST TIGHTNESS: 0
DIARRHEA: 0
VOMITING: 0
SHORTNESS OF BREATH: 0

## 2020-06-23 NOTE — PROGRESS NOTES
round, reactive to light  III,IV,VI: Extra Ocular Movements are intact. No nystagmus  V: Facial sensation is intact  VII: Facial strength and movements: intact and symmetric smile,cheek puffing and eyebrow elevation  VIII: Hearing: Intact  IX: Palate elevation is symmetric  XI: Shoulder shrug is intact  XII: Tongue movements are normal    Musculoskeletal: 5/5 in all 4 extremities. Normal tone. Planters: flexor bilaterally. Coordination: no pronator drift, no dysmetria. Finger nose finger testing within normal limits. Sensation: normal to all modalities. Gait/Posture: steady        Skin:        [x] No significant exanthematous lesions or discoloration noted on facial skin         [] Abnormal-            Psychiatric:       [x] Normal Affect [x] No Hallucinations        [] Abnormal-     Other pertinent observable physical exam findings-       Assessment/Plan:  1. SDH (subdural hematoma) (HCC)    - MS DISCHARGE MEDS RECONCILED W/ CURRENT OUTPATIENT MED LIST    2. Chronic anticoagulation    - MS DISCHARGE MEDS RECONCILED W/ CURRENT OUTPATIENT MED LIST      Continue with Dr. Mann Dose follow up/repeat CT. As mentioned by discharging attending, recommend he seek consultation with hematology to determine need for chronic anticoagulation. Continue holding coumadin until otherwise directed. Medical Decision Making: moderate complexity    Encouraged to notify us or 911 ASAP if any previous symptoms recur including worsening headache, unilateral weakness, further dysphasia, loss of sensation, n/v, fever.

## 2020-06-29 ENCOUNTER — CARE COORDINATION (OUTPATIENT)
Dept: OTHER | Facility: CLINIC | Age: 60
End: 2020-06-29

## 2020-07-03 ENCOUNTER — HOSPITAL ENCOUNTER (OUTPATIENT)
Dept: CT IMAGING | Age: 60
Discharge: HOME | End: 2020-07-03
Payer: COMMERCIAL

## 2020-07-03 PROCEDURE — 70450 CT HEAD/BRAIN W/O DYE: CPT

## 2020-07-06 ENCOUNTER — CARE COORDINATION (OUTPATIENT)
Dept: OTHER | Facility: CLINIC | Age: 60
End: 2020-07-06

## 2020-07-06 NOTE — CARE COORDINATION
[facility] Transitions Follow Up Call    2020    Patient: Sulma Gan  Patient : 1960   MRN: [id number]  Reason for Admission: subdural hematoma  Discharge Date: 20 RARS: Readmission Risk Score: 8           Needs to be reviewed by the provider   Additional needs identified to be addressed with provider No  none       Method of communication with provider : none    Care Transition Nurse (CTN) contacted the patient by telephone to follow up after admission on 2020. Verified name and  with patient as identifiers. Addressed changes since last contact: No changes reported by patient since last contact  Discharged needs reviewed: none  Follow up appointment completed? Yes and has future additional follow up with Dr. Key Coppola neurology to review recent CT of the head that was done on 7/3/2020        CTN reviewed discharge instructions, medical action plan and red flags with patient and discussed any barriers to care and/or understanding of plan of care after discharge. Discussed appropriate site of care based on symptoms and resources available to patient including: pcp. The patient agrees to contact the PCP office for questions related to their healthcare. Patients top risk factors for readmission: falls  Interventions to address risk factors: Scheduled appointment with Specialist-Pt has follow up with neurology on 2020    Discussed follow-up appointments. If no appointment was previously scheduled, appointment scheduling offered: n/a Is follow up appointment scheduled within 7 days of discharge? Yes  Non-[facility] follow up appointment(s): Dr Key Coppola neurology     Plan for follow-up call in 10-14 days based on severity of symptoms and risk factors. Plan for next call: review follow up with neurology and CT of head results   CTN provided contact information for future needs.         Care Transitions Subsequent and Final Call    Subsequent and Final Calls  Have your medications changed?: No  Do you have any questions related to your medications?:  No  Do you currently have any active services?:  No  Do you have any needs or concerns that I can assist you with?:  No  Identified Barriers:  None  Care Transitions Interventions  Other Interventions:             Follow Up  Future Appointments   Date Time Provider Maurizio Elder   9/23/2020  2:00 PM ZEESHAN Sarmiento CNP [facility]   10/8/2020  1:45 PM Fany Cordero MD [facility]   1/11/2021 10:00 AM ZEESHAN Sarmiento CNP AND MORTEZA Faust RN

## 2020-07-16 ENCOUNTER — CARE COORDINATION (OUTPATIENT)
Dept: OTHER | Facility: CLINIC | Age: 60
End: 2020-07-16

## 2020-07-16 NOTE — CARE COORDINATION
[facility] Transitions Follow Up Call    2020    Patient: Romana Coto  Patient : 1960   MRN: [id number]  Reason for Admission: subdural hematoma from fall   Discharge Date: 20 RARS: Readmission Risk Score: 8           Needs to be reviewed by the provider   Additional needs identified to be addressed with provider No  none       Method of communication with provider : none    Care Transition Nurse (CTN) contacted the patient by telephone to follow up after admission on 2020. Verified name and  with patient as identifiers. Addressed changes since last contact: follow up appts and CT scan  Discharged needs reviewed: none and n/a  Follow up appointment completed? Yes    Patients top risk factors for readmission: caregiver stress and medical condition  Interventions to address risk factors: Scheduled appointment with PCP-Glendy and Scheduled appointment with Specialist-neurology and hematology    Discussed follow-up appointments. If no appointment was previously scheduled, appointment scheduling offered: n/a Is follow up appointment scheduled within 7 days of discharge? No  Non-[facility] follow up appointment(s): n/a    Pt has reached end of transition period. pt is independent and back to work. He works part time  based on severity of symptoms and risk factors. Plan for next call: Pt has no identified needs, will sign off for COMFORT at this time. CTN provided contact information for future needs. Pt has followed up with neurologist and CT scan of head was clear. Pt said he feels good and is back to work now.          Care Transitions Subsequent and Final Call    Subsequent and Final Calls  Do you have any ongoing symptoms?:  No  Do you have any questions related to your medications?:  No  Do you currently have any active services?:  No  Do you have any needs or concerns that I can assist you with?:  No  Identified Barriers:  None  Care Transitions Interventions  Other Interventions: Follow Up  Future Appointments   Date Time Provider Maurizio Elder   7/21/2020  9:30  So. Zach Aiken   9/23/2020  2:00 PM ZEESHAN Mathis CNP [facility]   10/8/2020  1:45 PM Aydin Miller MD [facility]   1/11/2021 10:00 AM ZEESHAN Mathis CNP AND MORTEZA Pop RN

## 2020-07-21 ENCOUNTER — ANTI-COAG VISIT (OUTPATIENT)
Dept: PHARMACY | Age: 60
End: 2020-07-21
Payer: COMMERCIAL

## 2020-07-21 LAB — INR BLD: 1.8

## 2020-07-21 PROCEDURE — 85610 PROTHROMBIN TIME: CPT

## 2020-07-21 PROCEDURE — 99211 OFF/OP EST MAY X REQ PHY/QHP: CPT

## 2020-07-21 NOTE — PROGRESS NOTES
Mr. Sarmad Garcia has hx of multiple PE s/p 11/2009 and 6/2012. 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 He presents today w/out complaint. Pt. denies any s/sx bleeding/bruising/ swelling/SOB. No missed doses. No changes in Rx/OTC/herbal medications. No major changes in diet, back to normal amount of greens. Pt denies EtOH use. Pt had a bleeding hematoma and was off of coumadin for 4 weeks. Pt reports INR was 3.8. INR 1.8 is below acceptable therapeutic goal range of 2-3. Recommend to continue Warfarin 5mg daily. Rechecking Friday  Pt has 5 mg tablets. Will continue to monitor and check INR in 3 days. Dosing reminder card given with phone number, appointment date and time. Return to clinic: 7/24 @ 1300  Referring provider: Dr. Charlotte Rapp, PharmD Candidate    I have seen the patient and reviewed the progress note written by the PharmD Candidate. I agree with this assesment and plan.    Margarita Lovett, Prieto 7/21/20 10:12 AM

## 2020-07-24 ENCOUNTER — ANTI-COAG VISIT (OUTPATIENT)
Dept: PHARMACY | Age: 60
End: 2020-07-24
Payer: COMMERCIAL

## 2020-07-24 LAB — INR BLD: 2.8

## 2020-07-24 PROCEDURE — 85610 PROTHROMBIN TIME: CPT

## 2020-07-24 PROCEDURE — 99211 OFF/OP EST MAY X REQ PHY/QHP: CPT

## 2020-08-07 ENCOUNTER — ANTI-COAG VISIT (OUTPATIENT)
Dept: PHARMACY | Age: 60
End: 2020-08-07
Payer: COMMERCIAL

## 2020-08-07 LAB — INTERNATIONAL NORMALIZATION RATIO, POC: 3.6

## 2020-08-07 PROCEDURE — 85610 PROTHROMBIN TIME: CPT | Performed by: PHARMACIST

## 2020-08-07 PROCEDURE — 99211 OFF/OP EST MAY X REQ PHY/QHP: CPT | Performed by: PHARMACIST

## 2020-08-07 NOTE — PROGRESS NOTES
Mr. Yenny Garcia has hx of multiple PE s/p 11/2009 and 6/2012. 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 He presents today w/out complaint. Pt. denies any s/sx bleeding/bruising/ swelling/SOB. No missed doses. No changes in Rx/OTC/herbal medications. No major changes in diet, back to normal amount of greens. Pt denies EtOH use. INR elevated today. Pt had been off warfarin for 4 weeks in July after subdural hematoma after a fall. INR was in range last visit two weeks ago but now slightly high again        INR 3.6 is above acceptable therapeutic goal range of 2-3. Recommend to hold dose today, then decrease dose to 5mg daily except 2.5 mg Q Sun. Pt has 5 mg tablets. Will continue to monitor and check INR in 2 weeks. Dosing reminder card given with phone number, appointment date and time.    Return to clinic: 8/21 @5983  Referring provider: Dr. Jesus Amaral, PharmD 1:10 PM EDT 8/7/20

## 2020-08-21 ENCOUNTER — ANTI-COAG VISIT (OUTPATIENT)
Dept: PHARMACY | Age: 60
End: 2020-08-21
Payer: COMMERCIAL

## 2020-08-21 LAB — INR BLD: 3

## 2020-08-21 PROCEDURE — 85610 PROTHROMBIN TIME: CPT

## 2020-08-21 PROCEDURE — 99211 OFF/OP EST MAY X REQ PHY/QHP: CPT

## 2020-08-21 NOTE — PROGRESS NOTES
Mr. John Sorenson has hx of multiple PE s/p 11/2009 and 6/2012. 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 He presents today w/out complaint. Pt. denies any s/sx bleeding/bruising/ swelling/SOB. No missed doses. No changes in Rx/OTC/herbal medications. No major changes in diet, back to normal amount of greens. Pt denies EtOH use. INR 3.0 is within acceptable therapeutic goal range of 2-3. Recommend to hold dose today, then decrease dose to 5mg daily except 2.5 mg Q Sun. Pt has 5 mg tablets. Will continue to monitor and check INR in 4 weeks. Dosing reminder card given with phone number, appointment date and time. Return to clinic: 9/18 @1300  Referring provider: Dr. Tristen Ma, PharmD Candidate 8/21/20    I have seen the patient and reviewed the progress note written by the PharmD Candidate. I agree with this assesment and plan.    Laila Gibson, BernabeD 8/21/20 2:12 PM

## 2020-09-18 ENCOUNTER — ANTI-COAG VISIT (OUTPATIENT)
Dept: PHARMACY | Age: 60
End: 2020-09-18
Payer: COMMERCIAL

## 2020-09-18 LAB — INR BLD: 3

## 2020-09-18 PROCEDURE — 85610 PROTHROMBIN TIME: CPT

## 2020-09-18 PROCEDURE — 99211 OFF/OP EST MAY X REQ PHY/QHP: CPT

## 2020-09-18 NOTE — PROGRESS NOTES
Mr. Sidra Toure has hx of multiple PE s/p 11/2009 and 6/2012. 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 He presents today w/out complaint. Pt. denies any s/sx bleeding/bruising/ swelling/SOB. No missed doses. No changes in Rx/OTC/herbal medications. No major changes in diet, back to normal amount of greens. Pt denies EtOH use. INR 3.0 is within acceptable therapeutic goal range of 2-3. Recommend to continue dose of 5mg daily except 2.5 mg Q Sun. Pt has 5 mg tablets. Will continue to monitor and check INR in 4 weeks. Dosing reminder card given with phone number, appointment date and time.    Return to clinic: 10/16 @1300  Referring provider: Dr. Boris Iqbal, PharmD 9/18/20  12:55 PM

## 2020-09-23 ENCOUNTER — OFFICE VISIT (OUTPATIENT)
Dept: INTERNAL MEDICINE CLINIC | Age: 60
End: 2020-09-23
Payer: COMMERCIAL

## 2020-09-23 VITALS
HEIGHT: 77 IN | TEMPERATURE: 98.4 F | WEIGHT: 225 LBS | DIASTOLIC BLOOD PRESSURE: 80 MMHG | HEART RATE: 80 BPM | BODY MASS INDEX: 26.57 KG/M2 | OXYGEN SATURATION: 98 % | SYSTOLIC BLOOD PRESSURE: 124 MMHG

## 2020-09-23 PROCEDURE — 99213 OFFICE O/P EST LOW 20 MIN: CPT | Performed by: NURSE PRACTITIONER

## 2020-09-23 PROCEDURE — 90686 IIV4 VACC NO PRSV 0.5 ML IM: CPT | Performed by: NURSE PRACTITIONER

## 2020-09-23 PROCEDURE — 90471 IMMUNIZATION ADMIN: CPT | Performed by: NURSE PRACTITIONER

## 2020-09-23 RX ORDER — WARFARIN SODIUM 5 MG/1
TABLET ORAL
COMMUNITY
Start: 2020-08-09 | End: 2022-05-23

## 2020-09-23 ASSESSMENT — ENCOUNTER SYMPTOMS
SINUS PAIN: 0
COUGH: 0
DIARRHEA: 0
NAUSEA: 0
VOMITING: 0
SORE THROAT: 0
CONSTIPATION: 0
SHORTNESS OF BREATH: 0
CHEST TIGHTNESS: 0

## 2020-09-23 NOTE — PROGRESS NOTES
[facility]  [address]  Tel:[phone]    Leeann Anand is a 63 y.o. male who presents today for hismedical conditions/complaints as noted below. Leeann Anand is c/o of Hyperlipidemia and Gastroesophageal Reflux    Chief Complaint   Patient presents with    Hyperlipidemia    Gastroesophageal Reflux     HPI:     Dyslipidemia  Patient presents for evaluation of lipids. Compliance with treatment thus far has been excellent. A repeat fasting lipid profile was not done. The patient does not use medications that may worsen dyslipidemias (corticosteroids, progestins, anabolic steroids, diuretics, beta-blockers, amiodarone, cyclosporine, olanzapine). The patient exercises frequently. The patient is not known to have coexisting coronary artery disease. Cardiac Risk Factors  Age > 45-male, > 55-female:  YES  +1  Smoking:   NO  Sig. family hx of CHD*:  YES  +1  Hypertension:   NO  Diabetes:   NO  HDL < 35:   NO  HDL > 59:   NO  Total: 2  *- Significant family history of Coronary Heart Disease per National Cholesterol Education Program = Myocardial Infarction or sudden death at less than 54years old in   father or other 1st-degree male relative, or less than 72years old in mother or    other 1st-degree female relative. Overall improving from recent fall with resultant SDH. Does state he has occasional remnant headaches when he over works or is extremely tired and goes without sleep. Denies neurological changes, headaches waking him from sleep, 'worst headache of my life' symptoms. INR recently stable for PE prophylaxis. Continuing with coumadin clinic for monitoring. Subjective:     Review of Systems   Constitutional: Negative for fever. HENT: Negative for sinus pain and sore throat. Respiratory: Negative for cough, chest tightness and shortness of breath.     Cardiovascular: Negative for chest pain and palpitations. Gastrointestinal: Negative for constipation, diarrhea, nausea and vomiting. Genitourinary: Negative for dysuria and urgency. Skin: Negative for rash. Neurological: Negative for dizziness and weakness. Objective:     Vitals:    09/23/20 1401   BP: 124/80   Site: Left Upper Arm   Position: Sitting   Cuff Size: Medium Adult   Pulse: 80   Temp: 98.4 °F (36.9 °C)   TempSrc: Infrared   SpO2: 98%   Weight: 225 lb (102.1 kg)   Height: 6' 5\" (1.956 m)       Physical Exam  Vitals signs and nursing note reviewed. Constitutional:       Appearance: Normal appearance. He is well-developed. HENT:      Head: Normocephalic and atraumatic. Comments: Headache described as frontal, squeezing type symptoms. No acute changes     Right Ear: Hearing and external ear normal.      Left Ear: Hearing and external ear normal.      Nose: Nose normal.   Eyes:      General: Lids are normal.      Pupils: Pupils are equal, round, and reactive to light. Neck:      Musculoskeletal: Normal range of motion. Cardiovascular:      Rate and Rhythm: Normal rate and regular rhythm. No extrasystoles are present. Chest Wall: PMI is not displaced. Pulses: Normal pulses. Heart sounds: Normal heart sounds, S1 normal and S2 normal. Heart sounds not distant. No murmur. No systolic murmur. No diastolic murmur. No friction rub. No gallop. No S3 or S4 sounds. Pulmonary:      Effort: Pulmonary effort is normal. No accessory muscle usage or respiratory distress. Breath sounds: Normal breath sounds. No decreased breath sounds, wheezing, rhonchi or rales. Abdominal:      General: Bowel sounds are normal.      Palpations: Abdomen is soft. Skin:     General: Skin is warm and dry. Neurological:      General: No focal deficit present. Mental Status: He is alert. Psychiatric:         Speech: Speech normal.         Assessment & Plan:    The following diagnoses and conditions are stable with no further orders unlessindicated:    1. Need for influenza vaccination    2. Mixed hyperlipidemia    3. Personal history of DVT (deep vein thrombosis)    4. Pulmonary embolism, other, unspecified chronicity, unspecified whether acute cor pulmonale present ([address]    5. Subdural hematoma (HCC)        Valerie Landry was seen today for hyperlipidemia and gastroesophageal reflux. Diagnoses and all orders for this visit:    Need for influenza vaccination  -     INFLUENZA, QUADV, 3 YRS AND OLDER, IM PF, PREFILL SYR OR SDV, 0.5ML (Duane Darter, PF)    Mixed hyperlipidemia    Encouraged obtaining new fasting screening to determine control. Encouraged decreasing fatty, cholesterol rich foods in the diet (I.e. Red meats, butter, whole fat milk). Dietary choices like olive oil instead of butter, baked foods instead of fried can help to further prevent future elevations. Foods high in omega fatty acids can help to further decrease lipids additionally. Emphasis placed on incorporating fish, lean proteins (chicken, turkey, pork), fresh fruits and vegetables. Personal history of DVT (deep vein thrombosis)  Pulmonary embolism, other, unspecified chronicity, unspecified whether acute cor pulmonale present (HCC)    Stable on warfarin. Continue current regimen set forth by coumadin clinic. Subdural hematoma (HCC)    No further bleeding. Headache symptoms likely remnant of continued healing, post concussive syndrome. Continue to monitor for the time being. Tylenol for when headaches are present. Consider management of sinus symptoms as well. No follow-ups on file. Patient should call the office immediately with new or ongoing signs or symptoms or worsening, or proceed to the emergency room. If you are onmedications which could impair your senses, you are at risk of weakness, falls, dizziness, or drowsiness.   You should be careful during activities which could place you at risk of harm, such as climbing, using stairs,operating machinery, or driving vehicles. If you feel you cannot safely do these activities, you should request others to help you, or avoid the activities altogether. If you are drowsy for any other reason, you should usethe same precautions as listed above. Call if pattern of symptoms change or persists for an extended time.     Laila Rice, FNP-C

## 2020-10-05 NOTE — PROGRESS NOTES
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 The following were examined and determined to be abnormal: Head/face, Breast/axilla/chest, Abdomen, Back, RUE, LUE, RLE and LLE. -General: Well-appearing, NAD  1. Scattered on the trunk and extremities are multiple well-defined round and oval symmetric smoothly-bordered uniformly brown macules and papules. 2. R antihelix 1, R 3 and L 2 temples, R nasal bridge 1, L nasal sidewall 1, R distal anterior thigh 1 - ill-defined irregularly-shaped roughly-scaling thin pink macule(s)/papule(s)     Assessment and Plan     1. Benign acquired melanocytic nevi  -Recommend monthly self skin exams   -Educated regarding the ABCDEs of melanoma detection   -Call for any new/changing moles or concerning lesions  -Reviewed sun protective behavior -- sun avoidance during the peak hours of the day, sun-protective clothing (including hat and sunglasses), sunscreen use (water resistant, broad spectrum, SPF at least 30, need for reapplication every 2 to 3 hours), avoidance of tanning beds   -Return for full skin exam in 1 year (sooner if indicated)     2.  Actinic keratosis(es)  -Edu re: relationship with chronic cumulative sun exposure, low premalignant potential.   -9 lesion(s) treated w/ liquid nitrogen x 2 cycles - R antihelix 1, R 3 and L 2 temples, R nasal bridge 1, L nasal sidewall 1, R distal anterior thigh 1. Edu re: risk of blister formation, discomfort, scar, hypopigmentation. Discussed wound care.

## 2020-10-07 ENCOUNTER — OFFICE VISIT (OUTPATIENT)
Dept: DERMATOLOGY | Age: 60
End: 2020-10-07
Payer: COMMERCIAL

## 2020-10-07 VITALS — TEMPERATURE: 97.5 F

## 2020-10-07 PROCEDURE — 17000 DESTRUCT PREMALG LESION: CPT | Performed by: DERMATOLOGY

## 2020-10-07 PROCEDURE — 17003 DESTRUCT PREMALG LES 2-14: CPT | Performed by: DERMATOLOGY

## 2020-10-07 PROCEDURE — 99213 OFFICE O/P EST LOW 20 MIN: CPT | Performed by: DERMATOLOGY

## 2020-10-16 ENCOUNTER — ANTI-COAG VISIT (OUTPATIENT)
Dept: PHARMACY | Age: 60
End: 2020-10-16
Payer: COMMERCIAL

## 2020-10-16 LAB — INR BLD: 2.6

## 2020-10-16 PROCEDURE — 85610 PROTHROMBIN TIME: CPT

## 2020-10-16 PROCEDURE — 99211 OFF/OP EST MAY X REQ PHY/QHP: CPT

## 2020-10-16 NOTE — PROGRESS NOTES
Mr. Evangelina Almanzar has hx of multiple PE s/p 11/2009 and 6/2012. 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 He presents today w/out complaint. Pt. denies any s/sx bleeding/bruising/ swelling/SOB. No missed doses. No changes in Rx/OTC/herbal medications. Pt denies EtOH use. Pt reports having more nose bleeds which do not last long. No changes in medications, health, or diet. INR 2.6 is within acceptable therapeutic goal range of 2-3. Recommend to continue dose of 5mg daily except 2.5 mg Q Sun. Pt has 5 mg tablets. Will continue to monitor and check INR in 4 weeks. Dosing reminder card given with phone number, appointment date and time.    Return to clinic: 11/12 @1:45 pm  Referring provider: Dr. Torres Bennett PharmD Candidate 2021    I have seen the patient and I agree with the assessment and plan created by the PharmD Candidate  Tiffani Wagner PharmD 10/16/2020 1:18 PM

## 2020-11-12 ENCOUNTER — ANTI-COAG VISIT (OUTPATIENT)
Dept: PHARMACY | Age: 60
End: 2020-11-12
Payer: COMMERCIAL

## 2020-11-12 LAB — INR BLD: 2.3

## 2020-11-12 PROCEDURE — 99211 OFF/OP EST MAY X REQ PHY/QHP: CPT

## 2020-11-12 PROCEDURE — 85610 PROTHROMBIN TIME: CPT

## 2020-11-12 NOTE — PROGRESS NOTES
Mr. Jobie Spatz has hx of multiple PE s/p 11/2009 and 6/2012. 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 He presents today w/out complaint. Pt. denies any s/sx bleeding/bruising/ swelling/SOB. No missed doses. No changes in Rx/OTC/herbal medications. Pt denies EtOH use. INR 2.3 is within acceptable therapeutic goal range of 2-3. Recommend to continue dose of 5mg daily except 2.5 mg Q Sun. Pt has 5 mg tablets. Will continue to monitor and check INR in 4 weeks. Dosing reminder card given with phone number, appointment date and time.    Return to clinic: 12/10 @ 2 pm  Referring provider: Dr. Torey George, PharmD 11/12/20  1:39 PM

## 2020-12-10 ENCOUNTER — ANTI-COAG VISIT (OUTPATIENT)
Dept: PHARMACY | Age: 60
End: 2020-12-10
Payer: COMMERCIAL

## 2020-12-10 LAB — INR BLD: 1.8

## 2020-12-10 PROCEDURE — 85610 PROTHROMBIN TIME: CPT

## 2020-12-10 PROCEDURE — 99211 OFF/OP EST MAY X REQ PHY/QHP: CPT

## 2020-12-10 NOTE — PROGRESS NOTES
Mr. Martin Rater has hx of multiple PE s/p 11/2009 and 6/2012. 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 He presents today w/out complaint. Pt. denies any s/sx bleeding/bruising/ swelling/SOB. No missed doses. No changes in Rx/OTC/herbal medications. Pt denies EtOH use. Reports eating more broccoli lately than usual. Also missed a dose of warfarin ~2 weeks ago. INR 1.8 is within acceptable therapeutic goal range of 2-3. Recommend to take 7.5 mg today only then continue dose of 5mg daily except 2.5 mg Q Sun. Pt has 5 mg tablets. Will continue to monitor and check INR in 4 weeks. Dosing reminder card given with phone number, appointment date and time.    Return to clinic: 1/7 @ 2 pm  Referring provider: Dr. Farida Alexandra, PharmD 12/10/20  2:00 PM

## 2021-01-05 ENCOUNTER — OFFICE VISIT (OUTPATIENT)
Dept: PRIMARY CARE CLINIC | Age: 61
End: 2021-01-05
Payer: COMMERCIAL

## 2021-01-05 DIAGNOSIS — Z11.59 SCREENING FOR VIRAL DISEASE: Primary | ICD-10-CM

## 2021-01-05 PROCEDURE — 99211 OFF/OP EST MAY X REQ PHY/QHP: CPT | Performed by: NURSE PRACTITIONER

## 2021-01-05 NOTE — PROGRESS NOTES
Mati Veras received a viral test for COVID-19. They were educated on isolation and quarantine as appropriate. For any symptoms, they were directed to seek care from their PCP, given contact information to establish with a doctor, directed to an urgent care or the emergency room.

## 2021-01-05 NOTE — PATIENT INSTRUCTIONS

## 2021-01-06 LAB — SARS-COV-2, NAA: DETECTED

## 2021-01-25 ENCOUNTER — OFFICE VISIT (OUTPATIENT)
Dept: INTERNAL MEDICINE CLINIC | Age: 61
End: 2021-01-25
Payer: COMMERCIAL

## 2021-01-25 VITALS
HEIGHT: 77 IN | TEMPERATURE: 97.2 F | WEIGHT: 212 LBS | SYSTOLIC BLOOD PRESSURE: 110 MMHG | DIASTOLIC BLOOD PRESSURE: 72 MMHG | BODY MASS INDEX: 25.03 KG/M2 | OXYGEN SATURATION: 98 % | HEART RATE: 93 BPM

## 2021-01-25 DIAGNOSIS — Z00.00 WELL ADULT EXAM: Primary | ICD-10-CM

## 2021-01-25 DIAGNOSIS — I26.99 PULMONARY EMBOLISM, OTHER, UNSPECIFIED CHRONICITY, UNSPECIFIED WHETHER ACUTE COR PULMONALE PRESENT (HCC): ICD-10-CM

## 2021-01-25 DIAGNOSIS — E78.2 MIXED HYPERLIPIDEMIA: ICD-10-CM

## 2021-01-25 PROCEDURE — 99213 OFFICE O/P EST LOW 20 MIN: CPT | Performed by: NURSE PRACTITIONER

## 2021-01-25 ASSESSMENT — ENCOUNTER SYMPTOMS
CHEST TIGHTNESS: 0
DIARRHEA: 0
COUGH: 0
VOMITING: 0
SINUS PAIN: 0
NAUSEA: 0
CONSTIPATION: 0
SORE THROAT: 0
SHORTNESS OF BREATH: 0

## 2021-01-25 ASSESSMENT — PATIENT HEALTH QUESTIONNAIRE - PHQ9
SUM OF ALL RESPONSES TO PHQ QUESTIONS 1-9: 0
SUM OF ALL RESPONSES TO PHQ9 QUESTIONS 1 & 2: 0
1. LITTLE INTEREST OR PLEASURE IN DOING THINGS: 0

## 2021-01-25 NOTE — PROGRESS NOTES
[facility]  [address]  Amber Valencia is a 57 y.o. male who presents today for his medical conditions/complaints as noted below. Alondra Eubanks is c/o of Annual Exam      Chief Complaint   Patient presents with    Annual Exam       HPI:     Mr. Kely Allison presents for his annual well visit. He states he is overall well. Recently recovered from [employer] REVA after exposure with his wife. Denies remnant symptoms. Continues to work with the [employer] [address]. Enjoys his job. Anticoags dosing stable from previously. Continues his dose daily. Tries to stay away from greens/vit k sources. Denies excessive bleeding. Dyslipidemia  Patient presents for evaluation of lipids. Compliance with treatment thus far has been excellent. A repeat fasting lipid profile was not done. The patient does not use medications that may worsen dyslipidemias (corticosteroids, progestins, anabolic steroids, diuretics, beta-blockers, amiodarone, cyclosporine, olanzapine). The patient exercises frequently.     The patient is not known to have coexisting coronary artery disease.      Cardiac Risk Factors  Age > 45-male, > 55-female:   YES  +1  Smoking:          NO  Sig. family hx of CHD*:            YES  +1  Hypertension:   NO  Diabetes:          NO  HDL < 35:         NO  HDL > 59:         NO  Total:   2  *- Significant family history of Coronary Heart Disease per National Cholesterol Education Program = Myocardial Infarction or sudden death at less than 54years old in   father or other 1st-degree male relative, or less than 72years old in mother or    other 1st-degree female relative.       Past Medical History:   Diagnosis Date    Asthma     Cancer ([facility] [address]     skin    Deep vein thrombosis (DVT) (HCC) in legs and traveled to lungs    FH: CAD (coronary artery disease) 1/8/2014    Former smoker 1/8/2014    GERD (gastroesophageal reflux disease)     High cholesterol     Hx of blood clots     Lung mass     Lung nodule 6/3/2012    histoplasmosis    S/P thoracotomy 2012    Right thoracotomy, RUL excisional biopsy of nodule with FS, 5 level intercostal nerve block             Past Surgical History:   Procedure Laterality Date    APPENDECTOMY      1996    COLONOSCOPY      COLONOSCOPY N/A 2019    COLONOSCOPY performed by Cristy Fitzgerald MD at [address]    WNL  - no stents    ENDOSCOPY, COLON, DIAGNOSTIC      EYE SURGERY      laser surgery     FOOT SURGERY Right     bone spur    KNEE ARTHROSCOPY Left     X 2    THORACOTOMY  2012    Right thoracotomy, right upper lobe excisional biopsy with frozen section 5 level intercostal nerve block    TONSILLECTOMY         Family History   Problem Relation Age of Onset    Diabetes Mother     Parkinsonism Mother     Cancer Mother         MASTECTOMY, BREAST CA, SPREAD    Heart Disease Father         CHF    Heart Disease Brother         CABG 3 VESSELLS    Other Brother         CHROHN'S DISEASE       Social History     Tobacco Use    Smoking status: Former Smoker     Packs/day: 1.00     Years: 25.00     Pack years: 25.00     Types: Cigarettes     Quit date: 2000     Years since quittin.6    Smokeless tobacco: Never Used   Substance Use Topics    Alcohol use: No        Current Outpatient Medications   Medication Sig Dispense Refill    warfarin (COUMADIN) 5 MG tablet       atorvastatin (LIPITOR) 40 MG tablet TAKE ONE TABLET BY MOUTH NIGHTLY 90 tablet 3    omeprazole (PRILOSEC) 10 MG delayed release capsule Take 10 mg by mouth daily       No current facility-administered medications for this visit.         Allergies   Allergen Reactions    Bee Venom Anaphylaxis     Has epi pen    Nutritional Supplements Anaphylaxis     Pt states he is not allergic to nutritional supplements    Penicillins Hives Subjective:      Review of Systems   Constitutional: Negative for fever. HENT: Negative for sinus pain and sore throat. Respiratory: Negative for cough, chest tightness and shortness of breath. Cardiovascular: Negative for chest pain and palpitations. Gastrointestinal: Negative for constipation, diarrhea, nausea and vomiting. Genitourinary: Negative for dysuria and urgency. Skin: Negative for rash. Neurological: Negative for dizziness and weakness. Objective:     Vitals:    01/25/21 1106   BP: 110/72   Site: Left Upper Arm   Position: Sitting   Cuff Size: Medium Adult   Pulse: 93   Temp: 97.2 °F (36.2 °C)   TempSrc: Infrared   SpO2: 98%   Weight: 212 lb (96.2 kg)   Height: 6' 5\" (1.956 m)       Physical Exam  Constitutional:       Appearance: Normal appearance. He is well-developed. HENT:      Head: Normocephalic. Right Ear: Hearing and external ear normal.      Left Ear: Hearing and external ear normal.      Nose: Nose normal.   Eyes:      General: Lids are normal. Lids are everted, no foreign bodies appreciated. Conjunctiva/sclera: Conjunctivae normal.      Pupils: Pupils are equal, round, and reactive to light. Neck:      Musculoskeletal: Full passive range of motion without pain, normal range of motion and neck supple. Thyroid: No thyroid mass. Vascular: Normal carotid pulses. No carotid bruit or JVD. Cardiovascular:      Rate and Rhythm: Normal rate and regular rhythm. No extrasystoles are present. Chest Wall: PMI is not displaced. Pulses:           Radial pulses are 2+ on the right side and 2+ on the left side. Dorsalis pedis pulses are 2+ on the right side and 2+ on the left side. Heart sounds: Normal heart sounds, S1 normal and S2 normal. Heart sounds not distant. No murmur. No friction rub. No gallop. No S3 or S4 sounds. Pulmonary:      Effort: Pulmonary effort is normal. No respiratory distress.       Breath sounds: Normal breath sounds. No decreased breath sounds, wheezing, rhonchi or rales. Abdominal:      General: Bowel sounds are normal. There is no distension. Palpations: Abdomen is soft. Tenderness: There is no abdominal tenderness. There is no rebound. Musculoskeletal: Normal range of motion. Skin:     General: Skin is warm and dry. Neurological:      Cranial Nerves: No cranial nerve deficit. Psychiatric:         Speech: Speech normal.         Behavior: Behavior normal.         Thought Content: Thought content normal.         Judgment: Judgment normal.         Assessment & Plan: The following diagnoses and conditions are stable with no further orders unless indicated:    1. Well adult exam    2. Pulmonary embolism, other, unspecified chronicity, unspecified whether acute cor pulmonale present ([facility] [address]    3. Mixed hyperlipidemia        Daly Luo was seen today for annual exam.    Diagnoses and all orders for this visit:    Well adult exam  -     CBC Auto Differential; Future  -     Comprehensive Metabolic Panel; Future  -     Lipid Panel; Future  -     Hemoglobin A1C; Future    Discussed healthy lifestyle habits at length (encouraged regular exercise, healthy diet, no smoking, adequate sleep, sunscreen, safety items (car/driving, illness prevention.)    Pulmonary embolism, other, unspecified chronicity, unspecified whether acute cor pulmonale present (HCC)    Stable on current dose. Plans to have rechecked at coumadin clinic shortly. Mixed hyperlipidemia    Recheck soon. Encouraged decreasing fatty, cholesterol rich foods in the diet (I.e. Red meats, butter, whole fat milk). Dietary choices like olive oil instead of butter, baked foods instead of fried can help to further prevent future elevations. Foods high in omega fatty acids can help to further decrease lipids additionally. Emphasis placed on incorporating fish, lean proteins (chicken, turkey, pork), fresh fruits and vegetables.        Return in about 6 months

## 2021-01-29 ENCOUNTER — ANTI-COAG VISIT (OUTPATIENT)
Dept: PHARMACY | Age: 61
End: 2021-01-29
Payer: COMMERCIAL

## 2021-01-29 DIAGNOSIS — Z79.01 LONG TERM CURRENT USE OF ANTICOAGULANT: ICD-10-CM

## 2021-01-29 LAB — INR BLD: 3.1

## 2021-01-29 PROCEDURE — 99211 OFF/OP EST MAY X REQ PHY/QHP: CPT

## 2021-01-29 PROCEDURE — 85610 PROTHROMBIN TIME: CPT

## 2021-01-29 NOTE — PROGRESS NOTES
Mr. Taya Rivera has hx of multiple PE s/p 11/2009 and 6/2012. 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 He presents today w/out complaint. Pt. denies any s/sx bleeding/bruising/ swelling/SOB. No missed doses. No changes in Rx/OTC/herbal medications. Pt denies EtOH use. INR 3.1 is within acceptable therapeutic goal range of 2-3. Recommend to continue dose of 5mg daily except 2.5 mg Q Sun. Pt has 5 mg tablets. Will continue to monitor and check INR in 4 weeks. Dosing reminder card given with phone number, appointment date and time.    Return to clinic: 2/25 @ [phone]  Referring provider: Dr. Beti Smith

## 2021-02-09 DIAGNOSIS — E78.2 MIXED HYPERLIPIDEMIA: ICD-10-CM

## 2021-02-10 RX ORDER — ATORVASTATIN CALCIUM 40 MG/1
TABLET, FILM COATED ORAL
Qty: 90 TABLET | Refills: 3 | Status: SHIPPED | OUTPATIENT
Start: 2021-02-10 | End: 2022-03-07

## 2021-02-10 NOTE — TELEPHONE ENCOUNTER
Refill request for atorvastatin medication.      Name of [place] visit - 1/25/2021     Pending visit - None    Last refill -2/24/2020  3 refills

## 2021-02-25 ENCOUNTER — ANTI-COAG VISIT (OUTPATIENT)
Dept: PHARMACY | Age: 61
End: 2021-02-25
Payer: COMMERCIAL

## 2021-02-25 DIAGNOSIS — Z79.01 LONG TERM CURRENT USE OF ANTICOAGULANT: ICD-10-CM

## 2021-02-25 LAB — INTERNATIONAL NORMALIZATION RATIO, POC: 1.9

## 2021-02-25 PROCEDURE — 85610 PROTHROMBIN TIME: CPT

## 2021-02-25 PROCEDURE — 99211 OFF/OP EST MAY X REQ PHY/QHP: CPT

## 2021-02-25 NOTE — PROGRESS NOTES
Mr. Claudia Vega has hx of multiple PE s/p 11/2009 and 6/2012. 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 He presents today w/out complaint. Pt. denies any s/sx bleeding/bruising/ swelling/SOB. No missed doses. No changes in Rx/OTC/herbal medications. Pt denies EtOH use. Had some spinach dip and other vegetable, would explained slightly lower INR today. INR 1.9 is slightly below acceptable therapeutic goal range of 2-3. Recommend to continue dose of 5mg daily except 2.5 mg Q Sun. Pt has 5 mg tablets. Will continue to monitor and check INR in 4 weeks. Dosing reminder card given with phone number, appointment date and time.    Return to clinic: 3/25 @ [phone]  Referring provider: Dr. Wright Parkinson PharmD  2/25/2021 at 1:21 PM

## 2021-03-01 ENCOUNTER — HOSPITAL ENCOUNTER (OUTPATIENT)
Age: 61
Discharge: HOME | End: 2021-03-01
Payer: COMMERCIAL

## 2021-03-01 DIAGNOSIS — Z00.00 WELL ADULT EXAM: ICD-10-CM

## 2021-03-01 LAB
A/G RATIO: 1.3 (ref 1.1–2.2)
ALBUMIN SERPL-MCNC: 4.4 G/DL (ref 3.4–5)
ALP BLD-CCNC: 65 U/L (ref 40–129)
ALT SERPL-CCNC: 26 U/L (ref 10–40)
ANION GAP SERPL CALCULATED.3IONS-SCNC: 12 MMOL/L (ref 3–16)
AST SERPL-CCNC: 23 U/L (ref 15–37)
BASOPHILS ABSOLUTE: 0 K/UL (ref 0–0.2)
BASOPHILS RELATIVE PERCENT: 0.9 %
BILIRUB SERPL-MCNC: 0.7 MG/DL (ref 0–1)
BUN BLDV-MCNC: 9 MG/DL (ref 7–20)
CALCIUM SERPL-MCNC: 9.5 MG/DL (ref 8.3–10.6)
CHLORIDE BLD-SCNC: 107 MMOL/L (ref 99–110)
CHOLESTEROL, TOTAL: 153 MG/DL (ref 0–199)
CO2: 27 MMOL/L (ref 21–32)
CREAT SERPL-MCNC: 0.7 MG/DL (ref 0.8–1.3)
EOSINOPHILS ABSOLUTE: 0.2 K/UL (ref 0–0.6)
EOSINOPHILS RELATIVE PERCENT: 4.1 %
GFR AFRICAN AMERICAN: >60
GFR NON-AFRICAN AMERICAN: >60
GLOBULIN: 3.4 G/DL
GLUCOSE BLD-MCNC: 74 MG/DL (ref 70–99)
HCT VFR BLD CALC: 46.2 % (ref 40.5–52.5)
HDLC SERPL-MCNC: 37 MG/DL (ref 40–60)
HEMOGLOBIN: 15.4 G/DL (ref 13.5–17.5)
LDL CHOLESTEROL CALCULATED: 84 MG/DL
LYMPHOCYTES ABSOLUTE: 1.6 K/UL (ref 1–5.1)
LYMPHOCYTES RELATIVE PERCENT: 32.4 %
MCH RBC QN AUTO: 30.9 PG (ref 26–34)
MCHC RBC AUTO-ENTMCNC: 33.2 G/DL (ref 31–36)
MCV RBC AUTO: 93.1 FL (ref 80–100)
MONOCYTES ABSOLUTE: 0.4 K/UL (ref 0–1.3)
MONOCYTES RELATIVE PERCENT: 7.5 %
NEUTROPHILS ABSOLUTE: 2.7 K/UL (ref 1.7–7.7)
NEUTROPHILS RELATIVE PERCENT: 55.1 %
PDW BLD-RTO: 13.8 % (ref 12.4–15.4)
PLATELET # BLD: 228 K/UL (ref 135–450)
PMV BLD AUTO: 9 FL (ref 5–10.5)
POTASSIUM SERPL-SCNC: 3.9 MMOL/L (ref 3.5–5.1)
RBC # BLD: 4.97 M/UL (ref 4.2–5.9)
SODIUM BLD-SCNC: 146 MMOL/L (ref 136–145)
TOTAL PROTEIN: 7.8 G/DL (ref 6.4–8.2)
TRIGL SERPL-MCNC: 160 MG/DL (ref 0–150)
VLDLC SERPL CALC-MCNC: 32 MG/DL
WBC # BLD: 5 K/UL (ref 4–11)

## 2021-03-01 PROCEDURE — 80061 LIPID PANEL: CPT

## 2021-03-01 PROCEDURE — 83036 HEMOGLOBIN GLYCOSYLATED A1C: CPT

## 2021-03-01 PROCEDURE — 36415 COLL VENOUS BLD VENIPUNCTURE: CPT

## 2021-03-01 PROCEDURE — 85025 COMPLETE CBC W/AUTO DIFF WBC: CPT

## 2021-03-01 PROCEDURE — 80053 COMPREHEN METABOLIC PANEL: CPT

## 2021-03-02 LAB
ESTIMATED AVERAGE GLUCOSE: 102.5 MG/DL
HBA1C MFR BLD: 5.2 %

## 2021-03-18 ENCOUNTER — IMMUNIZATION (OUTPATIENT)
Dept: FAMILY MEDICINE CLINIC | Age: 61
End: 2021-03-18
Payer: COMMERCIAL

## 2021-03-18 PROCEDURE — 91300 COVID-19, PFIZER VACCINE 30MCG/0.3ML DOSE: CPT | Performed by: NURSE PRACTITIONER

## 2021-03-18 PROCEDURE — 0001A COVID-19, PFIZER VACCINE 30MCG/0.3ML DOSE: CPT | Performed by: NURSE PRACTITIONER

## 2021-03-25 ENCOUNTER — ANTI-COAG VISIT (OUTPATIENT)
Dept: PHARMACY | Age: 61
End: 2021-03-25
Payer: COMMERCIAL

## 2021-03-25 DIAGNOSIS — Z79.01 LONG TERM CURRENT USE OF ANTICOAGULANT: ICD-10-CM

## 2021-03-25 LAB — INR BLD: 2

## 2021-03-25 PROCEDURE — 85610 PROTHROMBIN TIME: CPT | Performed by: FAMILY MEDICINE

## 2021-03-25 PROCEDURE — 99211 OFF/OP EST MAY X REQ PHY/QHP: CPT | Performed by: FAMILY MEDICINE

## 2021-03-25 NOTE — PROGRESS NOTES
Mr. Magalie Brandon has hx of multiple PE s/p 11/2009 and 6/2012. 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 He presents today w/out complaint. Pt. denies any s/sx bleeding/bruising/ swelling/SOB. No missed doses. No changes in Rx/OTC/herbal medications. Pt denies EtOH use. Had some spinach dip and other vegetable, would explained slightly lower INR today. INR 2.0 is within acceptable therapeutic goal range of 2-3. Recommend to continue dose of 5mg daily except 2.5 mg Q Sun. Pt has 5 mg tablets. Will continue to monitor and check INR in 4 weeks. Dosing reminder card given with phone number, appointment date and time.    Return to clinic: 4/22 @ 115 pm   Referring provider: Dr. Mila Buckley, PharmD 3/25/2021 1:13 PM

## 2021-04-08 ENCOUNTER — IMMUNIZATION (OUTPATIENT)
Dept: FAMILY MEDICINE CLINIC | Age: 61
End: 2021-04-08
Payer: COMMERCIAL

## 2021-04-08 PROCEDURE — 91300 COVID-19, PFIZER VACCINE 30MCG/0.3ML DOSE: CPT | Performed by: FAMILY MEDICINE

## 2021-04-08 PROCEDURE — 0002A COVID-19, PFIZER VACCINE 30MCG/0.3ML DOSE: CPT | Performed by: FAMILY MEDICINE

## 2021-04-22 ENCOUNTER — ANTI-COAG VISIT (OUTPATIENT)
Dept: PHARMACY | Age: 61
End: 2021-04-22
Payer: COMMERCIAL

## 2021-04-22 LAB — INTERNATIONAL NORMALIZATION RATIO, POC: 2

## 2021-04-22 PROCEDURE — 99211 OFF/OP EST MAY X REQ PHY/QHP: CPT

## 2021-04-22 PROCEDURE — 85610 PROTHROMBIN TIME: CPT

## 2021-04-22 NOTE — PROGRESS NOTES
Mr. Liliana Sherwood has hx of multiple PE s/p 11/2009 and 6/2012. 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 He presents today w/out complaint. Pt. denies any s/sx bleeding/bruising/ swelling/SOB. No missed doses. No changes in Rx/OTC/herbal medications. Pt denies EtOH use. INR 2.0 is within acceptable therapeutic goal range of 2-3. Recommend to continue dose of 5mg daily except 2.5 mg Q Sun. Pt has 5 mg tablets. Will continue to monitor and check INR in 4 weeks. Dosing reminder card given with phone number, appointment date and time.    Return to clinic: 5/20 @ 115 pm   Referring provider: Dr. Annalise Weber PharmD  4/22/2021 at 1:15 PM

## 2021-05-20 ENCOUNTER — ANTI-COAG VISIT (OUTPATIENT)
Dept: PHARMACY | Age: 61
End: 2021-05-20
Payer: COMMERCIAL

## 2021-05-20 DIAGNOSIS — Z79.01 LONG TERM CURRENT USE OF ANTICOAGULANT: Primary | ICD-10-CM

## 2021-05-20 LAB — INR BLD: 1.8

## 2021-05-20 PROCEDURE — 99211 OFF/OP EST MAY X REQ PHY/QHP: CPT | Performed by: FAMILY MEDICINE

## 2021-05-20 PROCEDURE — 85610 PROTHROMBIN TIME: CPT | Performed by: FAMILY MEDICINE

## 2021-05-20 NOTE — PROGRESS NOTES
Mr. Edwige Corbett has hx of multiple PE s/p 11/2009 and 6/2012. 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 He presents today w/out complaint. Pt. denies any s/sx bleeding/bruising/ swelling/SOB. No missed doses. No changes in Rx/OTC/herbal medications. Pt denies EtOH use. Pt reported no changes. INR 1.8 is slightly below acceptable therapeutic goal range of 2-3. Recommend to take 7.5 mg today only, and continue dose of 5mg daily except 2.5 mg Q Sun. Pt has 5 mg tablets. Will continue to monitor and check INR in 4 weeks. Dosing reminder card given with phone number, appointment date and time.    Return to clinic: 6/17 @ 1:00 pm   Referring provider: Dr. Trino Salazar  PharmD Candidate 2022    I have seen the patient and I agree with the assessment and plan created by the PharmD Candidate  Deborah Kelly PharmD 5/20/2021 1:11 PM

## 2021-06-17 ENCOUNTER — ANTI-COAG VISIT (OUTPATIENT)
Dept: PHARMACY | Age: 61
End: 2021-06-17
Payer: COMMERCIAL

## 2021-06-17 DIAGNOSIS — I27.82 OTHER CHRONIC PULMONARY EMBOLISM WITHOUT ACUTE COR PULMONALE (HCC): ICD-10-CM

## 2021-06-17 DIAGNOSIS — Z79.01 LONG TERM CURRENT USE OF ANTICOAGULANT: Primary | ICD-10-CM

## 2021-06-17 LAB — INR BLD: 1.9

## 2021-06-17 PROCEDURE — 85610 PROTHROMBIN TIME: CPT

## 2021-06-17 PROCEDURE — 99211 OFF/OP EST MAY X REQ PHY/QHP: CPT

## 2021-06-17 NOTE — PROGRESS NOTES
Mr. Janina Foster has hx of multiple PE s/p 11/2009 and 6/2012. 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 He presents today w/out complaint. Pt. denies any s/sx bleeding/bruising/ swelling/SOB. No missed doses. No changes in Rx/OTC/herbal medications. Pt denies EtOH use. Pt reported no changes. INR 1.9 is at acceptable therapeutic goal range of 2-3  Recommend to continue dose of 5 mg daily except 2.5 mg Q Sun. Pt has 5 mg tablets  Will continue to monitor and check INR in 4 weeks. Dosing reminder card given with phone number, appointment date and time.    Return to clinic: 7/15 @ 1:00 pm   Referring provider: Dr. Hoang Crowder  PharmD Candidate [id number]

## 2021-07-14 LAB
CHOLESTEROL, TOTAL: 161 MG/DL (ref 0–199)
GLUCOSE BLD-MCNC: 109 MG/DL (ref 70–99)
HDLC SERPL-MCNC: 41 MG/DL (ref 40–60)
LDL CHOLESTEROL CALCULATED: 82 MG/DL
TRIGL SERPL-MCNC: 190 MG/DL (ref 0–150)

## 2021-07-15 ENCOUNTER — ANTI-COAG VISIT (OUTPATIENT)
Dept: PHARMACY | Age: 61
End: 2021-07-15
Payer: COMMERCIAL

## 2021-07-15 DIAGNOSIS — Z79.01 LONG TERM CURRENT USE OF ANTICOAGULANT: ICD-10-CM

## 2021-07-15 DIAGNOSIS — I26.99 OTHER ACUTE PULMONARY EMBOLISM WITHOUT ACUTE COR PULMONALE (HCC): Primary | ICD-10-CM

## 2021-07-15 LAB — INR BLD: 1.9

## 2021-07-15 PROCEDURE — 99211 OFF/OP EST MAY X REQ PHY/QHP: CPT

## 2021-07-15 PROCEDURE — 85610 PROTHROMBIN TIME: CPT

## 2021-07-15 NOTE — PROGRESS NOTES
Mr. Jack Wall has hx of multiple PE s/p 11/2009 and 6/2012. 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 He presents today w/out complaint. Pt. denies any s/sx bleeding/bruising/ swelling/SOB. No missed doses. No changes in Rx/OTC/herbal medications. Pt denies EtOH use. No changes per pt    INR 1.9 is below therapeutic goal range of 2-3  Recommend to increase dose to 5 mg daily   Pt has 5 mg tablets  Will continue to monitor and check INR in 2 weeks. Dosing reminder card given with phone number, appointment date and time.    Return to clinic: 7/29 @ 1:00 pm   Referring provider: Dr. Zachariah Luna  PharmD Candidate 2022

## 2021-07-29 ENCOUNTER — ANTI-COAG VISIT (OUTPATIENT)
Dept: PHARMACY | Age: 61
End: 2021-07-29
Payer: COMMERCIAL

## 2021-07-29 DIAGNOSIS — Z79.01 LONG TERM CURRENT USE OF ANTICOAGULANT: ICD-10-CM

## 2021-07-29 DIAGNOSIS — I26.99 PULMONARY EMBOLISM, UNSPECIFIED CHRONICITY, UNSPECIFIED PULMONARY EMBOLISM TYPE, UNSPECIFIED WHETHER ACUTE COR PULMONALE PRESENT (HCC): Primary | ICD-10-CM

## 2021-07-29 LAB — INR BLD: 2.5

## 2021-07-29 PROCEDURE — 85610 PROTHROMBIN TIME: CPT | Performed by: FAMILY MEDICINE

## 2021-07-29 PROCEDURE — 99211 OFF/OP EST MAY X REQ PHY/QHP: CPT | Performed by: FAMILY MEDICINE

## 2021-07-29 NOTE — PROGRESS NOTES
Mr. Etelvina Pierre has hx of multiple PE s/p 11/2009 and 6/2012. 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 He presents today w/out complaint. Pt. denies any s/sx bleeding/bruising/ swelling/SOB. No missed doses. No changes in Rx/OTC/herbal medications. Pt denies EtOH use. No changes per pt    INR 2.5 is within therapeutic goal range of 2-3  Recommend to continue dose of 5 mg daily   Pt has 5 mg tablets  Will continue to monitor and check INR in 4 weeks. Dosing reminder card given with phone number, appointment date and time.    Return to clinic: 8/26 @ 1:00 pm   Referring provider: Dr. Jerri Rosales  PharmD Candidate 2022    I have seen the patient and I agree with the assessment and plan created by the PharmD Candidate  Mikey Jansen PharmD 7/29/2021 1:44 PM

## 2021-08-26 ENCOUNTER — ANTI-COAG VISIT (OUTPATIENT)
Dept: PHARMACY | Age: 61
End: 2021-08-26
Payer: COMMERCIAL

## 2021-08-26 DIAGNOSIS — Z79.01 LONG TERM CURRENT USE OF ANTICOAGULANT: Primary | ICD-10-CM

## 2021-08-26 LAB — INR BLD: 1.9

## 2021-08-26 PROCEDURE — 99211 OFF/OP EST MAY X REQ PHY/QHP: CPT | Performed by: FAMILY MEDICINE

## 2021-08-26 PROCEDURE — 85610 PROTHROMBIN TIME: CPT | Performed by: FAMILY MEDICINE

## 2021-08-26 NOTE — PROGRESS NOTES
Mr. Aureliano Mccormick has hx of multiple PE s/p 11/2009 and 6/2012. 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 He presents today w/out complaint. Pt. denies any s/sx bleeding/bruising/ swelling/SOB. No missed doses. No changes in Rx/OTC/herbal medications. Pt denies EtOH use.     Pt reported eating more salads and taking a new multivitamin with VitK foods    INR 1.9 is slightly below therapeutic goal range of 2-3  Recommend to take 7.5 mg today, then increase dose to 7.5 mg on Thur and 5 mg all other days  Pt has 5 mg tablets  Will continue to monitor and check INR in 4 weeks  Dosing reminder card given with phone number, appointment date and time  Return to clinic: 9/16 @ 1:00 pm   Referring provider: Dr. Daniel Milner  PharmD Student 2022 8/26/2021 12:53 PM    I have seen the patient and I agree with the assessment and plan created by the PharmD Candidate  Geovanni Merida PharmD 8/26/2021 1:06 PM

## 2021-09-16 ENCOUNTER — ANTI-COAG VISIT (OUTPATIENT)
Dept: PHARMACY | Age: 61
End: 2021-09-16
Payer: COMMERCIAL

## 2021-09-16 DIAGNOSIS — Z79.01 LONG TERM CURRENT USE OF ANTICOAGULANT: Primary | ICD-10-CM

## 2021-09-16 LAB — INR BLD: 2.3

## 2021-09-16 PROCEDURE — 85610 PROTHROMBIN TIME: CPT

## 2021-09-16 PROCEDURE — 99211 OFF/OP EST MAY X REQ PHY/QHP: CPT

## 2021-09-16 NOTE — PROGRESS NOTES
Mr. Martin Rater has hx of multiple PE s/p 11/2009 and 6/2012. 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 He presents today w/out complaint. Pt. denies any s/sx bleeding/bruising/ swelling/SOB. No missed doses. No changes in Rx/OTC/herbal medications. Pt denies EtOH use. No diet or medication changes reported by patient today at clinic.      INR 2.3 is within therapeutic goal range of 2-3  Recommend to continue 7.5 mg on Thur and 5 mg all other days  Pt has 5 mg tablets  Will continue to monitor and check INR in 4 weeks  Dosing reminder card given with phone number, appointment date and time  Return to clinic: 10/14 @ 1:00 pm   Referring provider: Dr. Fawn Linda  PharmD Candidate, 2022 9/16/2021 12:58 PM

## 2021-10-14 ENCOUNTER — ANTI-COAG VISIT (OUTPATIENT)
Dept: PHARMACY | Age: 61
End: 2021-10-14
Payer: COMMERCIAL

## 2021-10-14 DIAGNOSIS — Z79.01 LONG TERM CURRENT USE OF ANTICOAGULANT: ICD-10-CM

## 2021-10-14 DIAGNOSIS — I26.99 PULMONARY EMBOLISM, UNSPECIFIED CHRONICITY, UNSPECIFIED PULMONARY EMBOLISM TYPE, UNSPECIFIED WHETHER ACUTE COR PULMONALE PRESENT (HCC): Primary | ICD-10-CM

## 2021-10-14 LAB — INTERNATIONAL NORMALIZATION RATIO, POC: 3

## 2021-10-14 PROCEDURE — 85610 PROTHROMBIN TIME: CPT | Performed by: INTERNAL MEDICINE

## 2021-10-14 PROCEDURE — 99211 OFF/OP EST MAY X REQ PHY/QHP: CPT | Performed by: INTERNAL MEDICINE

## 2021-11-09 ENCOUNTER — ANTI-COAG VISIT (OUTPATIENT)
Dept: PHARMACY | Age: 61
End: 2021-11-09
Payer: COMMERCIAL

## 2021-11-09 DIAGNOSIS — Z79.01 LONG TERM CURRENT USE OF ANTICOAGULANT: ICD-10-CM

## 2021-11-09 DIAGNOSIS — I26.99 PULMONARY EMBOLISM, UNSPECIFIED CHRONICITY, UNSPECIFIED PULMONARY EMBOLISM TYPE, UNSPECIFIED WHETHER ACUTE COR PULMONALE PRESENT (HCC): Primary | ICD-10-CM

## 2021-11-09 LAB — INR BLD: 2.7

## 2021-11-09 PROCEDURE — 85610 PROTHROMBIN TIME: CPT | Performed by: FAMILY MEDICINE

## 2021-11-09 PROCEDURE — 99211 OFF/OP EST MAY X REQ PHY/QHP: CPT | Performed by: FAMILY MEDICINE

## 2021-11-09 NOTE — PROGRESS NOTES
Mr. Genia Barry has hx of multiple PE s/p 11/2009 and 6/2012. 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 He presents today w/out complaint. Pt. denies any s/sx bleeding/bruising/ swelling/SOB. No missed doses. No changes in Rx/OTC/herbal medications. Pt denies EtOH use. No diet or medication changes reported by patient today at clinic.        INR 2.7 is within therapeutic goal range of 2-3  Recommend to continue 7.5 mg on Thur and 5 mg all other days  Pt has 5 mg tablets  Will continue to monitor and check INR in 4 weeks  Dosing reminder card given with phone number, appointment date and time  Return to clinic: 12/7 @ 11 am   Referring provider: Dr. Zane Estrada, PharmD 11/9/2021 10:55 AM

## 2021-12-07 ENCOUNTER — ANTI-COAG VISIT (OUTPATIENT)
Dept: PHARMACY | Age: 61
End: 2021-12-07

## 2021-12-07 ENCOUNTER — APPOINTMENT (OUTPATIENT)
Dept: PHARMACY | Age: 61
End: 2021-12-07
Payer: COMMERCIAL

## 2021-12-07 LAB — INR BLD: 2.4

## 2021-12-07 PROCEDURE — 85610 PROTHROMBIN TIME: CPT

## 2021-12-07 PROCEDURE — 99211 OFF/OP EST MAY X REQ PHY/QHP: CPT

## 2021-12-07 NOTE — PROGRESS NOTES
Mr. Anca Baumann has hx of multiple PE s/p 11/2009 and 6/2012. 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 He presents today w/out complaint. Pt. denies any s/sx bleeding/bruising/ swelling/SOB. No missed doses. No changes in Rx/OTC/herbal medications. Pt denies EtOH use. No diet or medication changes reported by patient today at clinic. INR 2.4 is within therapeutic goal range of 2-3  Recommend to continue 7.5 mg on Thur and 5 mg all other days  Pt has 5 mg tablets  Will continue to monitor and check INR in 5 weeks d/t holidays (usually 4 weeks)  Dosing reminder card given with phone number, appointment date and time  Return to clinic: 1/11 @ 11:00 am  Referring provider: Dr. Mayra Sena  PharmD Candidate 2022 12/7/2021 10:54 AM    I have seen the patient and reviewed the progress note written by the PharmD Candidate. I agree with this assesment and plan.    Bobby Coombs, [facility], PharmD 12/7/21 11:02 AM

## 2022-01-07 ENCOUNTER — APPOINTMENT (OUTPATIENT)
Dept: CT IMAGING | Age: 62
DRG: 065 | End: 2022-01-07
Payer: COMMERCIAL

## 2022-01-07 ENCOUNTER — HOSPITAL ENCOUNTER (INPATIENT)
Age: 62
LOS: 2 days | Discharge: NURSING, REHAB OR LONG-TERM CARE | DRG: 065 | End: 2022-01-09
Attending: EMERGENCY MEDICINE | Admitting: HOSPITALIST
Payer: COMMERCIAL

## 2022-01-07 ENCOUNTER — APPOINTMENT (OUTPATIENT)
Dept: GENERAL RADIOLOGY | Age: 62
DRG: 065 | End: 2022-01-07
Payer: COMMERCIAL

## 2022-01-07 DIAGNOSIS — E83.42 HYPOMAGNESEMIA: ICD-10-CM

## 2022-01-07 DIAGNOSIS — E87.6 HYPOKALEMIA: ICD-10-CM

## 2022-01-07 DIAGNOSIS — I63.9 CEREBROVASCULAR ACCIDENT (CVA), UNSPECIFIED MECHANISM (HCC): Primary | ICD-10-CM

## 2022-01-07 DIAGNOSIS — E83.51 HYPOCALCEMIA: ICD-10-CM

## 2022-01-07 DIAGNOSIS — E87.1 HYPONATREMIA: ICD-10-CM

## 2022-01-07 PROBLEM — R29.818 ACUTE FOCAL NEUROLOGICAL DEFICIT: Status: ACTIVE | Noted: 2022-01-07

## 2022-01-07 PROBLEM — G93.49 LEUKOENCEPHALOPATHY: Status: ACTIVE | Noted: 2022-01-07

## 2022-01-07 PROBLEM — Z87.891 FORMER SMOKER: Status: ACTIVE | Noted: 2022-01-07

## 2022-01-07 LAB
A/G RATIO: 1.3 (ref 1.1–2.2)
ALBUMIN SERPL-MCNC: 3.3 G/DL (ref 3.4–5)
ALP BLD-CCNC: 49 U/L (ref 40–129)
ALT SERPL-CCNC: 21 U/L (ref 10–40)
ANION GAP SERPL CALCULATED.3IONS-SCNC: 11 MMOL/L (ref 3–16)
APTT: 37.4 SEC (ref 26.2–38.6)
AST SERPL-CCNC: 15 U/L (ref 15–37)
BASOPHILS ABSOLUTE: 0 K/UL (ref 0–0.2)
BASOPHILS RELATIVE PERCENT: 0.4 %
BILIRUB SERPL-MCNC: 0.4 MG/DL (ref 0–1)
BUN BLDV-MCNC: 8 MG/DL (ref 7–20)
CALCIUM SERPL-MCNC: 7 MG/DL (ref 8.3–10.6)
CHLORIDE BLD-SCNC: 91 MMOL/L (ref 99–110)
CHLORIDE URINE RANDOM: 76 MMOL/L
CO2: 19 MMOL/L (ref 21–32)
CREAT SERPL-MCNC: <0.5 MG/DL (ref 0.8–1.3)
CREATININE URINE: 46.4 MG/DL (ref 39–259)
EKG ATRIAL RATE: 82 BPM
EKG DIAGNOSIS: NORMAL
EKG P AXIS: 30 DEGREES
EKG P-R INTERVAL: 158 MS
EKG Q-T INTERVAL: 372 MS
EKG QRS DURATION: 88 MS
EKG QTC CALCULATION (BAZETT): 434 MS
EKG R AXIS: -14 DEGREES
EKG T AXIS: 45 DEGREES
EKG VENTRICULAR RATE: 82 BPM
EOSINOPHILS ABSOLUTE: 0.1 K/UL (ref 0–0.6)
EOSINOPHILS RELATIVE PERCENT: 1.8 %
GFR AFRICAN AMERICAN: >60
GFR NON-AFRICAN AMERICAN: >60
GLUCOSE BLD-MCNC: 75 MG/DL (ref 70–99)
GLUCOSE BLD-MCNC: 90 MG/DL
GLUCOSE BLD-MCNC: 90 MG/DL (ref 70–99)
HCT VFR BLD CALC: 45.4 % (ref 40.5–52.5)
HEMOGLOBIN: 15.7 G/DL (ref 13.5–17.5)
INR BLD: 2.01 (ref 0.88–1.12)
INR BLD: 2.02 (ref 0.88–1.12)
LYMPHOCYTES ABSOLUTE: 1.6 K/UL (ref 1–5.1)
LYMPHOCYTES RELATIVE PERCENT: 26.9 %
MAGNESIUM: 1.4 MG/DL (ref 1.8–2.4)
MCH RBC QN AUTO: 31.2 PG (ref 26–34)
MCHC RBC AUTO-ENTMCNC: 34.7 G/DL (ref 31–36)
MCV RBC AUTO: 89.9 FL (ref 80–100)
MONOCYTES ABSOLUTE: 0.6 K/UL (ref 0–1.3)
MONOCYTES RELATIVE PERCENT: 10.8 %
NEUTROPHILS ABSOLUTE: 3.5 K/UL (ref 1.7–7.7)
NEUTROPHILS RELATIVE PERCENT: 60.1 %
PDW BLD-RTO: 12.7 % (ref 12.4–15.4)
PERFORMED ON: NORMAL
PLATELET # BLD: 224 K/UL (ref 135–450)
PMV BLD AUTO: 8.4 FL (ref 5–10.5)
POTASSIUM REFLEX MAGNESIUM: 3.1 MMOL/L (ref 3.5–5.1)
POTASSIUM, UR: 17.5 MMOL/L
PRO-BNP: 24 PG/ML (ref 0–124)
PROTHROMBIN TIME: 23.4 SEC (ref 9.9–12.7)
PROTHROMBIN TIME: 23.5 SEC (ref 9.9–12.7)
RBC # BLD: 5.04 M/UL (ref 4.2–5.9)
REASON FOR REJECTION: NORMAL
REJECTED TEST: NORMAL
SODIUM BLD-SCNC: 121 MMOL/L (ref 136–145)
SODIUM URINE: 88 MMOL/L
TOTAL PROTEIN: 5.9 G/DL (ref 6.4–8.2)
TROPONIN: <0.01 NG/ML
TROPONIN: <0.01 NG/ML
WBC # BLD: 5.9 K/UL (ref 4–11)

## 2022-01-07 PROCEDURE — 2580000003 HC RX 258: Performed by: HOSPITALIST

## 2022-01-07 PROCEDURE — 6360000004 HC RX CONTRAST MEDICATION: Performed by: EMERGENCY MEDICINE

## 2022-01-07 PROCEDURE — 71045 X-RAY EXAM CHEST 1 VIEW: CPT

## 2022-01-07 PROCEDURE — 6370000000 HC RX 637 (ALT 250 FOR IP): Performed by: HOSPITALIST

## 2022-01-07 PROCEDURE — 83880 ASSAY OF NATRIURETIC PEPTIDE: CPT

## 2022-01-07 PROCEDURE — 84300 ASSAY OF URINE SODIUM: CPT

## 2022-01-07 PROCEDURE — 70496 CT ANGIOGRAPHY HEAD: CPT

## 2022-01-07 PROCEDURE — 82570 ASSAY OF URINE CREATININE: CPT

## 2022-01-07 PROCEDURE — 93005 ELECTROCARDIOGRAM TRACING: CPT | Performed by: PHYSICIAN ASSISTANT

## 2022-01-07 PROCEDURE — 83935 ASSAY OF URINE OSMOLALITY: CPT

## 2022-01-07 PROCEDURE — 6360000002 HC RX W HCPCS: Performed by: HOSPITALIST

## 2022-01-07 PROCEDURE — 96365 THER/PROPH/DIAG IV INF INIT: CPT

## 2022-01-07 PROCEDURE — 80053 COMPREHEN METABOLIC PANEL: CPT

## 2022-01-07 PROCEDURE — 83735 ASSAY OF MAGNESIUM: CPT

## 2022-01-07 PROCEDURE — 99284 EMERGENCY DEPT VISIT MOD MDM: CPT

## 2022-01-07 PROCEDURE — 85025 COMPLETE CBC W/AUTO DIFF WBC: CPT

## 2022-01-07 PROCEDURE — 36415 COLL VENOUS BLD VENIPUNCTURE: CPT

## 2022-01-07 PROCEDURE — 85730 THROMBOPLASTIN TIME PARTIAL: CPT

## 2022-01-07 PROCEDURE — 85610 PROTHROMBIN TIME: CPT

## 2022-01-07 PROCEDURE — 82436 ASSAY OF URINE CHLORIDE: CPT

## 2022-01-07 PROCEDURE — 83930 ASSAY OF BLOOD OSMOLALITY: CPT

## 2022-01-07 PROCEDURE — 1200000000 HC SEMI PRIVATE

## 2022-01-07 PROCEDURE — 84484 ASSAY OF TROPONIN QUANT: CPT

## 2022-01-07 PROCEDURE — 70450 CT HEAD/BRAIN W/O DYE: CPT

## 2022-01-07 PROCEDURE — 84133 ASSAY OF URINE POTASSIUM: CPT

## 2022-01-07 PROCEDURE — 93010 ELECTROCARDIOGRAM REPORT: CPT | Performed by: INTERNAL MEDICINE

## 2022-01-07 RX ORDER — ASPIRIN 300 MG/1
300 SUPPOSITORY RECTAL DAILY
Status: DISCONTINUED | OUTPATIENT
Start: 2022-01-08 | End: 2022-01-09 | Stop reason: HOSPADM

## 2022-01-07 RX ORDER — MAGNESIUM SULFATE IN WATER 40 MG/ML
2000 INJECTION, SOLUTION INTRAVENOUS ONCE
Status: COMPLETED | OUTPATIENT
Start: 2022-01-07 | End: 2022-01-07

## 2022-01-07 RX ORDER — ASPIRIN 81 MG/1
81 TABLET ORAL DAILY
Status: DISCONTINUED | OUTPATIENT
Start: 2022-01-08 | End: 2022-01-09 | Stop reason: HOSPADM

## 2022-01-07 RX ORDER — PROMETHAZINE HYDROCHLORIDE 25 MG/1
12.5 TABLET ORAL EVERY 6 HOURS PRN
Status: DISCONTINUED | OUTPATIENT
Start: 2022-01-07 | End: 2022-01-09 | Stop reason: HOSPADM

## 2022-01-07 RX ORDER — SODIUM CHLORIDE 9 MG/ML
25 INJECTION, SOLUTION INTRAVENOUS PRN
Status: DISCONTINUED | OUTPATIENT
Start: 2022-01-07 | End: 2022-01-09 | Stop reason: HOSPADM

## 2022-01-07 RX ORDER — LABETALOL HYDROCHLORIDE 5 MG/ML
10 INJECTION, SOLUTION INTRAVENOUS EVERY 10 MIN PRN
Status: DISCONTINUED | OUTPATIENT
Start: 2022-01-07 | End: 2022-01-09 | Stop reason: HOSPADM

## 2022-01-07 RX ORDER — POTASSIUM CHLORIDE 20 MEQ/1
40 TABLET, EXTENDED RELEASE ORAL ONCE
Status: COMPLETED | OUTPATIENT
Start: 2022-01-07 | End: 2022-01-07

## 2022-01-07 RX ORDER — BISACODYL 10 MG
10 SUPPOSITORY, RECTAL RECTAL DAILY PRN
Status: DISCONTINUED | OUTPATIENT
Start: 2022-01-07 | End: 2022-01-09 | Stop reason: HOSPADM

## 2022-01-07 RX ORDER — SODIUM CHLORIDE 9 MG/ML
INJECTION, SOLUTION INTRAVENOUS CONTINUOUS
Status: ACTIVE | OUTPATIENT
Start: 2022-01-07 | End: 2022-01-08

## 2022-01-07 RX ORDER — ATORVASTATIN CALCIUM 40 MG/1
40 TABLET, FILM COATED ORAL NIGHTLY
Status: DISCONTINUED | OUTPATIENT
Start: 2022-01-07 | End: 2022-01-09 | Stop reason: HOSPADM

## 2022-01-07 RX ORDER — SODIUM CHLORIDE 0.9 % (FLUSH) 0.9 %
10 SYRINGE (ML) INJECTION PRN
Status: DISCONTINUED | OUTPATIENT
Start: 2022-01-07 | End: 2022-01-09 | Stop reason: HOSPADM

## 2022-01-07 RX ORDER — WARFARIN SODIUM 7.5 MG/1
7.5 TABLET ORAL
Status: COMPLETED | OUTPATIENT
Start: 2022-01-07 | End: 2022-01-07

## 2022-01-07 RX ORDER — SODIUM CHLORIDE 0.9 % (FLUSH) 0.9 %
10 SYRINGE (ML) INJECTION EVERY 12 HOURS SCHEDULED
Status: DISCONTINUED | OUTPATIENT
Start: 2022-01-07 | End: 2022-01-09 | Stop reason: HOSPADM

## 2022-01-07 RX ORDER — SENNA PLUS 8.6 MG/1
1 TABLET ORAL DAILY PRN
Status: DISCONTINUED | OUTPATIENT
Start: 2022-01-07 | End: 2022-01-09 | Stop reason: HOSPADM

## 2022-01-07 RX ORDER — ONDANSETRON 2 MG/ML
4 INJECTION INTRAMUSCULAR; INTRAVENOUS EVERY 6 HOURS PRN
Status: DISCONTINUED | OUTPATIENT
Start: 2022-01-07 | End: 2022-01-09 | Stop reason: HOSPADM

## 2022-01-07 RX ORDER — PANTOPRAZOLE SODIUM 40 MG/10ML
40 INJECTION, POWDER, LYOPHILIZED, FOR SOLUTION INTRAVENOUS DAILY
Status: DISCONTINUED | OUTPATIENT
Start: 2022-01-08 | End: 2022-01-09 | Stop reason: HOSPADM

## 2022-01-07 RX ADMIN — POTASSIUM CHLORIDE 40 MEQ: 1500 TABLET, EXTENDED RELEASE ORAL at 14:58

## 2022-01-07 RX ADMIN — CALCIUM GLUCONATE 2000 MG: 98 INJECTION, SOLUTION INTRAVENOUS at 16:42

## 2022-01-07 RX ADMIN — SODIUM CHLORIDE, PRESERVATIVE FREE 10 ML: 5 INJECTION INTRAVENOUS at 21:57

## 2022-01-07 RX ADMIN — WARFARIN SODIUM 7.5 MG: 7.5 TABLET ORAL at 21:57

## 2022-01-07 RX ADMIN — MAGNESIUM SULFATE HEPTAHYDRATE 2000 MG: 40 INJECTION, SOLUTION INTRAVENOUS at 14:58

## 2022-01-07 RX ADMIN — ATORVASTATIN CALCIUM 40 MG: 40 TABLET, FILM COATED ORAL at 21:57

## 2022-01-07 RX ADMIN — SODIUM CHLORIDE: 9 INJECTION, SOLUTION INTRAVENOUS at 14:58

## 2022-01-07 RX ADMIN — IOPAMIDOL 75 ML: 755 INJECTION, SOLUTION INTRAVENOUS at 13:01

## 2022-01-07 ASSESSMENT — PAIN DESCRIPTION - DESCRIPTORS
DESCRIPTORS: TINGLING
DESCRIPTORS: ACHING

## 2022-01-07 ASSESSMENT — PAIN DESCRIPTION - LOCATION
LOCATION: HEAD
LOCATION: ARM

## 2022-01-07 ASSESSMENT — PAIN DESCRIPTION - PROGRESSION: CLINICAL_PROGRESSION: NOT CHANGED

## 2022-01-07 ASSESSMENT — PAIN DESCRIPTION - ONSET: ONSET: ON-GOING

## 2022-01-07 ASSESSMENT — ENCOUNTER SYMPTOMS
COUGH: 0
DIARRHEA: 0
SHORTNESS OF BREATH: 0
EYE PAIN: 0
VOMITING: 0
NAUSEA: 1
CONSTIPATION: 0
RHINORRHEA: 0
BACK PAIN: 0
SORE THROAT: 0
ABDOMINAL PAIN: 0

## 2022-01-07 ASSESSMENT — PAIN DESCRIPTION - FREQUENCY: FREQUENCY: CONTINUOUS

## 2022-01-07 ASSESSMENT — PAIN DESCRIPTION - ORIENTATION: ORIENTATION: LEFT

## 2022-01-07 ASSESSMENT — PAIN DESCRIPTION - PAIN TYPE
TYPE: ACUTE PAIN
TYPE: ACUTE PAIN

## 2022-01-07 ASSESSMENT — PAIN SCALES - GENERAL
PAINLEVEL_OUTOF10: 6
PAINLEVEL_OUTOF10: 6

## 2022-01-07 NOTE — ED NOTES
Reassessed pts sensation on left side, upon touch pt has left tingling of the left upper arm and pressure to the left lower leg.       [place], [facility]  01/07/22 [id number]

## 2022-01-07 NOTE — ED NOTES
swallow eval completed pt tolerated oral liquid well and without distress.      [facility]  01/07/22 2405

## 2022-01-07 NOTE — H&P
HOSPITALISTS HISTORY AND PHYSICAL    1/7/2022 3:51 PM    Patient Information:  Dimitris Nava is a 57 y.o. male [id number]  PCP:  ZEESHAN Merino CNP (Tel: [phone] )    Chief complaint:    Chief Complaint   Patient presents with    Dizziness     Pt states dizziness and loss of feeling in left arm that happened at 0945. Pt now c/o tingling to left arm and nausea. History of Present Illness:  Marquis Gatica is a 57 y.o. male who presented to the ED with concerns for an acute CVA after he awakened at 6 AM this morning with various neurologic deficits. Patient reports that his symptoms started with LUE pain, which eventually progressed to numbness extending down the entire left side of his body. He also describes vertiginous symptoms with acute headache. Upon further questioning, he states that he also experienced intermittent chest pain with associated dyspepsia. Patient does have remote history of subdural hematoma and chronic leukoencephalopathy. Upon arrival to the ED EKG was obtained revealing NSR with age-indeterminate septal infarct. Head CT revealed no acute intracranial abnormality, but low-attenuation present in the left frontoparietal region suspicious of chronic leukoencephalopathy. Increased size of ventricular system may be due to interval resolution of prior subdural fluid collection. CTA revealed no flow-limiting stenosis of the major arteries and head/neck. Notable labs include: Acute hyponatremia 121, hypokalemia 3.1, magnesium 1.4, hypocalcemia 7.0, hypoalbuminemia 3.3, and INR 2.1 on Coumadin. Patient will be admitted for CVA rule out as well as repletion of his numerous electrolyte deficiencies.     History obtained from patient and review of Epic chart    REVIEW OF SYSTEMS:   Constitutional: Negative for fever,chills; positive generalized weakness  ENT: Positive for headache; denies rhinorrhea, and sore throat. Respiratory: Negative for shortness of breath, wheezing, and cough  Cardiovascular: Negative for chest pain, palpitations, peripheral edema, orthopnea or PND  Gastrointestinal: Negative for N/V/D and abdominal pain; no hematemesis, hematochezia, or melena; no anorexia  Genitourinary: Negative for dysuria, frequency, retention; no incontinence  Hematologic/Lymphatic: Negative for bleeding tendency/excessive bruising  Musculoskeletal: Negative for myalgias and arthalgias; able to ambulate without difficulty  Neurologic: Positive paresthesia left face/LUE/LLE; denies dysarthria, hemiplegia, diplopia; no seizure activity   skin: Negative for itching,rash, decubitus  Psychiatric: Negative for depression,anxiety, and agitation; no hallucinations; denies SI/HI  Endocrine: Negative for polyuria/polydipsia/polyphagia; no heat/cold intolerance    Past Medical History:   has a past medical history of Asthma, Cancer ([facility] [address] [facility] FH: CAD (coronary artery disease), Former smoker, GERD (gastroesophageal reflux disease), High cholesterol, Hx of blood clots, Lung mass, Lung nodule, and S/P thoracotomy. Past Surgical History:   has a past surgical history that includes Appendectomy; Tonsillectomy; Endoscopy, colon, diagnostic; Colonoscopy; eye surgery; thoracotomy (7-9-2012); Coronary angioplasty (05/12/14); Knee arthroscopy (Left); Foot surgery (Right); and Colonoscopy (N/A, 1/24/2019). Medications:  No current facility-administered medications on file prior to encounter. Current Outpatient Medications on File Prior to Encounter   Medication Sig Dispense Refill    atorvastatin (LIPITOR) 40 MG tablet TAKE ONE TABLET BY MOUTH NIGHTLY 90 tablet 3    warfarin (COUMADIN) 5 MG tablet       omeprazole (PRILOSEC) 10 MG delayed release capsule Take 10 mg by mouth daily         Allergies:   Allergies   Allergen Reactions    Bee Venom Anaphylaxis     Has epi pen    Nutritional Supplements Anaphylaxis     Pt states he is not allergic to nutritional supplements    Penicillins Hives        Social History:   reports that he quit smoking about 21 years ago. His smoking use included cigarettes. He has a 25.00 pack-year smoking history. He has never used smokeless tobacco. He reports that he does not drink alcohol and does not use drugs. Family History:  family history includes Cancer in his mother; Diabetes in his mother; Heart Disease in his brother and father; Other in his brother; Parkinsonism in his mother. Physical Exam:  /85   Pulse 92   Temp 98.6 °F (37 °C) (Oral)   Resp 20   Ht 6' 5\" (1.956 m)   Wt 222 lb 9.6 oz (101 kg)   SpO2 99%   BMI 26.40 kg/m²     General appearance:  Thin male resting comfortably in bed, NAD  Eyes: Sclera clear without conjunctival injection; PERRLA; EOMI  ENT: Mucous membranes moist without thrush; normal dentition  Neck: Supple without meningismus; no goiter; no carotid bruit bilaterally  Cardiovascular: Regular rhythm without ectopy; normal S1-S2 with no murmurs; no peripheral edema; no JVD  Respiratory: No tachypnea; CTAB with adequate air exchange, no wheeze, rhonchi or rales; I:E intact  Gastrointestinal: Abdomen soft, non-tender, not distended; bowel sounds normal; no masses/organomegaly appreciated  Musculoskeletal: FROM spine and extremities x4; no gross deformity  Neurology: A&O x3; cranial nerves 2-12 grossly intact; motor 5/5  BUE/BLE; no seizure activity; finger-to-nose/heel-to-shin intact; no pronator drift; positive subjective sensory deficit left face; DTRs delayed  Psychiatry: Well-groomed with good eye contact; appropriate affect; no visual/auditory hallucination  Skin: Warm, dry, normal turgor, no rash  PV: 2/4 radial and dorsalis pedis bilaterally; brisk capillary refill    Labs:  CBC:   Lab Results   Component Value Date    WBC 5.9 01/07/2022    RBC 5.04 01/07/2022    HGB 15.7 01/07/2022    HCT interpretation    Discussed case  with ED provider    Problem List  Active Problems:    Long term current use of anticoagulant    Mixed hyperlipidemia    Leukoencephalopathy    Former smoker    Acute hyponatremia    Hypomagnesemia    Acute focal neurological deficit  Resolved Problems:    * No resolved hospital problems. *        Assessment/Plan:     Acute focal neuro deficit  Admit to telemetry under CVA pathway with Q4H neuro checks scheduled overnight  Aspiration precautions and fall protocol in place  Initiate EC ASA 81 mg daily antiplatelet agent as well as high-dose statin therapy; FLP pending  PRN Labetalol scheduled for extreme BP elevation; allow permissive HTN initially to ensure watershed blood flow remains intact  ECHO scheduled for a.m. to assess for intramural thrombus/emboli potential  MRI of the head with and without contrast scheduled for a.m. 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  Patient admitted to telemetry floor with fall and seizure precautions in place; serial neuro checks overnight   Normal saline bolus administered in ED with gentle maintenance IVF to infuse overnight; strict I's and O's during stay  Serial renal panels to ensure adequate sodium repletion (while avoiding overly aggressive correction with subsequent CPM)  Serum/urine osmolality, urine creatinine and spot urine sodium to determine FENa   NEPHRO consult placed for further recommendations    History of DVT on chronic anticoagulation  INR therapeutic at 2.0 upon arrival  Continue Coumadin dosage, with pharmacy consulted for assistance    DVT prophylaxiscontinue Coumadin daily with pharmacy consult for dosage recommendations  Code statusfull code  Dietcardiac 2 g sodium with 1500 cc fluid restriction  IV accessPIV established in ED      Admit as inpatient.  I anticipate hospitalization spanning more than two midnights for investigation and treatment of the above medically necessary diagnoses. Please note that some part of this chart was generated using Dragon dictation software. Although every effort was made to ensure the accuracy of this automated transcription, some errors in transcription may have occurred inadvertently. If you may need any clarification, please do not hesitate to contact me through [facility].        Veronika Tavarez MD    1/7/2022 3:51 PM

## 2022-01-07 NOTE — ED NOTES
Wife at bedside     [facility], [facility]  01/07/22 2062

## 2022-01-07 NOTE — ED NOTES
@6763  called ED code stroke  @3085  stroke team consulted  @1499  ( N/S) returned call     Sanjay Perkins  01/07/22 [phone]

## 2022-01-07 NOTE — CONSULTS
Pharmacy Note  Pharmacy to change base solutions Consult      Pharmacy has been asked by Jonell Seip, PA-C to adjust all drips to normal saline as appropriate based on compatibility to avoid fluid shifts since D5 is osmotically active. The following intermittent IV drips/infusions have been adjusted to saline: No IVF currently ordered      Please be aware that patient may have D5W ordered as part of hypoglycemia orderset. RPh will follow daily to ensure all new IVPBs + drips are in NS. Please call with questions.     Thank you,     Abran Gonzalez, PharmD 1/7/2022  1:02 PM

## 2022-01-07 NOTE — ED PROVIDER NOTES
[facility]  ED  EMERGENCY DEPARTMENT ENCOUNTER        Pt Name: Subha Reagan  MRN: [id number]  [place] 1960  Date of evaluation: 1/7/2022  Provider: Katie Kaye PA-C  PCP: ZEESHAN Gregg CNP  Note Started: 2:28 PM EST       I have seen and evaluated this patient with my supervising physician Taniya Hwang DO. Triage CHIEF COMPLAINT       Chief Complaint   Patient presents with    Dizziness     Pt states dizziness and loss of feeling in left arm that happened at 0945. Pt now c/o tingling to left arm and nausea. HISTORY OF PRESENT ILLNESS   (Location/Symptom, Timing/Onset, Context/Setting, Quality, Duration, Modifying Factors, Severity)  Note limiting factors. Chief Complaint: Kamala Mayer is a 57 y.o. male who presents to the emergency department, the patient states that 6:00 in the morning woke up with some left arm pain, then at 945 he noticed that he was very dizzy and weak. He presents here to the emergency department 3 hours later stating that he has numbness and tingling to his left arm, left leg and left cheek. He has had a previous history of an aneurysmal bleed. He admits to nausea, some chest pain, and feeling dizzy with a headache. Nothing seems to make him feel better. He rates his pain level as 6/10. I was called to the room emergently to evaluate for a stroke. Nursing Notes were all reviewed and agreed with or any disagreements were addressed in the HPI. REVIEW OF SYSTEMS    (2-9 systems for level 4, 10 or more for level 5)     Review of Systems   Constitutional: Negative for chills, diaphoresis and fever. HENT: Negative for congestion, ear pain, rhinorrhea and sore throat. Eyes: Negative for pain and visual disturbance. Respiratory: Negative for cough and shortness of breath. Cardiovascular: Positive for chest pain. Negative for palpitations and leg swelling. Gastrointestinal: Positive for nausea. Negative for abdominal pain, constipation, diarrhea and vomiting. Genitourinary: Negative for decreased urine volume, dysuria, frequency and urgency. Musculoskeletal: Negative for back pain and neck pain. Skin: Negative for rash and wound. Neurological: Positive for dizziness. Negative for light-headedness.        PAST MEDICAL HISTORY     Past Medical History:   Diagnosis Date    Asthma     Cancer ([facility] [address]     skin    Deep vein thrombosis (DVT) (HCC) in legs and traveled to lungs    FH: CAD (coronary artery disease) 1/8/2014    Former smoker 1/8/2014    GERD (gastroesophageal reflux disease)     High cholesterol     Hx of blood clots     Lung mass     Lung nodule 6/3/2012    histoplasmosis    S/P thoracotomy 7/9/2012    Right thoracotomy, RUL excisional biopsy of nodule with FS, 5 level intercostal nerve block            SURGICAL HISTORY     Past Surgical History:   Procedure Laterality Date    APPENDECTOMY      1996    COLONOSCOPY      COLONOSCOPY N/A 1/24/2019    COLONOSCOPY performed by Lazaro Jose MD at [address]  05/12/14    WNL  - no stents    ENDOSCOPY, COLON, DIAGNOSTIC      EYE SURGERY      laser surgery     FOOT SURGERY Right     bone spur    KNEE ARTHROSCOPY Left     X 2    THORACOTOMY  7-9-2012    Right thoracotomy, right upper lobe excisional biopsy with frozen section 5 level intercostal nerve block    TONSILLECTOMY         CURRENTMEDICATIONS       Previous Medications    ATORVASTATIN (LIPITOR) 40 MG TABLET    TAKE ONE TABLET BY MOUTH NIGHTLY    OMEPRAZOLE (PRILOSEC) 10 MG DELAYED RELEASE CAPSULE    Take 10 mg by mouth daily    WARFARIN (COUMADIN) 5 MG TABLET           ALLERGIES     Bee venom, Nutritional supplements, and Penicillins    FAMILYHISTORY       Family History   Problem Relation Age of Onset    Diabetes Mother     Parkinsonism Mother     Cancer Mother         MASTECTOMY, BREAST CA, SPREAD    Heart Disease Father         CHF  Heart Disease Brother         CABG 3 VESSELLS    Other Brother         CHROHN'S DISEASE        SOCIAL HISTORY       Social History     Socioeconomic History    Marital status:      Spouse name: None    Number of children: None    Years of education: None    Highest education level: None   Occupational History    None   Tobacco Use    Smoking status: Former Smoker     Packs/day: 1.00     Years: 25.00     Pack years: 25.00     Types: Cigarettes     Quit date: 2000     Years since quittin.6    Smokeless tobacco: Never Used   Vaping Use    Vaping Use: Never used   Substance and Sexual Activity    Alcohol use: No    Drug use: No    Sexual activity: Yes     Partners: Female   Other Topics Concern    None   Social History Narrative    None     Social Determinants of Health     Financial Resource Strain:     Difficulty of Paying Living Expenses: Not on file   Food Insecurity:     Worried About Running Out of Food in the Last Year: Not on file    Sachi of Food in the Last Year: Not on file   Transportation Needs:     Lack of Transportation (Medical): Not on file    Lack of Transportation (Non-Medical):  Not on file   Physical Activity:     Days of Exercise per Week: Not on file    Minutes of Exercise per Session: Not on file   Stress:     Feeling of Stress : Not on file   Social Connections:     Frequency of Communication with Friends and Family: Not on file    Frequency of Social Gatherings with Friends and Family: Not on file    Attends Confucianism Services: Not on file    Active Member of Clubs or Organizations: Not on file    Attends Club or Organization Meetings: Not on file    Marital Status: Not on file   Intimate Partner Violence:     Fear of Current or Ex-Partner: Not on file    Emotionally Abused: Not on file    Physically Abused: Not on file    Sexually Abused: Not on file   Housing Stability:     Unable to Pay for Housing in the Last Year: Not on file    Number of Places Lived in the Last Year: Not on file    Unstable Housing in the Last Year: Not on file       SCREENINGS   NIH Stroke Scale  Interval: Baseline  Level of Consciousness (1a. ): Alert  LOC Questions (1b. ): Answers both correctly  LOC Commands (1c. ): Performs both tasks correctly  Best Gaze (2. ): Normal  Visual (3. ): No visual loss  Facial Palsy (4. ): Normal symmetrical movement  Motor Arm, Left (5a. ): No drift  Motor Arm, Right (5b. ): No drift  Motor Leg, Left (6a. ): No drift  Motor Leg, Right (6b. ): No drift  Limb Ataxia (7. ): Absent  Sensory (8. ): (!) Mild to Moderate  Best Language (9. ): No aphasia  Dysarthria (10. ): Normal  Extinction and Inattention (11): No abnormality  Total: 1Glasgow Coma Scale  Eye Opening: Spontaneous  Best Verbal Response: Oriented  Best Motor Response: Obeys commands  Marck Coma Scale Score: 15        PHYSICAL EXAM    (up to 7 for level 4, 8 or more for level 5)     ED Triage Vitals [01/07/22 1236]   BP Temp Temp Source Pulse Resp SpO2 Height Weight   (!) 149/88 98.6 °F (37 °C) Oral 83 20 98 % 6' 5\" (1.956 m) 222 lb 9.6 oz (101 kg)       Physical Exam  Vitals and nursing note reviewed. Constitutional:       Appearance: Normal appearance. He is well-developed. He is not ill-appearing or diaphoretic. HENT:      Head: Normocephalic and atraumatic. Right Ear: External ear normal.      Left Ear: External ear normal.      Nose: Nose normal.   Eyes:      General: No scleral icterus. Right eye: No discharge. Left eye: No discharge. Conjunctiva/sclera: Conjunctivae normal.      Pupils: Pupils are equal, round, and reactive to light. Funduscopic exam:     Right eye: No papilledema. Left eye: No papilledema. Cardiovascular:      Rate and Rhythm: Normal rate and regular rhythm. Heart sounds: Normal heart sounds. No murmur heard. No friction rub. No gallop.     Pulmonary:      Effort: Pulmonary effort is normal. No respiratory the following components:       Result Value    Protime 23.5 (*)     INR 2.02 (*)     All other components within normal limits    Narrative:     Performed at:  [place] [address] [employer]   Phone [phone]   COMPREHENSIVE METABOLIC PANEL W/ REFLEX TO MG FOR LOW K - Abnormal; Notable for the following components:    Sodium 121 (*)     Potassium reflex Magnesium 3.1 (*)     Chloride 91 (*)     CO2 19 (*)     CREATININE <0.5 (*)     Calcium 7.0 (*)     Total Protein 5.9 (*)     Albumin 3.3 (*)     All other components within normal limits    Narrative:     Performed at:  [place] [address] [employer]   Phone [phone]   MAGNESIUM - Abnormal; Notable for the following components:    Magnesium 1.40 (*)     All other components within normal limits    Narrative:     Performed at:  [place] [address] [employer]   Phone [phone]   POCT GLUCOSE - Normal   CBC WITH AUTO DIFFERENTIAL    Narrative:     Performed at:  [address], [facility] [employer]   Phone [phone]    Narrative:     Maria Del Carmen Lei [phone],  Rejected Test Name/Called to:chaparrox,trop/called to albina rn, 01/07/2022 13:18,  by Dheeraj Bustos  Performed at:  [address], [facility] [employer]   Phone [phone]   TROPONIN    Narrative:     Performed at:  [place] [address] [employer]   Phone [phone], URINE   ELECTROLYTES URINE RANDOM   CREATININE, RANDOM URINE   RENAL FUNCTION PANEL   MAGNESIUM   POCT GLUCOSE    Narrative:     Performed at:  [address], [facility] [employer]   Phone [phone]       When ordered, only abnormal lab results are displayed. All other labs were within normal range or not returned as of this dictation. EKG: When ordered, EKG's are interpreted by the Emergency Department Physician in the absence of a cardiologist.  Please see their note for interpretation of EKG. RADIOLOGY:   Non-plain film images such as CT, Ultrasound and MRI are read by the radiologist. Plain radiographic images are visualized andpreliminarily interpreted by the  ED Provider with the below findings:        Interpretation perthe Radiologist below, if available at the time of this note:    XR CHEST PORTABLE   Final Result   No acute cardiopulmonary process. CTA HEAD NECK W CONTRAST   Final Result   No acute abnormality or flow-limiting stenosis of the major arteries of the   head and neck. CT HEAD WO CONTRAST   Preliminary Result   1. No definite evidence of acute intracranial abnormality. 2. Abnormal low attenuation within periventricular/subcortical white matter,   most pronounced in the left frontal parietal region. 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 Follow-up nonemergent   MRI examination may be helpful for more complete evaluation. 3. Slight increase in size of ventricular system and greater prominence of   sulci overlying convexities of cerebral hemispheres when compared to baseline   examinations performed in 2020. Finding in part likely on the basis of   interval complete resolution of previous subdural fluid collections as well   as possible improvement of suspected changes of brain edema on prior studies. Critical results were called by Dr. Kylah Goetz.  Jasper Barron MD to the Emergency   Department on 1/7/2022 at 13:20.           CT HEAD WO CONTRAST    Result Date: 1/7/2022  EXAMINATION: CT OF THE HEAD WITHOUT CONTRAST  1/7/2022 12:53 pm TECHNIQUE: CT of the head was performed without the administration of intravenous contrast. Dose modulation, iterative reconstruction, and/or weight based adjustment of the mA/kV was utilized to reduce the radiation dose to as low as reasonably achievable. COMPARISON: Prior studies most recent 07/03/2020. HISTORY: ORDERING SYSTEM PROVIDED HISTORY: Stroke Symptoms TECHNOLOGIST PROVIDED HISTORY: Has a \"code stroke\" or \"stroke alert\" been called? ->Yes Reason for exam:->Stroke Symptoms Decision Support Exception - unselect if not a suspected or confirmed emergency medical condition->Emergency Medical Condition (MA) Reason for Exam: left sided numbness FINDINGS: BRAIN/VENTRICLES: There is mild prominence of the ventricular system. The ventricular system is increased in size when compared to prior examinations performed in 2020. There is prominence of sulci overlying convexities of cerebral hemispheres and cerebellum consistent with atrophy. Mild increase in size of the ventricular system and greater prominence of cerebral sulci when compared to studies performed in 2020 in part likely related to resolution of bilateral subdural fluid collections as well as possible resolution of previously suspected brain edema. There is abnormal low attenuation within periventricular/subcortical white matter, most pronounced in the left frontoparietal regions. Finding is nonspecific however may represent changes associated with ischemic leukoencephalopathy. Bilateral basal ganglia calcification is again identified. No abnormal extra-axial fluid collection is identified on the current examinations. ORBITS: The visualized portion of the orbits demonstrate no acute abnormality. SINUSES: The visualized paranasal sinuses and mastoid air cells demonstrate no acute abnormality. SOFT TISSUES/SKULL:  No acute abnormality of the visualized skull or soft tissues. 1. No definite evidence of acute intracranial abnormality. 2. Abnormal low attenuation within periventricular/subcortical white matter, most pronounced in the left frontal parietal region. 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 Follow-up nonemergent MRI examination may be helpful for more complete evaluation. 3. Slight increase in size of ventricular system and greater prominence of sulci overlying convexities of cerebral hemispheres when compared to baseline examinations performed in 2020. Finding in part likely on the basis of interval complete resolution of previous subdural fluid collections as well as possible improvement of suspected changes of brain edema on prior studies. Critical results were called by Dr. Diamond Sherwood. Charu Henriquez MD to the Emergency Department on 1/7/2022 at 13:20. XR CHEST PORTABLE    Result Date: 1/7/2022  EXAMINATION: ONE XRAY VIEW OF THE CHEST 1/7/2022 1:05 pm COMPARISON: July 27, 2018 HISTORY: ORDERING SYSTEM PROVIDED HISTORY: stroke symptoms TECHNOLOGIST PROVIDED HISTORY: Reason for exam:->stroke symptoms Reason for Exam: dizziness, stroke symptoms FINDINGS: The cardiomediastinal silhouette is borderline in size. The lungs are clear. No pleural effusion or pneumothorax is identified. No acute cardiopulmonary process. CTA HEAD NECK W CONTRAST    Result Date: 1/7/2022  EXAMINATION: CTA OF THE HEAD AND NECK WITH CONTRAST 1/7/2022 12:54 pm: TECHNIQUE: CTA of the head and neck was performed with the administration of intravenous contrast. Multiplanar reformatted images are provided for review. MIP images are provided for review. Stenosis of the internal carotid arteries measured using NASCET criteria. Dose modulation, iterative reconstruction, and/or weight based adjustment of the mA/kV was utilized to reduce the radiation dose to as low as reasonably achievable. COMPARISON: Noncontrast CT head from earlier today HISTORY: ORDERING SYSTEM PROVIDED HISTORY: Stroke Symptoms TECHNOLOGIST PROVIDED HISTORY: Has a \"code stroke\" or \"stroke alert\" been called? ->Yes Reason for exam:->Stroke Symptoms Decision Support Exception - unselect if not a suspected or confirmed emergency medical condition->Emergency Medical Condition (MA) Reason for Exam: left sided numbness FINDINGS: CTA NECK: AORTIC ARCH/ARCH VESSELS: No dissection or arterial injury. No significant stenosis of the brachiocephalic or subclavian arteries. CAROTID ARTERIES: No dissection, arterial injury, or hemodynamically significant stenosis by NASCET criteria. VERTEBRAL ARTERIES: No dissection, arterial injury, or significant stenosis. SOFT TISSUES: There are postoperative changes in the right upper lobe. There is bronchial wall thickening, likely related to small airway disease or bronchiolitis. No cervical or superior mediastinal lymphadenopathy. The larynx and pharynx are unremarkable. No acute abnormality of the salivary and thyroid glands. BONES: No acute osseous abnormality. There are moderate degenerative changes in the cervical spine. CTA HEAD: ANTERIOR CIRCULATION: No significant stenosis of the intracranial internal carotid, anterior cerebral, or middle cerebral arteries. No aneurysm. POSTERIOR CIRCULATION: No significant stenosis of the vertebral, basilar, or posterior cerebral arteries. No aneurysm. OTHER: No dural venous sinus thrombosis on this non-dedicated study. BRAIN: No mass effect or midline shift. No extra-axial fluid collection. The gray-white differentiation is maintained. Periventricular white matter low attenuation most pronounced in the left cerebral hemisphere, likely related to moderate chronic microvascular disease. No acute abnormality or flow-limiting stenosis of the major arteries of the head and neck. PROCEDURES   Unless otherwise noted below, none     Procedures    CRITICAL CARE TIME     Critical Care  There was a high probability of life-threatening clinical deterioration in the patient's condition requiring my urgent intervention. Total critical care time with the patient was 40 minutes excluding separately reportable procedures. Critical care required due to patients stroke and neurologic weakness      CONSULTS:  IP CONSULT TO PHARMACY  PHARMACY TO CHANGE BASE FLUIDS      EMERGENCY DEPARTMENT COURSE and DIFFERENTIAL DIAGNOSIS/MDM:   Vitals:    Vitals:    01/07/22 1236 01/07/22 1315 01/07/22 1345 01/07/22 1415   BP: (!) 149/88 (!) 150/85 114/89 (!) 144/92   Pulse: 83 84 81 81   Resp: 20      Temp: 98.6 °F (37 °C)      TempSrc: Oral      SpO2: 98% 99% 98% 97%   Weight: 222 lb 9.6 oz (101 kg)      Height: 6' 5\" (1.956 m)          Patient was given thefollowing medications:  Medications   calcium gluconate 2,000 mg in sodium chloride 0.9 % 100 mL IVPB (has no administration in time range)   magnesium sulfate 2000 mg in 50 mL IVPB premix (2,000 mg IntraVENous New Bag 1/7/22 1458)   0.9 % sodium chloride infusion ( IntraVENous New Bag 1/7/22 1458)   iopamidol (ISOVUE-370) 76 % injection 75 mL (75 mLs IntraVENous Given 1/7/22 1301)   potassium chloride (KLOR-CON M) extended release tablet 40 mEq (40 mEq Oral Given 1/7/22 1458)              I spoke to the  Stroke team in real time. We concluded that the patient had minimal deficients, was able to ambulate without significant deficient. We discussed TPA, however the Stroke team related that the risk of bleed outweighs any potential benefit at this time. Reevaluation of this patient is reassuring, they remained stable in the department, had no significant increase in deficits. Differential diagnosis does include hypoglycemia, electrolyte imbalance that is causing some of these strokelike symptoms or stroke mimics or mayi CVA. We will admit the patient for stroke evaluation and work-up. FINAL IMPRESSION      1. Cerebrovascular accident (CVA), unspecified mechanism ([address]    2. Hypokalemia    3. Hyponatremia    4. Hypomagnesemia    5.  Hypocalcemia          DISPOSITION/PLAN   DISPOSITION Decision To Admit 01/07/2022 02:27:21 PM      PATIENT REFERREDTO:  No follow-up provider specified.     DISCHARGE MEDICATIONS:  New Prescriptions    No medications on file       DISCONTINUED MEDICATIONS:  Discontinued Medications    No medications on file              (Please note that portions ofthis note were completed with a voice recognition program.  Efforts were made to edit the dictations but occasionally words are mis-transcribed.)    Mekhi Alex PA-C (electronically signed)             Mekhi Alex PA-C  01/07/22 Garry Cardoso PA-C  01/20/22 1526

## 2022-01-07 NOTE — ED NOTES
Pt resting in bed with wife at [address], [address] [facility]  01/07/22 2117

## 2022-01-07 NOTE — ED NOTES
Pt with decreased sensation to left side of face, left arm and left leg. NIHSS 1. Provider aware and called to bedside.      Junior Foreman RN  01/07/22 3179

## 2022-01-07 NOTE — ED NOTES
Code Stroke activated by Que Linares. Primary RN, Neil Bowden, aware. CT aware.      Patricia Ervin RN  01/07/22 1688

## 2022-01-08 ENCOUNTER — APPOINTMENT (OUTPATIENT)
Dept: MRI IMAGING | Age: 62
DRG: 065 | End: 2022-01-08
Payer: COMMERCIAL

## 2022-01-08 LAB
A/G RATIO: 1.4 (ref 1.1–2.2)
ALBUMIN SERPL-MCNC: 3.9 G/DL (ref 3.4–5)
ALP BLD-CCNC: 61 U/L (ref 40–129)
ALT SERPL-CCNC: 27 U/L (ref 10–40)
ANION GAP SERPL CALCULATED.3IONS-SCNC: 8 MMOL/L (ref 3–16)
AST SERPL-CCNC: 19 U/L (ref 15–37)
BASOPHILS ABSOLUTE: 0 K/UL (ref 0–0.2)
BASOPHILS RELATIVE PERCENT: 0.9 %
BILIRUB SERPL-MCNC: 0.5 MG/DL (ref 0–1)
BUN BLDV-MCNC: 9 MG/DL (ref 7–20)
CALCIUM SERPL-MCNC: 8.7 MG/DL (ref 8.3–10.6)
CHLORIDE BLD-SCNC: 106 MMOL/L (ref 99–110)
CHOLESTEROL, TOTAL: 142 MG/DL (ref 0–199)
CO2: 25 MMOL/L (ref 21–32)
CREAT SERPL-MCNC: 0.7 MG/DL (ref 0.8–1.3)
EOSINOPHILS ABSOLUTE: 0.1 K/UL (ref 0–0.6)
EOSINOPHILS RELATIVE PERCENT: 3.3 %
GFR AFRICAN AMERICAN: >60
GFR NON-AFRICAN AMERICAN: >60
GLUCOSE BLD-MCNC: 95 MG/DL (ref 70–99)
HCT VFR BLD CALC: 43.8 % (ref 40.5–52.5)
HDLC SERPL-MCNC: 37 MG/DL (ref 40–60)
HEMOGLOBIN: 15 G/DL (ref 13.5–17.5)
INR BLD: 1.99 (ref 0.88–1.12)
LACTIC ACID: 1.2 MMOL/L (ref 0.4–2)
LDL CHOLESTEROL CALCULATED: 82 MG/DL
LV EF: 58 %
LVEF MODALITY: NORMAL
LYMPHOCYTES ABSOLUTE: 1.1 K/UL (ref 1–5.1)
LYMPHOCYTES RELATIVE PERCENT: 26.1 %
MAGNESIUM: 2.1 MG/DL (ref 1.8–2.4)
MCH RBC QN AUTO: 31 PG (ref 26–34)
MCHC RBC AUTO-ENTMCNC: 34.3 G/DL (ref 31–36)
MCV RBC AUTO: 90.3 FL (ref 80–100)
MONOCYTES ABSOLUTE: 0.5 K/UL (ref 0–1.3)
MONOCYTES RELATIVE PERCENT: 11.9 %
NEUTROPHILS ABSOLUTE: 2.4 K/UL (ref 1.7–7.7)
NEUTROPHILS RELATIVE PERCENT: 57.8 %
OSMOLALITY: 295 MOSM/KG (ref 280–301)
PDW BLD-RTO: 12.7 % (ref 12.4–15.4)
PHOSPHORUS: 2.1 MG/DL (ref 2.5–4.9)
PLATELET # BLD: 187 K/UL (ref 135–450)
PMV BLD AUTO: 7.9 FL (ref 5–10.5)
POTASSIUM REFLEX MAGNESIUM: 4.5 MMOL/L (ref 3.5–5.1)
POTASSIUM SERPL-SCNC: 4.5 MMOL/L (ref 3.5–5.1)
PROTHROMBIN TIME: 23.2 SEC (ref 9.9–12.7)
RBC # BLD: 4.84 M/UL (ref 4.2–5.9)
SODIUM BLD-SCNC: 139 MMOL/L (ref 136–145)
TOTAL PROTEIN: 6.7 G/DL (ref 6.4–8.2)
TRIGL SERPL-MCNC: 113 MG/DL (ref 0–150)
TROPONIN: <0.01 NG/ML
VLDLC SERPL CALC-MCNC: 23 MG/DL
WBC # BLD: 4.1 K/UL (ref 4–11)

## 2022-01-08 PROCEDURE — 6360000002 HC RX W HCPCS: Performed by: HOSPITALIST

## 2022-01-08 PROCEDURE — 6370000000 HC RX 637 (ALT 250 FOR IP): Performed by: HOSPITALIST

## 2022-01-08 PROCEDURE — A9579 GAD-BASE MR CONTRAST NOS,1ML: HCPCS | Performed by: HOSPITALIST

## 2022-01-08 PROCEDURE — 83735 ASSAY OF MAGNESIUM: CPT

## 2022-01-08 PROCEDURE — 80053 COMPREHEN METABOLIC PANEL: CPT

## 2022-01-08 PROCEDURE — 2580000003 HC RX 258: Performed by: HOSPITALIST

## 2022-01-08 PROCEDURE — 97162 PT EVAL MOD COMPLEX 30 MIN: CPT

## 2022-01-08 PROCEDURE — 97110 THERAPEUTIC EXERCISES: CPT

## 2022-01-08 PROCEDURE — 84484 ASSAY OF TROPONIN QUANT: CPT

## 2022-01-08 PROCEDURE — 83036 HEMOGLOBIN GLYCOSYLATED A1C: CPT

## 2022-01-08 PROCEDURE — 83605 ASSAY OF LACTIC ACID: CPT

## 2022-01-08 PROCEDURE — 97116 GAIT TRAINING THERAPY: CPT

## 2022-01-08 PROCEDURE — C9113 INJ PANTOPRAZOLE SODIUM, VIA: HCPCS | Performed by: HOSPITALIST

## 2022-01-08 PROCEDURE — 93306 TTE W/DOPPLER COMPLETE: CPT

## 2022-01-08 PROCEDURE — 97535 SELF CARE MNGMENT TRAINING: CPT

## 2022-01-08 PROCEDURE — 70553 MRI BRAIN STEM W/O & W/DYE: CPT

## 2022-01-08 PROCEDURE — 36415 COLL VENOUS BLD VENIPUNCTURE: CPT

## 2022-01-08 PROCEDURE — 97166 OT EVAL MOD COMPLEX 45 MIN: CPT

## 2022-01-08 PROCEDURE — 85610 PROTHROMBIN TIME: CPT

## 2022-01-08 PROCEDURE — 6360000004 HC RX CONTRAST MEDICATION: Performed by: HOSPITALIST

## 2022-01-08 PROCEDURE — 1200000000 HC SEMI PRIVATE

## 2022-01-08 PROCEDURE — 99223 1ST HOSP IP/OBS HIGH 75: CPT | Performed by: PSYCHIATRY & NEUROLOGY

## 2022-01-08 PROCEDURE — 80061 LIPID PANEL: CPT

## 2022-01-08 PROCEDURE — 97530 THERAPEUTIC ACTIVITIES: CPT

## 2022-01-08 PROCEDURE — 85025 COMPLETE CBC W/AUTO DIFF WBC: CPT

## 2022-01-08 PROCEDURE — 6370000000 HC RX 637 (ALT 250 FOR IP): Performed by: INTERNAL MEDICINE

## 2022-01-08 RX ORDER — WARFARIN SODIUM 7.5 MG/1
7.5 TABLET ORAL
Status: COMPLETED | OUTPATIENT
Start: 2022-01-08 | End: 2022-01-08

## 2022-01-08 RX ADMIN — SODIUM CHLORIDE, PRESERVATIVE FREE 10 ML: 5 INJECTION INTRAVENOUS at 09:00

## 2022-01-08 RX ADMIN — ATORVASTATIN CALCIUM 40 MG: 40 TABLET, FILM COATED ORAL at 21:13

## 2022-01-08 RX ADMIN — PANTOPRAZOLE SODIUM 40 MG: 40 INJECTION, POWDER, FOR SOLUTION INTRAVENOUS at 09:00

## 2022-01-08 RX ADMIN — ASPIRIN 81 MG: 81 TABLET, COATED ORAL at 09:00

## 2022-01-08 RX ADMIN — GADOTERIDOL 20 ML: 279.3 INJECTION, SOLUTION INTRAVENOUS at 11:09

## 2022-01-08 RX ADMIN — WARFARIN SODIUM 7.5 MG: 7.5 TABLET ORAL at 17:35

## 2022-01-08 RX ADMIN — SODIUM CHLORIDE, PRESERVATIVE FREE 10 ML: 5 INJECTION INTRAVENOUS at 21:16

## 2022-01-08 ASSESSMENT — PAIN - FUNCTIONAL ASSESSMENT: PAIN_FUNCTIONAL_ASSESSMENT: ACTIVITIES ARE NOT PREVENTED

## 2022-01-08 ASSESSMENT — PAIN SCALES - WONG BAKER
WONGBAKER_NUMERICALRESPONSE: 0
WONGBAKER_NUMERICALRESPONSE: 6
WONGBAKER_NUMERICALRESPONSE: 0

## 2022-01-08 ASSESSMENT — PAIN DESCRIPTION - ONSET: ONSET: ON-GOING

## 2022-01-08 ASSESSMENT — PAIN DESCRIPTION - PAIN TYPE: TYPE: ACUTE PAIN

## 2022-01-08 ASSESSMENT — PAIN DESCRIPTION - LOCATION
LOCATION: HEAD
LOCATION: HEAD

## 2022-01-08 ASSESSMENT — PAIN SCALES - GENERAL
PAINLEVEL_OUTOF10: 0
PAINLEVEL_OUTOF10: 5
PAINLEVEL_OUTOF10: 0
PAINLEVEL_OUTOF10: 0

## 2022-01-08 ASSESSMENT — PAIN DESCRIPTION - PROGRESSION: CLINICAL_PROGRESSION: NOT CHANGED

## 2022-01-08 ASSESSMENT — PAIN DESCRIPTION - ORIENTATION: ORIENTATION: ANTERIOR

## 2022-01-08 ASSESSMENT — PAIN DESCRIPTION - DESCRIPTORS: DESCRIPTORS: ACHING

## 2022-01-08 NOTE — CONSULTS
Consult placed    Who:Wilman  Date:1/8/2022,  Time:9:15 AM        Electronically signed by Danielito Chun on 1/8/2022 at 9:15 AM

## 2022-01-08 NOTE — PROGRESS NOTES
Pharmacy Note  Warfarin Consult  Dx: Hx of PE  Goal INR range 2.5-3.5 (new range)  Home Warfarin dose: 5 mg daily except 7.5 mg on Thur     Date                 INR                  Warfarin  1/7                  2.02                   7.5 mg  1/8                  1.99                   7.5 mg     Recommend Warfarin 7.5 mg tonight x1. Daily INR ordered. Rx will continue to manage therapy per consult order.     Steven Mejia, [facility]

## 2022-01-08 NOTE — PROGRESS NOTES
Occupational Therapy   Occupational Therapy Initial Assessment/Treatment   Date: 2022   Patient Name: Natalia Vo  MRN: [id number]     : 1960    Date of Service: 2022    Discharge Recommendations:  Home with assist PRN  OT Equipment Recommendations  Equipment Needed: No    Assessment   Performance deficits / Impairments: Decreased functional mobility ; Decreased safe awareness;Decreased balance;Decreased ADL status; Decreased coordination  Assessment: Pt 58 yo male functioning with deficits in the areas listed above following L sided weakness and headache. Pt reports hx of head injury but is typically independent for adls. Pt is currently at a mod I level for bed mobility and CGa for functional mobility with RW. Pt reports increased pain in head with OOB activity. Pt would benefit from skilled OT services while in acute care. Disease Specific Education: Pt educated on importance of OOB mobility, prevention of complications of bedrest, and general safety during hospitalization. Pt verbalized understanding      Prognosis: Good  Decision Making: Medium Complexity  OT Education: OT Role;Plan of Care;ADL Adaptive Strategies;Precautions; Energy Conservation;IADL Safety  REQUIRES OT FOLLOW UP: Yes  Activity Tolerance  Activity Tolerance: Patient Tolerated treatment well;Patient limited by pain  Activity Tolerance: /83 HR 74 O2 99%  Safety Devices  Safety Devices in place: Yes  Type of devices: Call light within reach; Left in bed;Bed alarm in place;Nurse notified           Patient Diagnosis(es): The primary encounter diagnosis was Cerebrovascular accident (CVA), unspecified mechanism ([facility] [address] Diagnoses of Hypokalemia, Hyponatremia, Hypomagnesemia, and Hypocalcemia were also pertinent to this visit.      has a past medical history of Asthma, Cancer ([address] Deep vein thrombosis (DVT) ([address] FH: CAD (coronary artery disease), Former smoker, GERD (gastroesophageal reflux disease), High cholesterol, Hx of Good  Balance  Sitting Balance: Supervision  Standing Balance: Contact guard assistance (RW)  Standing Balance  Time: ~3 minutes  Activity: functional mobility with RW  Functional Mobility  Functional - Mobility Device: Rolling Walker  Activity: Other  Assist Level: Contact guard assistance  Functional Mobility Comments: increased headache  ADL  LE Dressing: Contact guard assistance (don pants, socks)  Toileting: Independent (urinal)  Tone RUE  RUE Tone: Normotonic  Tone LUE  LUE Tone: Normotonic  Coordination  Movements Are Fluid And Coordinated: No  Coordination and Movement description: Fine motor impairments; Ataxia; Right UE;Left UE (unsure of baseline)        Transfers  Sit to stand: Stand by assistance  Stand to sit: Stand by assistance     Cognition  Overall Cognitive Status: WFL        Sensation  Overall Sensation Status: WFL        LUE AROM (degrees)  LUE AROM : WFL  RUE AROM (degrees)  RUE AROM : WFL  LUE Strength  Gross LUE Strength: WFL                   Plan   Plan  Times per week: 3-5x/wk  Current Treatment Recommendations: Strengthening,Balance Training,Functional Mobility Training,Safety Education & Training,Self-Care / ADL      AM-[facility] Score        -[facility] Inpatient Daily Activity Raw Score: 18 (01/08/22 0928)  AM-[facility] Inpatient ADL T-Scale Score : 38.66 (01/08/22 0928)  ADL Inpatient CMS 0-100% Score: 46.65 (01/08/22 0928)  ADL Inpatient CMS G-Code Modifier : CK (01/08/22 4404)    Goals  Short term goals  Time Frame for Short term goals: 1 week (1/15/22)  Short term goal 1: Pt will complete LB dressing with S.  Short term goal 2: Pt will complete toilet transfer with S.  Short term goal 3: Pt will complete 5 minutes of dynamic standing at sink for adls with S. (0/34/40)  Patient Goals   Patient goals : \"to get better\"       Therapy Time   Individual Concurrent Group Co-treatment   Time In 0831         Time Out 0904         Minutes 33         Timed Code Treatment Minutes: 23 Minutes (10 minutes for evaluation)       Eddi Arcos, OTR/L      If pt is unable to be seen after this session, please let this note serve as discharge summary. Please see case management note for discharge disposition. Thank you.

## 2022-01-08 NOTE — CONSULTS
Pharmacy Note  Warfarin Consult  Dx: Hx of PE  Goal INR range 2.5-3.5 (new range)  Home Warfarin dose: 5 mg daily except 7.5 mg on Thur    Date  INR  Warfarin  1/7                  2.02                   7.5 mg    Recommend Warfarin 7.5 mg tonight x1. Daily INR ordered. Rx will continue to manage therapy per consult order.   Anne Bal, PharmD  1/7/2022 9:48 PM

## 2022-01-08 NOTE — PROGRESS NOTES
Hospitalist Progress Note  1/8/2022 12:07 PM  Subjective:   Admit Date: 1/7/2022  PCP: ZEESHAN Segal CNP  Status: Inpatient [101]  Interval History: Hospital Day: 2, admitted with various neurologic deficits concerning for CVA. Awoke at 6 am (1/7) including LUE pain which progressed to numbness down the left side of his body. He also describes vertiginous symptoms with acute headache. Remote history of subdural hematoma and chronic leukoencephalopathy. Passed swallowing evaluation and NPO changed to regular diet. Diet: Regular  Right antecubital peripheral IV (1/7, day #2)  Medications:     warfarin  dosed by pharmacy Oral Daily   atorvastatin  40 mg Oral Nightly   pantoprazole  40 mg IntraVENous Daily   aspirin  81 mg Oral Daily     Recent Labs     01/07/22  1240 01/08/22  0714   WBC 5.9 4.1   HGB 15.7 15.0    187   MCV 89.9 90.3       01/07/22  1325 01/08/22  0714   * 139   K 3.1* 4.5  4.5   CL 91* 106   CO2 19* 25   BUN 8 9   CREATININE <0.5* 0.7*   GLUCOSE 75 95     Recent Labs     01/07/22  1325 01/08/22  0714   AST 15 19   ALT 21 27   BILITOT 0.4 0.5   ALKPHOS 49 61     Troponin T:  Negative x 3  Magnesium (1/7) 1.40, (1/8) 2.10 mg/dL  Lactate (1/8) 1.2 mmol/L  proBNP (1/7) 24 pg/mL      INR:   01/07/22  1240 01/07/22  2150 01/08/22  0714   2.02* 2.01* 1.99*     MRI brain (1/8) No acute intracranial abnormality. Mild parenchymal volume loss, most pronounced in the right cerebral hemisphere. T2/FLAIR hyperintensity in the white matter, most pronounced in the left cerebral hemisphere, likely related to moderate chronic microvascular disease. Portable CXR (1/7) No acute cardiopulmonary process. CTA head / neck (1/7) No acute abnormality or flow-limiting stenosis of the major arteries of the head and neck. Noncontrast CT head (1/7) No definite evidence of acute intracranial abnormality.   Abnormal low attenuation within periventricular/subcortical white matter, most pronounced in the left frontal parietal region. Phill Son in part may be on the basis of chronic ischemic leukoencephalopathy.  Follow-up nonemergent MRI examination may be helpful for more complete evaluation. Slight increase in size of ventricular system and greater prominence of sulci overlying convexities of cerebral hemispheres when compared to baseline examinations performed in 2020. Finding in part likely on the basis of interval complete resolution of previous subdural fluid collections as well as possible improvement of suspected changes of brain edema on prior studies. Echocardiogram (1/7) Normal left ventricular systolic function with ejection fraction of 55-60%. No regional wall motion abnormalites are seen. Normal left ventricular size with mild concentric left ventricular hypertrophy. Grade I diastolic dysfunction with normal filling pressure. Compared to previous study from 5- no changes noted in left ventricular function. A bubble study was performed and fails to show evidence of shunting. Objective:   Vitals:  /79   Pulse 72   Temp 97.9 °F (oral)   Resp 16   Ht 6' 5\"  Wt 100.5 kg  SpO2 99% on RA  BMI 26.28 kg/m²   General appearance: alert and cooperative with exam  Lungs: clear to auscultation bilaterally  Heart: regular rate and rhythm, S1, S2 normal, no murmur, click, rub or gallop  Abdomen: soft, non-tender; bowel sounds normal; no masses,  no organomegaly  Extremities: extremities normal, atraumatic, no cyanosis or edema  Neurologic: No obvious focal neurologic deficits. Assessment and Plan:   1. Acute CVA acute neurologic deficit:  Empiric aspirin 81 mg and atorvastatin 40 mg daily. PT/OT evaluation. Swallowing evaluation normal.  Neurology evaluation pending. History of subdural hematoma. MRI brain T2/FLAIR hyperintensity in the white matter, most pronounced in the left cerebral hemisphere, likely related to moderate chronic microvascular disease. Echocardiogram pending. 2. Hypokalemia:  Resolved with potassium replacement. 3. Hypovolemic hyponatremia:  Resolved with IV saline volume expansion. 4. Hypomagnesemia:  Resolved with IV magnesium sulfate replacement. 5. History of DVT on chronic anticoagulation with warfarin for 12 years. INR therapeutic. Warfarin dosing per pharmacy.     Advance Directive: Full Code  DVT prophylaxis with warfarin (DVT)  Discharge planning: pending neurology evaluation, PT/OT recommends home health outpatient PT      Sherren Chambers, MD  [facility] & Hospitalist

## 2022-01-08 NOTE — CONSULTS
[facility] Consult Note           Reason for Consult:  Hyponatremia  Requesting Physician:  Dr. Teo Branch    Chief Complaint:  Dizziness, left sided weakness  History Obtained From:  patient, electronic medical record    History of Present Ilness:    57year old male with Asthma, hx of DVT, CAD, Hyperlipidemia, Subdural hematoma, Chronic leukoencephalopathy admitted with dizziness, left sided weakness. We have been asked to assist in further mgmt of his Hyponatremia. SNa 121 on admission, better with IVF's, last at 139. Urine sodium 88, urine chloride 76. Awoke yesterday AM with dizziness, left sided weakness. Denies any reduced intake, weight loss, shortness of breath, abdominal pain, or fatigue. No recent new medications or diuretics.     Past Medical History:        Diagnosis Date    Asthma     Cancer ([facility] [address]     skin    Deep vein thrombosis (DVT) (HCC) in legs and traveled to lungs    FH: CAD (coronary artery disease) 1/8/2014    Former smoker 1/8/2014    GERD (gastroesophageal reflux disease)     High cholesterol     Hx of blood clots     Lung mass     Lung nodule 6/3/2012    histoplasmosis    S/P thoracotomy 7/9/2012    Right thoracotomy, RUL excisional biopsy of nodule with FS, 5 level intercostal nerve block            Past Surgical History:        Procedure Laterality Date    APPENDECTOMY      1996    COLONOSCOPY      COLONOSCOPY N/A 1/24/2019    COLONOSCOPY performed by Jose Rodriguez MD at [address]  05/12/14    WNL  - no stents    ENDOSCOPY, COLON, DIAGNOSTIC      EYE SURGERY      laser surgery     FOOT SURGERY Right     bone spur    KNEE ARTHROSCOPY Left     X 2    THORACOTOMY  7-9-2012    Right thoracotomy, right upper lobe excisional biopsy with frozen section 5 level intercostal nerve block    TONSILLECTOMY         Home Medications:    No current facility-administered medications on file prior to encounter. Current Outpatient Medications on File Prior to Encounter   Medication Sig Dispense Refill    atorvastatin (LIPITOR) 40 MG tablet TAKE ONE TABLET BY MOUTH NIGHTLY 90 tablet 3    warfarin (COUMADIN) 5 MG tablet       omeprazole (PRILOSEC) 10 MG delayed release capsule Take 10 mg by mouth daily         Allergies:  Bee venom, Nutritional supplements, and Penicillins    Social History:    Social History     Socioeconomic History    Marital status:      Spouse name: Not on file    Number of children: Not on file    Years of education: Not on file    Highest education level: Not on file   Occupational History    Not on file   Tobacco Use    Smoking status: Former Smoker     Packs/day: 1.00     Years: 25.00     Pack years: 25.00     Types: Cigarettes     Quit date: 2000     Years since quittin.6    Smokeless tobacco: Never Used   Vaping Use    Vaping Use: Never used   Substance and Sexual Activity    Alcohol use: No    Drug use: No    Sexual activity: Yes     Partners: Female   Other Topics Concern    Not on file   Social History Narrative    Not on file     Social Determinants of Health     Financial Resource Strain:     Difficulty of Paying Living Expenses: Not on file   Food Insecurity:     Worried About Running Out of Food in the Last Year: Not on file    Sachi of Food in the Last Year: Not on file   Transportation Needs:     Lack of Transportation (Medical): Not on file    Lack of Transportation (Non-Medical):  Not on file   Physical Activity:     Days of Exercise per Week: Not on file    Minutes of Exercise per Session: Not on file   Stress:     Feeling of Stress : Not on file   Social Connections:     Frequency of Communication with Friends and Family: Not on file    Frequency of Social Gatherings with Friends and Family: Not on file    Attends Zoroastrian Services: Not on file    Active Member of Clubs or Organizations: Not on file    Attends Club or Organization Meetings: Not on file    Marital Status: Not on file   Intimate Partner Violence:     Fear of Current or Ex-Partner: Not on file    Emotionally Abused: Not on file    Physically Abused: Not on file    Sexually Abused: Not on file   Housing Stability:     Unable to Pay for Housing in the Last Year: Not on file    Number of [place] in the Last Year: Not on file    Unstable Housing in the Last Year: Not on file       Family History:   Family History   Problem Relation Age of Onset    Diabetes Mother     Parkinsonism Mother     Cancer Mother         MASTECTOMY, BREAST CA, SPREAD    Heart Disease Father         CHF    Heart Disease Brother         CABG 3 VESSELLS    Other Brother         CHROHN'S DISEASE       Review of Systems:   Pertinent positives stated above in HPI. Remainder of 10 point review of systems were reviewed and were negative.     Physical exam:   Constitutional:  VITALS:  /86   Pulse 80   Temp 97.9 °F (36.6 °C) (Oral)   Resp 20   Ht 6' 5\" (1.956 m)   Wt 221 lb 9.6 oz (100.5 kg)   SpO2 97%   BMI 26.28 kg/m²   Gen: alert, awake, ill-appearing  Skin: no rash, turgor wnl  Heent:  eomi, mmm  Neck: no bruits or jvd noted, thyroid normal  Cardiovascular:  S1, S2 without m/r/g  Respiratory: CTA B without w/r/r; respiratory effort normal  Abdomen:  +bs, soft, nt, nd, no hepatosplenomegaly  Ext: no lower extremity edema  Psychiatric: mood and affect appropriate; judgement and insight intact  Musculoskeletal:  Rom, muscular strength limited; digits, nails normal    Data/  CBC:   Lab Results   Component Value Date    WBC 4.1 01/08/2022    RBC 4.84 01/08/2022    HGB 15.0 01/08/2022    HCT 43.8 01/08/2022    MCV 90.3 01/08/2022    MCH 31.0 01/08/2022    MCHC 34.3 01/08/2022    RDW 12.7 01/08/2022     01/08/2022    MPV 7.9 01/08/2022     BMP:    Lab Results   Component Value Date     01/08/2022    K 4.5 01/08/2022    K 4.5 01/08/2022     01/08/2022    CO2 25 01/08/2022    BUN 9 01/08/2022    LABALBU 3.9 01/08/2022    CREATININE 0.7 01/08/2022    CALCIUM 8.7 01/08/2022    GFRAA >60 01/08/2022    GFRAA >60 07/10/2012    LABGLOM >60 01/08/2022    GLUCOSE 95 01/08/2022         Assessment/    - Hyponatremia - pre-renal, fluid responsive    - Hypomagnesemia/Hypokalemia/Hypocalcemia - prn replacement    - Dizziness/left sided weakness with concern for acute neurologic deficit - plans per Neurology      Plan/    - Monitoring off of IVF's today  - Recheck labs in AM, f/u urine osmolality      Thank you for the consultation. Please do not hesitate to call with questions. ____________________________________  Johnny Miller MD  [facility]  [email or web address]  Office: [phone]

## 2022-01-08 NOTE — CONSULTS
Consult placed    Who:Dr. Miranda   Date:1/8/2022,  Time:8:14 AM        Electronically signed by Camacho Meyers on 1/8/2022 at 8:14 AM

## 2022-01-08 NOTE — CONSULTS
In patient Neurology consult        [facility]      [place] Severs, [address]  1960    Date of Service: 1/8/2022    Referring Physician: Sandra Palacios MD      Reason for the consult and CC: New onset dizziness and left-sided weakness    HPI:   The patient is a 57y.o.  years old male with history of hypertension and A. fib on Coumadin who was admitted to the hospital yesterday with new onset dizziness. Symptoms started hours prior to admission. Description sudden lightheadedness and ataxia. Degree was severe. No triggers. Duration was persistent. Other associated symptoms including left-sided weakness. No other relieving or aggravating factors. No clear triggers. He came to the ED. Initial work-up showed no large vessel occlusion or acute ischemic stroke. He was admitted to the hospital.  Today he is about the same. Mild improvement. MRI is pending. No headache or chest pain or dysphagia or dysarthria. Other review of system was unremarkable.       Family History   Problem Relation Age of Onset    Diabetes Mother     Parkinsonism Mother     Cancer Mother         MASTECTOMY, BREAST CA, SPREAD    Heart Disease Father         CHF    Heart Disease Brother         CABG 3 VESSELLS    Other Brother         CHROHN'S DISEASE     Past Surgical History:   Procedure Laterality Date    APPENDECTOMY      1996    COLONOSCOPY      COLONOSCOPY N/A 1/24/2019    COLONOSCOPY performed by Sandra Crockett MD at [address]  05/12/14    WNL  - no stents    ENDOSCOPY, COLON, DIAGNOSTIC      EYE SURGERY      laser surgery     FOOT SURGERY Right     bone spur    KNEE ARTHROSCOPY Left     X 2    THORACOTOMY  7-9-2012    Right thoracotomy, right upper lobe excisional biopsy with frozen section 5 level intercostal nerve block    TONSILLECTOMY          Past Medical History:   Diagnosis Date    Asthma     Cancer ([facility] [address]     skin    Deep vein thrombosis (DVT) (HCC) in legs and traveled to lungs    FH: CAD (coronary artery disease) 2014    Former smoker 2014    GERD (gastroesophageal reflux disease)     High cholesterol     Hx of blood clots     Lung mass     Lung nodule 6/3/2012    histoplasmosis    S/P thoracotomy 2012    Right thoracotomy, RUL excisional biopsy of nodule with FS, 5 level intercostal nerve block          Social History     Tobacco Use    Smoking status: Former Smoker     Packs/day: 1.00     Years: 25.00     Pack years: 25.00     Types: Cigarettes     Quit date: 2000     Years since quittin.6    Smokeless tobacco: Never Used   Vaping Use    Vaping Use: Never used   Substance Use Topics    Alcohol use: No    Drug use: No     Allergies   Allergen Reactions    Bee Venom Anaphylaxis     Has epi pen    Nutritional Supplements Anaphylaxis     Pt states he is not allergic to nutritional supplements    Penicillins Hives     Current Facility-Administered Medications   Medication Dose Route Frequency Provider Last Rate Last Admin    warfarin (COUMADIN) tablet 7.5 mg  7.5 mg Oral Once Sachin Arambula MD        atorvastatin (LIPITOR) tablet 40 mg  40 mg Oral Nightly Catana Fleischer, MD   40 mg at 22 2157    pantoprazole (PROTONIX) injection 40 mg  40 mg IntraVENous Daily Catana Fleischer, MD   40 mg at 22 0900    perflutren lipid microspheres (DEFINITY) injection 1.65 mg  1.5 mL IntraVENous ONCE PRN Catana Fleischer, MD        sodium chloride flush 0.9 % injection 10 mL  10 mL IntraVENous 2 times per day Catana Fleischer, MD   10 mL at 22 0900    sodium chloride flush 0.9 % injection 10 mL  10 mL IntraVENous PRN Catana Fleischer, MD        0.9 % sodium chloride infusion  25 mL IntraVENous PRN Catana Fleischer, MD        promethazine (PHENERGAN) tablet 12.5 mg  12.5 mg Oral Q6H PRN Catana Fleischer, MD        Or    ondansetron [facility]) injection 4 mg  4 mg IntraVENous Q6H PRN Cindy BUCHANAN Velma Figueroa MD        [facility]) tablet 8.6 mg  1 tablet Oral Daily PRN Anthony Oreilly MD        bisacodyl (DULCOLAX) suppository 10 mg  10 mg Rectal Daily PRN Anthony Oreilly MD        aspirin EC tablet 81 mg  81 mg Oral Daily Anthony Oreilly MD   81 mg at 01/08/22 0900    Or    aspirin suppository 300 mg  300 mg Rectal Daily Anthony Oreilly MD        labetalol (NORMODYNE;TRANDATE) injection 10 mg  10 mg IntraVENous Q10 Min PRN Anthony Oreilly MD        warfarin (COUMADIN) daily dosing (placeholder)   Other Dani Alvarenga MD   Given at 01/07/22 5487       ROS : A 10-14 system review of constitutional, cardiovascular, respiratory, eyes, musculoskeletal, endocrine, GI, ENT, skin, hematological, genitourinary, psychiatric and neurologic systems was obtained and updated today and is unremarkable except as mentioned in my HPI      Exam:     Constitutional:   Vitals:    01/08/22 0307 01/08/22 0841 01/08/22 0848 01/08/22 1215   BP: 104/67 129/83 131/79 (!) 144/86   Pulse: 74 74 72 72   Resp: 18  16 16   Temp: 98.1 °F (36.7 °C)  97.9 °F (36.6 °C) 97.8 °F (36.6 °C)   TempSrc: Oral  Oral Oral   SpO2: 95% 99% 99% 97%   Weight:       Height:           General appearance:  Normal development and appear in no acute distress. Eye:  Fundus of the eye: Funduscopic examination cannot be performed due to COVID19 restrictions and precautions. Neck: supple  Cardiovascular: No lower leg edema with good pulsation. Mental Status:   Oriented to person, place, problem, and time. Memory: Good immediate recall. Intact remote memory  Normal attention span and concentration. Language: intact naming, repeating and fluency   Good fund of Knowledge. Aware of current events and vocabulary   Cranial Nerves:   II: Visual fields: Full. Pupils: equal, round, reactive to light  III,IV,VI: Extra Ocular Movements are intact.  No nystagmus  V: Facial sensation is intact   VII: Facial strength and movements: intact and symmetric  VIII: Hearing: Intact  IX: Palate elevation is symmetric  XI: Shoulder shrug is intact  XII: Tongue movements are normal  Musculoskeletal: 5/5 in the right side. Left-sided weakness 4/5  Tone: Normal tone. Reflexes: 2+ in the upper extremity and 2+ in the lower extremity   Planters: flexor bilaterally. Coordination: Left pronator drift, no dysmetria with FNF in upper extremities. Normal REM on the right and poor on the left. Sensation: normal to all modalities in both arms and legs. Gait/Posture: Not tested due to weakness    Data:  LABS:   Lab Results   Component Value Date     01/08/2022    K 4.5 01/08/2022    K 4.5 01/08/2022     01/08/2022    CO2 25 01/08/2022    BUN 9 01/08/2022    CREATININE 0.7 01/08/2022    GFRAA >60 01/08/2022    GFRAA >60 07/10/2012    LABGLOM >60 01/08/2022    GLUCOSE 95 01/08/2022    PHOS 2.1 01/08/2022    MG 2.10 01/08/2022    CALCIUM 8.7 01/08/2022     Lab Results   Component Value Date    WBC 4.1 01/08/2022    RBC 4.84 01/08/2022    HGB 15.0 01/08/2022    HCT 43.8 01/08/2022    MCV 90.3 01/08/2022    RDW 12.7 01/08/2022     01/08/2022     Lab Results   Component Value Date    INR 1.99 (H) 01/08/2022    PROTIME 23.2 (H) 01/08/2022       Neuroimaging were independently reviewed by me and discussed results with the patient  Reviewed notes from different physicians  Reviewed lab and blood testing    Impression:  Acute and new onset dizziness with left-sided weakness. Rule out new ischemic stroke/TIA  Dizziness  Hypertension  A. fib on Coumadin  Hyperlipidemia      Recommendation:  MRI brain  Echo  PT and OT  Continue Coumadin  Monitor INR  Telemetry  Speech evaluation  PPI  Statin  Blood pressure monitor  A1c  Lipid panel  Stroke education was provided today  Discussed vestibular precautions  He is on aspirin 81 mg daily. Discussed risk of bleeding with dual therapy  DC planning after the above work-up. Thank you for referring such patient. If you have any questions regarding my consult note, please don't hesitate to call me. Neema Tripathi MD  [phone]    This dictation was generated by voice recognition computer software.  Although all attempts are made to edit the dictation for accuracy, there may be errors in the  transcription that are not intended

## 2022-01-08 NOTE — PROGRESS NOTES
Speech Language Pathology  Consult Note    SLP received consult and completed chart review. RN reports pt is having no difficulty with speaking/swallowing, and formal evaluation is not indicated at this time. Please re-consult SLP if such need should arise in the future. Lashay Jonhson, [address] [facility] FB#[phone]  Speech-Language Pathologist

## 2022-01-09 VITALS
HEART RATE: 74 BPM | SYSTOLIC BLOOD PRESSURE: 124 MMHG | WEIGHT: 216.7 LBS | DIASTOLIC BLOOD PRESSURE: 85 MMHG | OXYGEN SATURATION: 96 % | BODY MASS INDEX: 25.59 KG/M2 | RESPIRATION RATE: 16 BRPM | TEMPERATURE: 98.4 F | HEIGHT: 77 IN

## 2022-01-09 LAB
ALBUMIN SERPL-MCNC: 4 G/DL (ref 3.4–5)
ANION GAP SERPL CALCULATED.3IONS-SCNC: 12 MMOL/L (ref 3–16)
BUN BLDV-MCNC: 11 MG/DL (ref 7–20)
CALCIUM SERPL-MCNC: 8.9 MG/DL (ref 8.3–10.6)
CHLORIDE BLD-SCNC: 104 MMOL/L (ref 99–110)
CO2: 25 MMOL/L (ref 21–32)
CREAT SERPL-MCNC: 0.8 MG/DL (ref 0.8–1.3)
ESTIMATED AVERAGE GLUCOSE: 108.3 MG/DL
GFR AFRICAN AMERICAN: >60
GFR NON-AFRICAN AMERICAN: >60
GLUCOSE BLD-MCNC: 102 MG/DL (ref 70–99)
HBA1C MFR BLD: 5.4 %
INR BLD: 2.44 (ref 0.88–1.12)
MAGNESIUM: 2.3 MG/DL (ref 1.8–2.4)
OSMOLALITY URINE: 355 MOSM/KG (ref 390–1070)
PHOSPHORUS: 3 MG/DL (ref 2.5–4.9)
POTASSIUM SERPL-SCNC: 4 MMOL/L (ref 3.5–5.1)
PROTHROMBIN TIME: 28.6 SEC (ref 9.9–12.7)
SODIUM BLD-SCNC: 141 MMOL/L (ref 136–145)

## 2022-01-09 PROCEDURE — 36415 COLL VENOUS BLD VENIPUNCTURE: CPT

## 2022-01-09 PROCEDURE — 80069 RENAL FUNCTION PANEL: CPT

## 2022-01-09 PROCEDURE — 6370000000 HC RX 637 (ALT 250 FOR IP): Performed by: HOSPITALIST

## 2022-01-09 PROCEDURE — 6360000002 HC RX W HCPCS: Performed by: HOSPITALIST

## 2022-01-09 PROCEDURE — 2580000003 HC RX 258: Performed by: HOSPITALIST

## 2022-01-09 PROCEDURE — 85610 PROTHROMBIN TIME: CPT

## 2022-01-09 PROCEDURE — 83735 ASSAY OF MAGNESIUM: CPT

## 2022-01-09 PROCEDURE — C9113 INJ PANTOPRAZOLE SODIUM, VIA: HCPCS | Performed by: HOSPITALIST

## 2022-01-09 RX ORDER — ASPIRIN 81 MG/1
81 TABLET ORAL DAILY
Qty: 30 TABLET | Refills: 3 | Status: SHIPPED | OUTPATIENT
Start: 2022-01-10

## 2022-01-09 RX ORDER — WARFARIN SODIUM 5 MG/1
5 TABLET ORAL DAILY
Status: DISCONTINUED | OUTPATIENT
Start: 2022-01-09 | End: 2022-01-09 | Stop reason: HOSPADM

## 2022-01-09 RX ADMIN — PANTOPRAZOLE SODIUM 40 MG: 40 INJECTION, POWDER, FOR SOLUTION INTRAVENOUS at 08:10

## 2022-01-09 RX ADMIN — ASPIRIN 81 MG: 81 TABLET, COATED ORAL at 08:10

## 2022-01-09 RX ADMIN — SODIUM CHLORIDE, PRESERVATIVE FREE 10 ML: 5 INJECTION INTRAVENOUS at 08:10

## 2022-01-09 ASSESSMENT — PAIN SCALES - GENERAL: PAINLEVEL_OUTOF10: 0

## 2022-01-09 NOTE — CARE COORDINATION
CASE MANAGEMENT INITIAL ASSESSMENT      Reviewed chart and completed assessment with patient:  Explained Case Management role/services. Primary contact information:[facility] Decision Maker :   Primary Decision Maker: Bryn Meek - Spouse - [phone]          Can this person be reached and be able to respond quickly, such as within a few minutes or hours? Yes    Admit date/status:1/7/22  Diagnosis:hypocalcemia   Is this a Readmission?:  No      Insurance:medical mutual   Precert required for SNF: Yes       3 night stay required: No    Living arrangements, Adls, care needs, prior to admission:lives in 2 story home with wife and son. 1st floor master      Transportation:private,     Durable Medical Equipment at home:  Walker_x doesn't use. _Cane__RTS__ BSC__Shower Chair__  02__ HHN__ CPAP__  BiPap__  Hospital Bed__ W/C___ Other__________  Wants a cane. Services in the home and/or outpatient, prior to admission:none      PT/OT recs:24hr assist and OP therapy    Hospital Exemption Notification (HEN):needed for SNF    Barriers to discharge:none    Plan/comments:spoke with patient. Plans on returning home at discharge with support of wife. Would like to get a cane for ambulatory support. Will be able to get to any follow up appointments.  Vince Fonseca RN      ECOC on chart for MD signature

## 2022-01-09 NOTE — DISCHARGE SUMMARY
taking on: January 10, 2022     CONTINUE taking these medications    atorvastatin 40 MG tablet  Commonly known as: LIPITOR  TAKE ONE TABLET BY MOUTH NIGHTLY     omeprazole 10 MG delayed release capsule  Commonly known as: PRILOSEC     warfarin 5 MG tablet  Commonly known as: COUMADIN  Take as directed. If you are unsure how to take this medication, talk to your nurse or doctor. Consults:  Neurology (Dr Eben Love), Pharmacy (warfarin dosing), nephrology (Dr Gaye Ya)      Significant Diagnostic Studies:   Troponin T:  Negative x 3  Magnesium (1/7) 1.40, (1/8) 2.10, (1/9) 2.30 mg/dL  Lactate (1/8) 1.2 mmol/L  proBNP (1/7) 24 pg/mL  Hemoglobin A1c (1/8) 5.4%    MRI brain (1/8) No acute intracranial abnormality.  Mild parenchymal volume loss, most pronounced in the right cerebral hemisphere.  T2/FLAIR hyperintensity in the white matter, most pronounced in the left cerebral hemisphere, likely related to moderate chronic microvascular disease.       Portable CXR (1/7) No acute cardiopulmonary process.     CTA head / neck (1/7) No acute abnormality or flow-limiting stenosis of the major arteries of the head and neck.     Noncontrast CT head (1/7) No definite evidence of acute intracranial abnormality.  Abnormal low attenuation within periventricular/subcortical white matter, most pronounced in the left frontal parietal region.  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  Follow-up nonemergent MRI examination may be helpful for more complete evaluation. Slight increase in size of ventricular system and greater prominence of sulci overlying convexities of cerebral hemispheres when compared to baseline examinations performed in 2020.  Finding in part likely on the basis of interval complete resolution of previous subdural fluid collections as well as possible improvement of suspected changes of brain edema on prior studies.     Echocardiogram (1/7) Normal left ventricular systolic function with ejection fraction of 55-60%.  No regional wall motion abnormalites are seen.  Normal left ventricular size with mild concentric left ventricular hypertrophy. Grade I diastolic dysfunction with normal filling pressure.  Compared to previous study from 5- no changes noted in left ventricular function.  A bubble study was performed and fails to show evidence of shunting. Treatments:   Addition of aspirin to regimen, PT/OT, neurology evaluation. Telemetry.      Disposition:   Home with self care  Follow up with ZEESHAN Dickerson CNP at next scheduled appointment on Monday, January 17      Signed:  Heidy Kang MD  1/9/2022, 1:49 PM

## 2022-01-09 NOTE — DISCHARGE INSTR - COC
Continuity of Care Form    Patient Name: Luigi Stahl   :  1960  MRN:  [id number]    Admit date:  2022  Discharge date:  ***    Code Status Order: Full Code   Advance Directives:      Admitting Physician:  Veronika Tavarez MD  PCP: Jonathan Mixon, APRN - CNP    Discharging Nurse: [facility] Unit/Room#: 7727/1175-22  Discharging Unit Phone Number: ***    Emergency Contact:   Extended Emergency Contact Information  Primary Emergency Contact: [facility]  Address: [address] [place] [address], [place] [address] Ada Fees of [address] Phone: [phone]  Work Phone: [phone]  Mobile Phone: [phone]  Relation: Spouse  Secondary Emergency Contact: [address] [place] [address] Phone: [phone]  Relation: Other    Past Surgical History:  Past Surgical History:   Procedure Laterality Date    APPENDECTOMY          COLONOSCOPY      COLONOSCOPY N/A 2019    COLONOSCOPY performed by Jessica Anderson MD at [address] [place] [address] & [email or web address]  14    WNL  - no stents    ENDOSCOPY, COLON, DIAGNOSTIC      EYE SURGERY      laser surgery     FOOT SURGERY Right     bone spur    KNEE ARTHROSCOPY Left     X 2    THORACOTOMY  2012    Right thoracotomy, right upper lobe excisional biopsy with frozen section 5 level intercostal nerve block    TONSILLECTOMY         Immunization History:   Immunization History   Administered Date(s) Administered    COVID-19, Pfizer, PF, 30mcg/0.3mL 2021, 2021, 2021    Influenza 10/03/2015    Influenza Vaccine, unspecified formulation 2016    Influenza Virus Vaccine 10/30/2003, 2009, 2010, 10/09/2010, 2011, 2016, 10/21/2017, 10/06/2018, 10/26/2019    Influenza Whole 2005    Influenza, Quadv, IM, PF (6 mo and older Fluzone, Flulaval, Fluarix, and 3 yrs and older Afluria) 10/21/2017, 2020    Influenza, Quadv, Recombinant, IM PF (Flublok 18 yrs and older) 10/27/2019    Pneumococcal Polysaccharide ([id number]) 05/10/2014    Td, unspecified formulation 2000    Tdap (Boostrix, Adacel) 2011       Active Problems:  Patient Active Problem List   Diagnosis Code    H/o Pulmonary embolus I26.99    Long term current use of anticoagulant Z79.01    Personal history of DVT (deep vein thrombosis) Z86.718    Chest pain R07.9    Mixed hyperlipidemia E78.2    Abnormal stress test R94.39    Syncope and collapse R55    Bone spur of right foot M77.51    Cellulitis and abscess of foot, except toes L03.119, L02.619    Foot ulcer (HCC) L97.509    Gastroesophageal reflux disease without esophagitis K21.9    Subdural hematoma ([facility] [address] S06.5X9A    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    Former smoker Z87.891    Acute hyponatremia E87.1    Hypomagnesemia E83.42    Acute focal neurological deficit R29.818    Cerebrovascular accident (CVA) ([facility] [address] I63.9    HTN (hypertension), benign I10    PAF (paroxysmal atrial fibrillation) (HCC) I48.0    Dizziness R42       Isolation/Infection:   Isolation            No Isolation          Patient Infection Status       Infection Onset Added Last Indicated Last Indicated By Review Planned Expiration Resolved Resolved By    None active    Resolved    COVID-19 21 COVID-19   21     COVID-19 (Rule Out) 20 COVID-19 (Ordered)   20 Rule-Out Test Resulted            Nurse Assessment:  Last Vital Signs: /85   Pulse 74   Temp 98.4 °F (36.9 °C) (Oral)   Resp 16   Ht 6' 5\" (1.956 m)   Wt 216 lb 11.2 oz (98.3 kg)   SpO2 96%   BMI 25.70 kg/m²     Last documented pain score (0-10 scale): Pain Level: 0  Last Weight:   Wt Readings from Last 1 Encounters:   22 216 lb 11.2 oz (98.3 kg)     Mental Status:  {IP PT MENTAL STATUS:12593}    IV Access:  {[facility] IV ACCESS:779198682}    Nursing Mobility/ADLs:  Walking   {[facility] DME MIBT:634906022}  Transfer  {[facility] DME ODDM:230936269}  Bathing  {CHP DME [facility]:926466107}  Dressing  {[facility] DME OLXQ:349628995}  Toileting  {[facility] DME ZGTH:070228465}  Feeding  {[facility] DME MLHX:801322589}  Med Admin  {[facility] DME TTDY:486497733}  Med Delivery   { JERRY MED Delivery:670419959}    Wound Care Documentation and Therapy:        Elimination:  Continence: Bowel: {YES / RY:04730}  Bladder: {YES / DZ:00873}  Urinary Catheter: {Urinary Catheter:459605308}   Colostomy/Ileostomy/Ileal Conduit: {YES / FJ:43388}       Date of Last BM: ***    Intake/Output Summary (Last 24 hours) at 2022 1359  Last data filed at 2022 0918  Gross per 24 hour   Intake 720 ml   Output --   Net 720 ml     I/O last 3 completed shifts:   In: 56 [P.O.:480; I.V.:10]  Out: 0     Safety Concerns:     { JERRY Safety Concerns:118264350}    Impairments/Disabilities:      [facility] Impairments/Disabilities:209787804}    Nutrition Therapy:  Current Nutrition Therapy:   [address] Diet List:841667437}    Routes of Feeding: {[facility] DME Other Feedings:564044898}  Liquids: {Slp liquid thickness:71227}  Daily Fluid Restriction: {[facility] DME Yes amt example:372682037}  Last Modified Barium Swallow with Video (Video Swallowing Test): {Done Not Done WIGZ:340710907}    Treatments at the Time of Hospital Discharge:   Respiratory Treatments: ***  Oxygen Therapy:  {Therapy; copd oxygen:35012}  Ventilator:    {[facility] Vent KXBU:898363634}    Rehab Therapies: {THERAPEUTIC INTERVENTION:2467030962}  Weight Bearing Status/Restrictions: [facility] Weight Bearin}  Other Medical Equipment (for information only, NOT a DME order):  {EQUIPMENT:621140108}  Other Treatments: ***    Patient's personal belongings (please select all that are sent with patient):  {[facility] DME Belongings:716743902}    RN SIGNATURE:  {Esignature:932646572}    CASE MANAGEMENT/SOCIAL WORK SECTION    Inpatient Status Date: ***    Readmission Risk Assessment Score:  Readmission Risk              Risk of Unplanned Readmission:  12           Discharging to Facility/ Agency   Name: Address:  Phone:  Fax:    Dialysis Facility (if applicable)   Name:  Address:  Dialysis Schedule:  Phone:  Fax:    / signature: {Esignature:858929721}    PHYSICIAN SECTION    Prognosis: {Prognosis:4080951747}    Condition at Discharge: Mary Jean-Baptiste Patient Condition:998445029}    Rehab Potential (if transferring to Rehab): {Prognosis:6593275712}    Recommended Labs or Other Treatments After Discharge: ***    Physician Certification: I certify the above information and transfer of Subha Reagan  is necessary for the continuing treatment of the diagnosis listed and that he requires {Admit to Appropriate Level of Care:27262} for {GREATER/LESS:491274760} 30 days.      Update Admission H&P: {[facility] DME Changes in RVB:431799441}    PHYSICIAN SIGNATURE:  {Esignature:758162807}

## 2022-01-09 NOTE — PROGRESS NOTES
Pharmacy Note  Warfarin Consult  Dx: Hx of PE  Goal INR range 2.5-3.5 (new range)  Home Warfarin dose: 5 mg daily except 7.5 mg on Thur     Date                 INR                  Warfarin  1/7                  2.02                   7.5 mg  1/8                  1.99                   7.5 mg  1/9                  2.44                   5 mg     Recommend Warfarin 5 mg tonight x1. Daily INR ordered. Rx will continue to manage therapy per consult order.      DHAVAL Sandoval [facility], R.Ph. 1/9/2022 12:32 PM

## 2022-01-10 ENCOUNTER — CARE COORDINATION (OUTPATIENT)
Dept: OTHER | Facility: CLINIC | Age: 62
End: 2022-01-10

## 2022-01-10 NOTE — CARE COORDINATION
Patient returned [facility]'s call and left a vm messaging stating he can be reached on home phone number. [facility] attempted to reach patient for introduction to Associate Care Management related to admission on 1/7/2022-1/9/2022. HIPAA compliant message left requesting a return phone call. Will attempt to outreach patient again.

## 2022-01-10 NOTE — CARE COORDINATION
Care Transitions Outreach Attempt    Call within 2 business days of discharge: Yes   Attempted to reach patient for transitions of care follow up. Unable to reach patient. Patient: Luigi Stahl Patient : 1960 MRN: [id number]    Last Discharge [facility]       Complaint Diagnosis Description Type Department Provider    22 Dizziness Cerebrovascular accident (CVA), unspecified mechanism ([address] . .. ED to Hosp-Admission (Discharged) (ADMITTED) [place] [address] aKlpana Willard MD; Joyce Woods. .. Was this an external facility discharge?  No Discharge Facility: [facility]    Noted following upcoming appointments from discharge chart review:   [facility] follow up appointment(s):   Future Appointments   Date Time Provider Maurizio Elder   2022 11:45 AM [address]   2022 10:20 AM ZEESHAN Garrido - CNP [facility] follow up appointment(s):

## 2022-01-11 ENCOUNTER — CARE COORDINATION (OUTPATIENT)
Dept: OTHER | Facility: CLINIC | Age: 62
End: 2022-01-11

## 2022-01-11 NOTE — CARE COORDINATION
[address] Transitions Initial Follow Up Call    Call within 2 business days of discharge: Yes    Patient: Elizabeth Patterson Patient : 1960   MRN: [id number]  Reason for Admission: hypokalemia, hyponatremia, hypomagnesemia, hypocalcemia  Discharge Date: 22 RARS: Readmission Risk Score: 7.3 ( )      Last Discharge [facility]       Complaint Diagnosis Description Type Department Provider    22 Dizziness Cerebrovascular accident (CVA), unspecified mechanism ([facility] [address] . .. ED to Hosp-Admission (Discharged) (ADMITTED) [place] Sachin Arambula MD; Lyndsey Bernstein. .. Transitions of Care Initial Call    Was this an external facility discharge? No Discharge Facility: [address] [place] [address] to be reviewed by the provider   Additional needs identified to be addressed with provider: No  none             Method of communication with provider : none      Advance Care Planning:   Does patient have an Advance Directive: reviewed and current. Was this a readmission? No  Patient stated reason for admission: CVA  Patients top risk factors for readmission: medical condition-CVA    Care Transition Nurse (CTN) contacted the patient by telephone to perform post hospital discharge assessment. Verified name and  with patient as identifiers. Provided introduction to self, and explanation of the CTN role. CTN reviewed discharge instructions, medical action plan and red flags with patient who verbalized understanding. Patient given an opportunity to ask questions and does not have any further questions or concerns at this time. Were discharge instructions available to patient? Yes. Reviewed appropriate site of care based on symptoms and resources available to patient including: PCP, Specialist, Benefits related nurse triage line, Urgent care clinics, [facility] When to call [employer]. The patient agrees to contact the PCP office for questions related to their healthcare. Medication reconciliation was performed with patient, who verbalizes understanding of administration of home medications. Advised obtaining a 90-day supply of all daily and as-needed medications. Covid Risk Education     Educated patient about risk for severe COVID-19 due to risk factors according to CDC guidelines. ACM reviewed discharge instructions, medical action plan and red flag symptoms with the patient who verbalized understanding. Discussed COVID vaccination status: Yes. Education provided on COVID-19 vaccination as appropriate. Discussed exposure protocols and quarantine with CDC Guidelines. Patient was given an opportunity to verbalize any questions and concerns and agrees to contact ACM or health care provider for questions related to their healthcare. Reviewed and educated patient on any new and changed medications related to discharge diagnosis. Was patient discharged with a pulse oximeter? No Discussed and confirmed pulse oximeter discharge instructions and when to notify provider or seek emergency care. AC provided contact information. Plan for follow-up call in 5-7 days based on severity of symptoms and risk factors. Plan for next call: follow up appointment-PCP 1/17/2022     Patient states he is doing better. Denies any chest pain, headaches, dizziness, numbness. Patient states all symptoms he went to the ED for are now resolved. Patient has a f/u appt scheduled with PCP on 1/17/2022. Patient was discharged with no home care. Patient states he has family at home helping him. Patient states no issues with diet and hydration. Patient started baby aspirin 81 mg. Also on warfarin but has been on this for at least 12 years and no issues.            Care Transitions 24 Hour Call    Schedule Follow Up Appointment with PCP: Completed  Do you have any ongoing symptoms?: No  Do you have a copy of your discharge instructions?: Yes  Do you have all of your prescriptions and are they filled?: Yes  Have you been contacted by a [address]?: No  Have you scheduled your follow up appointment?: Yes  How are you going to get to your appointment?: Car - family or friend to transport  Were you discharged with any Home Care or Post Acute Services: No  Do you feel like you have everything you need to keep you well at home?: Yes  Care Transitions Interventions         Follow Up  Future Appointments   Date Time Provider Maurizio Elder   1/14/2022 11:45  So. Zach Aiken   1/17/2022 10:20 AM ZEESHAN Brown Che - CNP [address], RN

## 2022-01-14 ENCOUNTER — ANTI-COAG VISIT (OUTPATIENT)
Dept: PHARMACY | Age: 62
End: 2022-01-14
Payer: COMMERCIAL

## 2022-01-14 DIAGNOSIS — Z79.01 LONG TERM CURRENT USE OF ANTICOAGULANT: ICD-10-CM

## 2022-01-14 DIAGNOSIS — I26.99 PULMONARY EMBOLISM, UNSPECIFIED CHRONICITY, UNSPECIFIED PULMONARY EMBOLISM TYPE, UNSPECIFIED WHETHER ACUTE COR PULMONALE PRESENT (HCC): Primary | ICD-10-CM

## 2022-01-14 LAB — INTERNATIONAL NORMALIZATION RATIO, POC: 2.7

## 2022-01-14 PROCEDURE — 99211 OFF/OP EST MAY X REQ PHY/QHP: CPT | Performed by: PHARMACIST

## 2022-01-14 PROCEDURE — 85610 PROTHROMBIN TIME: CPT | Performed by: PHARMACIST

## 2022-01-14 NOTE — PROGRESS NOTES
Mr. Lainey Soto has hx of multiple PE s/p 11/2009 and 6/2012. 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 He presents today w/out complaint. Pt. denies any s/sx bleeding/bruising/ swelling/SOB. No missed doses. No changes in Rx/OTC/herbal medications. Pt denies EtOH use. Pt was hospitalized this past weekend for mild stroke. Started on aspirin 81 mg daily. INR 2.7 is within therapeutic goal range of 2-3  Recommend to continue 7.5 mg on Thur and 5 mg all other days  Pt has 5 mg tablets  Will continue to monitor and check INR in 4 weeks.   Dosing reminder card given with phone number, appointment date and time  Return to clinic: 2/11 @ 0945 am  Referring provider: Dr. Merlin Merritt, PharmD 10:11 AM EST 1/14/22

## 2022-01-17 ENCOUNTER — OFFICE VISIT (OUTPATIENT)
Dept: INTERNAL MEDICINE CLINIC | Age: 62
End: 2022-01-17
Payer: COMMERCIAL

## 2022-01-17 ENCOUNTER — TELEPHONE (OUTPATIENT)
Dept: INTERNAL MEDICINE CLINIC | Age: 62
End: 2022-01-17

## 2022-01-17 VITALS
BODY MASS INDEX: 27.62 KG/M2 | WEIGHT: 226.8 LBS | SYSTOLIC BLOOD PRESSURE: 128 MMHG | OXYGEN SATURATION: 98 % | HEIGHT: 76 IN | HEART RATE: 81 BPM | DIASTOLIC BLOOD PRESSURE: 74 MMHG

## 2022-01-17 DIAGNOSIS — I63.9 ACUTE CVA (CEREBROVASCULAR ACCIDENT) (HCC): Primary | ICD-10-CM

## 2022-01-17 DIAGNOSIS — E78.2 MIXED HYPERLIPIDEMIA: ICD-10-CM

## 2022-01-17 DIAGNOSIS — Z79.01 LONG TERM CURRENT USE OF ANTICOAGULANT: ICD-10-CM

## 2022-01-17 DIAGNOSIS — G93.49 LEUKOENCEPHALOPATHY: ICD-10-CM

## 2022-01-17 DIAGNOSIS — I10 HTN (HYPERTENSION), BENIGN: ICD-10-CM

## 2022-01-17 DIAGNOSIS — E87.1 ACUTE HYPONATREMIA: ICD-10-CM

## 2022-01-17 DIAGNOSIS — Z23 NEED FOR TDAP VACCINATION: ICD-10-CM

## 2022-01-17 PROCEDURE — 1111F DSCHRG MED/CURRENT MED MERGE: CPT | Performed by: NURSE PRACTITIONER

## 2022-01-17 PROCEDURE — 90715 TDAP VACCINE 7 YRS/> IM: CPT | Performed by: NURSE PRACTITIONER

## 2022-01-17 PROCEDURE — 99496 TRANSJ CARE MGMT HIGH F2F 7D: CPT | Performed by: NURSE PRACTITIONER

## 2022-01-17 PROCEDURE — 90471 IMMUNIZATION ADMIN: CPT | Performed by: NURSE PRACTITIONER

## 2022-01-17 SDOH — ECONOMIC STABILITY: FOOD INSECURITY: WITHIN THE PAST 12 MONTHS, YOU WORRIED THAT YOUR FOOD WOULD RUN OUT BEFORE YOU GOT MONEY TO BUY MORE.: NEVER TRUE

## 2022-01-17 SDOH — ECONOMIC STABILITY: FOOD INSECURITY: WITHIN THE PAST 12 MONTHS, THE FOOD YOU BOUGHT JUST DIDN'T LAST AND YOU DIDN'T HAVE MONEY TO GET MORE.: NEVER TRUE

## 2022-01-17 ASSESSMENT — PATIENT HEALTH QUESTIONNAIRE - PHQ9
SUM OF ALL RESPONSES TO PHQ QUESTIONS 1-9: 0
2. FEELING DOWN, DEPRESSED OR HOPELESS: 0
SUM OF ALL RESPONSES TO PHQ9 QUESTIONS 1 & 2: 0
SUM OF ALL RESPONSES TO PHQ QUESTIONS 1-9: 0
1. LITTLE INTEREST OR PLEASURE IN DOING THINGS: 0
SUM OF ALL RESPONSES TO PHQ QUESTIONS 1-9: 0
SUM OF ALL RESPONSES TO PHQ QUESTIONS 1-9: 0

## 2022-01-17 ASSESSMENT — ENCOUNTER SYMPTOMS
CHEST TIGHTNESS: 0
COUGH: 0
CONSTIPATION: 0
SINUS PAIN: 0
SORE THROAT: 0
NAUSEA: 0
SHORTNESS OF BREATH: 0
DIARRHEA: 0
VOMITING: 0

## 2022-01-17 ASSESSMENT — SOCIAL DETERMINANTS OF HEALTH (SDOH): HOW HARD IS IT FOR YOU TO PAY FOR THE VERY BASICS LIKE FOOD, HOUSING, MEDICAL CARE, AND HEATING?: NOT HARD AT ALL

## 2022-01-17 NOTE — PROGRESS NOTES
Post-Discharge Transitional Care Management Services or Hospital Follow Up    Emily Hammond   YOB: 1960    Date of Office Visit:  1/17/2022  Date of Hospital Admission: 1/7/22  Date of Hospital Discharge: 1/9/22  Readmission Risk Score(high >=14%. Medium >=10%):Readmission Risk Score: 7.3 ( )    Care management risk score Rising risk (score 2-5) and Complex Care (Scores >=6): 4     Non face to face  following discharge, date last encounter closed (first attempt may have been earlier): 1/11/2022  1:00 PM 1/11/2022  1:00 PM    Call initiated 2 business days of discharge: Yes     Patient Active Problem List   Diagnosis    H/o Pulmonary embolus    Long term current use of anticoagulant    Personal history of DVT (deep vein thrombosis)    Chest pain    Mixed hyperlipidemia    Abnormal stress test    Syncope and collapse    Bone spur of right foot    Cellulitis and abscess of foot, except toes    Foot ulcer ([address]    Gastroesophageal reflux disease without esophagitis    Subdural hematoma ([address]    Leukoencephalopathy    Former smoker    Acute hyponatremia    Hypomagnesemia    Acute focal neurological deficit    Cerebrovascular accident (CVA) ([address]    HTN (hypertension), benign    PAF (paroxysmal atrial fibrillation) ([address]       Allergies   Allergen Reactions    Bee Venom Anaphylaxis     Has epi pen    Nutritional Supplements Anaphylaxis     Pt states he is not allergic to nutritional supplements    Penicillins Hives       Medications listed as ordered at the time of discharge from hospital     Medication List          Accurate as of January 17, 2022 12:35 PM. If you have any questions, ask your nurse or doctor.             CONTINUE taking these medications    aspirin 81 MG EC tablet  Take 1 tablet by mouth daily     atorvastatin 40 MG tablet  Commonly known as: LIPITOR  TAKE ONE TABLET BY MOUTH NIGHTLY     omeprazole 10 MG delayed release capsule  Commonly known as: PRILOSEC warfarin 5 MG tablet  Commonly known as: COUMADIN  Take as directed by the anticoagulation clinic. If you are unsure how to take this medication, talk to your nurse or doctor. Medications marked \"taking\" at this time  Outpatient Medications Marked as Taking for the 1/17/22 encounter (Office Visit) with ZEESHAN Schneider CNP   Medication Sig Dispense Refill    aspirin 81 MG EC tablet Take 1 tablet by mouth daily 30 tablet 3    atorvastatin (LIPITOR) 40 MG tablet TAKE ONE TABLET BY MOUTH NIGHTLY 90 tablet 3    warfarin (COUMADIN) 5 MG tablet       omeprazole (PRILOSEC) 10 MG delayed release capsule Take 10 mg by mouth daily          Medications patient taking as of now reconciled against medications ordered at time of hospital discharge: Yes    Chief Complaint   Patient presents with    Follow-Up from Hospital     mild stroke. needs script for PT     Annual Exam    Other     [facility] clinic        Mr. Janina Foster presents after recent hospitalization for acute CVA like symptoms. He states he noticed left sided weakness and dysphonia which became progressively worse starting in the early afternoon. He states he called 911 and was assessed by the fire department/paramedics. He was brought to the ER by his wife. He received urgent imaging which showed chronic changes from subacute brain hemorrhage recovery. He states he did notice headaches at one point after his original fall which lead to the hemorrhage, however they have since resolved. There was noted to be significant electrolyte abnormalities on screening and thusly were corrected. He states this helped him to feel significantly better as well. Currently he states he is recovering well. Notes extremely mild residual symptoms in his left hand, however since being home this has recovered well. Continues with INR management. Recent INR 2.7. Has started ASA per neurology recommendations.  Continues on high intensity statin; lipids well controlled. Continues to work one day a week with the [employer]. Enjoys being outdoors. Inpatient course: Discharge summary reviewed- see chart. Interval history/Current status: as above    Review of Systems   Constitutional: Negative for fever. HENT: Negative for sinus pain and sore throat. Respiratory: Negative for cough, chest tightness and shortness of breath. Cardiovascular: Negative for chest pain and palpitations. Gastrointestinal: Negative for constipation, diarrhea, nausea and vomiting. Genitourinary: Negative for dysuria and urgency. Skin: Negative for rash. Neurological: Negative for dizziness and weakness. Vitals:    01/17/22 0943   BP: 128/74   Site: Right Upper Arm   Position: Sitting   Cuff Size: Large Adult   Pulse: 81   SpO2: 98%   Weight: 226 lb 12.8 oz (102.9 kg)   Height: 6' 3.75\" (1.924 m)     Body mass index is 27.79 kg/m². Wt Readings from Last 3 Encounters:   01/17/22 226 lb 12.8 oz (102.9 kg)   01/09/22 216 lb 11.2 oz (98.3 kg)   01/25/21 212 lb (96.2 kg)     BP Readings from Last 3 Encounters:   01/17/22 128/74   01/09/22 124/85   01/25/21 110/72     Physical Exam  Vitals and nursing note reviewed. Constitutional:       Appearance: Normal appearance. He is well-developed. HENT:      Head: Normocephalic and atraumatic. Right Ear: Hearing and external ear normal.      Left Ear: Hearing and external ear normal.      Nose: Nose normal.   Eyes:      General: Lids are normal.      Pupils: Pupils are equal, round, and reactive to light. Cardiovascular:      Rate and Rhythm: Normal rate. Pulses: Normal pulses. Pulmonary:      Effort: Pulmonary effort is normal. No accessory muscle usage or respiratory distress. Abdominal:      General: Bowel sounds are normal.      Palpations: Abdomen is soft. Musculoskeletal:      Cervical back: Normal range of motion. Skin:     General: Skin is warm and dry.    Neurological:      Mental Status: He is alert and oriented to person, place, and time. Cranial Nerves: Dysarthria (Mild stumbling of words, not far from baseline, however) present. Sensory: Sensation is intact. Motor: Motor function is intact. Coordination: Coordination is intact. Psychiatric:         Speech: Speech normal.         Assessment/Plan:  1. Acute CVA (cerebrovascular accident) ([address]  3. HTN (hypertension), benign  5. Mixed hyperlipidemia  6. Long term current use of anticoagulant  - CT DISCHARGE MEDS RECONCILED W/ CURRENT OUTPATIENT MED LIST  - [facility] - Jessie Booker well. Continue with PT for rehab purposes. Continue ASA + coumadin for more complete antiplatelet properties. Recommend daily physical activity to promote overall health. BP well controlled. 4. Leukoencephalopathy    Likely secondary to recovery from recent fall/intracranial bleeding. Continue to monitor. Possible neurology referral.     2. Acute hyponatremia    Corrected. Can continue electrolyte drink daily in addition to regular diet. Encouraged ~60-70 oz water daily.      7. Need for Tdap vaccination    - Tdap (age 6y and older) IM (Boostrix)        Medical Decision Making: high complexity

## 2022-01-17 NOTE — TELEPHONE ENCOUNTER
Patient calling back with information for [employer] duty letter to be excused from duty.    Please send to [facility] [place] number [phone]   Juror number [id number]

## 2022-01-17 NOTE — LETTER
[facility] CARRIE Aguirre [address] [facility]  Phone: [phone]  Fax: [phone]      Myer Cogan, APRN - CNP      January 17, 2022     Patient: Daphney Perez   YOB: 1960   Date of Visit: 1/17/2022       To Whom It May Concern: It is my medical opinion that Daphney Perez should be excluded from jury duty as he recently suffered a stroke. He requires physical therapy in order to strengthen residual weakness after the stroke and I feel this would be harmful to his recovery. If you have any questions or concerns, please don't hesitate to call.     Sincerely,        Myer Cogan, APRN - CNP

## 2022-01-18 ENCOUNTER — CARE COORDINATION (OUTPATIENT)
Dept: OTHER | Facility: CLINIC | Age: 62
End: 2022-01-18

## 2022-01-18 NOTE — CARE COORDINATION
Care Transitions Outreach Attempt    Call within 2 business days of discharge: No   Attempted to reach patient for transitions of care follow up. Unable to reach patient. Patient: Caesar Rooney Patient : 1960 MRN: [id number]    Last Discharge [facility]       Complaint Diagnosis Description Type Department Provider    22 Dizziness Cerebrovascular accident (CVA), unspecified mechanism ([facility] [address] [facility] . .. ED to Hosp-Admission (Discharged) (ADMITTED) [place] [address] Bethel Tian MD; [place]. .. Was this an external facility discharge?  No Discharge Facility: [facility]    Noted following upcoming appointments from discharge chart review:   [facility] follow up appointment(s):   Future Appointments   Date Time Provider Maurizio Elder   2022  9:45 AM [address]     Non-[facility] follow up appointment(s):

## 2022-01-26 ENCOUNTER — CARE COORDINATION (OUTPATIENT)
Dept: OTHER | Facility: CLINIC | Age: 62
End: 2022-01-26

## 2022-01-26 NOTE — CARE COORDINATION
[facility] Transitions Follow Up Call    2022    Patient: Radha Maldonado  Patient : 1960   MRN: [id number]  Reason for Admission: CVA, hypokalemia, hyponatremia, hypomagnesemia, hypocalcemia  Discharge Date: 22 RARS: Readmission Risk Score: 7.3 ( )         Care Transitions Follow Up Call    Needs to be reviewed by the provider   Additional needs identified to be addressed with provider: No  none             Method of communication with provider : none      Care Transition Nurse (CTN) contacted the patient by telephone to follow up after admission on 2022. Verified name and  with patient as identifiers. Addressed changes since last contact: patient followed up with PCP on 2022. PCP gave patient a referral for PT. states he will start this in a few weeks. patient does not have this scheduled yet but states he will call today or tomorrow to schedule. patient states he is doing great. blood pressure is good. reports no symptoms. Discussed follow-up appointments. If no appointment was previously scheduled, appointment scheduling offered: Yes. Is follow up appointment scheduled within 7 days of discharge? No.    Advance Care Planning:   Does patient have an Advance Directive: not discussed. CTN reviewed discharge instructions, medical action plan and red flags with patient and discussed any barriers to care and/or understanding of plan of care after discharge. Discussed appropriate site of care based on symptoms and resources available to patient including: PCP, Specialist, Benefits related nurse triage line, Urgent [facility] clinics, [facility], When to call 911 and [address]. The patient agrees to contact the PCP office for questions related to their healthcare.      Patients top risk factors for readmission: medical condition-CVA  Interventions to address risk factors: Obtained and reviewed discharge summary and/or continuity of care documents and Education of patient/family/caregiver/guardian to support self-management-discussed red flags in detail      Non-[facility] follow up appointment(s):     CTN provided contact information for future needs. Plan for follow-up call in 10-14 days based on severity of symptoms and risk factors. Plan for next call: PT progress        Care Transitions Subsequent and Final Call    Schedule Follow Up Appointment with PCP: Completed  Subsequent and Final Calls  Do you have any ongoing symptoms?: No  Have your medications changed?: No  Do you have any questions related to your medications?: No  Do you currently have any active services?: No  Do you have any needs or concerns that I can assist you with?: No  Identified Barriers: None  Care Transitions Interventions  Other Interventions:            Follow Up  Future Appointments   Date Time Provider Maurizio Elder   2/2/2022 10:45 AM [facility] Channing Ashley   2/11/2022  9:45 AM [address] [facility]       Charlie Kent, RN

## 2022-02-02 ENCOUNTER — HOSPITAL ENCOUNTER (OUTPATIENT)
Dept: PHYSICAL THERAPY | Age: 62
Setting detail: THERAPIES SERIES
Discharge: HOME | End: 2022-02-02
Payer: COMMERCIAL

## 2022-02-02 PROCEDURE — 97161 PT EVAL LOW COMPLEX 20 MIN: CPT

## 2022-02-02 PROCEDURE — 97110 THERAPEUTIC EXERCISES: CPT

## 2022-02-02 NOTE — PROGRESS NOTES
BRADY BALANCE SCALE 14-Item Long Form Original Version    Patient Name:  Ronit Redd  Date:  2/2/2022    1. SITTING TO STANDING  INSTRUCTIONS: Please stand up. Try not to use your hands for support. [x]4 able to stand without using hands and stabilize independently  []3 able to stand independently using hands  []2 able to stand using hands after several tries  []1 needs minimal aid to stand or to stabilize  []0 needs moderate or maximal assist to stand  Score:     2. STANDING UNSUPPORTED  INSTRUCTIONS: Please stand for two minutes without holding. [x]4 able to stand safely 2 minutes  []3 able to stand 2 minutes with supervision  []2 able to stand 30 seconds unsupported  []1 needs several tries to stand 30 seconds unsupported  []0 unable to stand 30 seconds unassisted If a subject is able to stand 2  minutes unsupported, score full points for sitting unsupported. Proceed to  item #4. Score:     3. SITTING WITH BACK UNSUPPORTED BUT FEET SUPPORTED  ON FLOOR OR ON A STOOL  INSTRUCTIONS: Please sit with arms folded for 2 minutes. [x]4 able to sit safely and securely 2 minutes  []3 able to sit 2 minutes under supervision  []2 able to sit 30 seconds  []1 able to sit 10 seconds  []0 unable to sit without support 10 seconds  Score:      4. STANDING TO SITTING  INSTRUCTIONS: Please sit down. [x]4 sits safely with minimal use of hands  []3 controls descent by using hands  []2 uses back of legs against chair to control descent  []1 sits independently but has uncontrolled descent  []0 needs assistance to sit  Score:     5. TRANSFERS  INSTRUCTIONS: Arrange chairs(s) for a pivot transfer. Ask subject to  transfer one way toward a seat with armrests and one way toward a seat  without armrests. You may use two chairs (one with and one without  armrests) or a bed and a chair.   [x]4 able to transfer safely with minor use of hands  []3 able to transfer safely definite need of hands  []2 able to transfer with verbal cueing and/or supervision  []1 needs one person to assist  []0 needs two people to assist or supervise to be safe  Score:     6. STANDING UNSUPPORTED WITH EYES CLOSED  INSTRUCTIONS: Please close your eyes and stand still for 10 seconds. []4 able to stand 10 seconds safely  [x]3 able to stand 10 seconds with supervision  []2 able to stand 3 seconds  []1 unable to keep eyes closed 3 seconds but stays steady  []0 needs help to keep from falling  Score:     7. STANDING UNSUPPORTED WITH FEET TOGETHER  INSTRUCTIONS: Place your feet together and stand without holding. [x]4 able to place feet together independently and stand 1 minute safely  []3 able to place feet together independently and stand for 1 minute with  supervision  []2 able to place feet together independently but unable to hold for 30 seconds  []1 needs help to attain position but able to stand 15 seconds feet together  []0 needs help to attain position and unable to hold for 15 seconds  Score:     8. REACHING FORWARD WITH OUTSTRETCHED ARM WHILE  STANDING  INSTRUCTIONS: Lift arm to 90 degrees. Stretch out your fingers and reach  forward as far as you can. (Examiner places a ruler at end of fingertips when  arm is at 90 degrees. Fingers should not touch the ruler while reaching  forward. The recorded measure is the distance forward that the finger reaches  while the subject is in the most forward lean position. When possible, ask  subject to use both arms when reaching to avoid rotation of the trunk.). []4 can reach forward confidently >25 cm (10 inches)  []3 can reach forward >12 cm safely (5 inches)  [x]2 can reach forward >5 cm safely (2 inches)  []1 reaches forward but needs supervision  []0 loses balance while trying/requires external support  Score:     9.  OBJECT FROM FLOOR FROM A STANDING POSITION  INSTRUCTIONS:  shoe/slipper which is placed in front of your feet.   [x]4 able to  slipper safely and easily  []3 able to  slipper but far enough ahead that the heel of your forward foot is ahead of the  toes of the other foot. (To score 3 points, the length of the step should exceed  the length of the other foot and the width of the stance should approximate the  subject's normal stride width). []4 able to place foot tandem independently and hold 30 seconds  []3 able to place foot ahead of other independently and hold 30 seconds  []2 able to take small step independently and hold 30 seconds  []1 needs help to step but can hold 15 seconds  [x]0 loses balance while stepping or standing  Score:     14. STANDING ON ONE LEG  INSTRUCTIONS: Stand on one leg as long as you can without holding. []4 able to lift leg independently and hold >10 seconds  [x]3 able to lift leg independently and hold 5-10 seconds  []2 able to lift leg independently and hold = or >3 seconds  []1 tries to lift leg unable to hold 3 seconds but remains standing  independently  []0 unable to try or needs assist to prevent fall  Score: Total Score:   48  Max score 56,a person scoring below 45 is considered to be at risk for falling.     Completed by: Lydia Rivera PT    G-Code [place]:  Wen Total Score Disability Index CMS Modifier   56 0% []CH   45-55 1-19% []CI   34-44 20-39% []CJ   23-33 40-59% []CK   12-22 60-79% []CL   1-11 80-99% []CM   0 100% []CN

## 2022-02-02 NOTE — PROGRESS NOTES
Outpatient Physical Therapy  Phone: [phone] Fax: [phone]     To: MARY Bautista       From: Elvira Pastrana PT    Patient: Yola Cary          : 1960  Diagnosis:  I63.9         Date: 2022  Treatment Diagnosis:  Decreased balance and gt after CVA    Physical Therapy Certification/Re-Certification Form   Dear MARY Bautista,  The following patient has been evaluated for physical therapy services and for therapy to continue, Medicare requires monthly physician review of the treatment plan. Please review the attached evaluation and/or summary of the patient's plan of care, and verify that you agree therapy should continue by signing the attached document and sending it back to our office. Plan of Care/Treatment to date:  [] Therapeutic Exercise   [] Modalities:  [] Therapeutic Activity     [] Ultrasound  [] Electrical Stimulation   [x] Gait Training      [] Cervical Traction [] Lumbar Traction  [x] Neuromuscular Re-education  [] Hot/Coldpack [] Iontophoresis    [x] Instruction in HEP      Other:  [] Manual Therapy       []                        [] Aquatic Therapy       []                      ? Frequency/Duration:  # Days per week: [] 1 day # Weeks: [] 1 week [] 5 weeks      [x] 2 days? [] 2 weeks [x] 6 weeks     [] 3 days   [] 3 weeks [] 7 weeks     [] 4 days   [] 4 weeks [] 8 weeks    Rehab Potential: [] Excellent [x] Good [] Fair  [] Poor       Electronically signed by:  Elvira Pastrana PT        If you have any questions or concerns, please don't hesitate to call.   Thank you for your referral.    Physician Signature:________________________________Date:__________________  By signing above, therapists plan is approved by physician

## 2022-02-02 NOTE — FLOWSHEET NOTE
[address] [place] [address] and Therapy[address]  Phone: [phone]  Fax [phone]    Physical Therapy Daily Treatment Note    Date:  2022    Patient Name:  Carline Garza    :  1960  MRN: [id number]  Restrictions/Precautions:    Medical/Treatment Diagnosis Information:  · Diagnosis: I63.9 acute CVA  Insurance/Certification information:  PT Insurance Information: BCBS -- 27 combined therapy visits per year  Physician Information:  Referring Practitioner: MARY Bishop  Plan of care signed (Y/N):  sent  Visit# / total visits:  -     G-Code (if applicable):              Time in:   10:27      Timed Treatment: 8  Total Treatment Time:  49  Time out: 11:18  ________________________________________________________________________________________    Pain Level:   5/10 low back, neck  SUBJECTIVE:  See eval    OBJECTIVE: see eval    Exercise/Equipment Resistance/Repetitions Other comments     Stance on  roll nv      Tandem stance nv      Stair training nv      LSU nv    Toe taps with cones nv    Lat amb in // bars  Backward amb in // bars nv  nv      Mini squats on airex nv      Stance with eyes closed nv Progress to adding head turns/ nods, sit <-> stand                                                                                     Other Therapeutic Activities:  Eval completed, HEP issued and practiced    HEP:  FSU, toe taps, SLS  [id number]    Manual Treatments:         Modalities:      Test/Measurements:         ASSESSMENT:      See eval    Treatment/Activity Tolerance:   [x]Patient tolerated treatment well [] Patient limited by fatique  []Patient limited by pain [] Patient limited by other medical complications  [] Other:     Goals:    Short term goals  Time Frame for Short term goals: 3 weeks  Short term goal 1: Pt will be ind and compliant with HEP  Short term goal 2: Pt will stand with eyes closed x30\" with minimal sway  Short term goal 3: Pt will navigate 3 stairs x4 with normal speed without misstep          Plan: [] Continue per plan of care [] Alter current plan (see comments)   [x] Plan of care initiated [] Hold pending MD visit [] Discharge      Plan for Next Session:      Re-Certification Due Date:         Signature:  Cinthya Canales PT

## 2022-02-02 NOTE — PROGRESS NOTES
Physical Therapy  Initial Assessment  Date: 2022  Patient Name: Misti Herrera  MRN: [id number]  : 1960        Subjective   General  Chart Reviewed: Yes  Patient assessed for rehabilitation services?: Yes  Additional Pertinent Hx: DVT, PE, syncope & collapse, a-fib, Htn, R thoracotomy   Family / Caregiver Present: No  Referring Practitioner: MARY Chen  Referral Date : 22  Diagnosis: I63.9 acute CVA  General Comment  Comments: PLOF:  active with walking. Works for [employer] seasonally (currently one day per week)  PT Visit Information  Onset Date: 22  PT Insurance Information: [facility] -- 30 combined therapy visits per year  Subjective  Subjective: Pt had CVA on  with L-sided weakness. Having some residual minor balance issues with walking, stating he will veer to his L side. Voices fear of falling down stairs so he currently avoids them at home (All needs on first floor. ). Wants to be able to walk normally again. C/o LBP and neck stiffness, which he states he has had for 11 years. States this is his CC but not interested in PT for it at this time.           Objective     Observation/Palpation  Posture: Fair  Palpation: TTP along L lumbar paraspinals  Observation: c/o L-sided low back pain with prone lying and with standing L rotation    AROM RLE (degrees)  RLE AROM: WNL  AROM LLE (degrees)  LLE AROM : WNL    Strength RLE  R Hip Flexion: 5/5  R Hip Extension: 5/5  R Hip ABduction: 5/5  R Hip Internal Rotation: 5/5  R Hip External Rotation: 5/5  R Knee Flexion: 5/5  R Knee Extension: 5/5  R Ankle Dorsiflexion: 5/5  R Ankle Plantar flexion: 5/5  R Ankle Inversion: 5/5  R Ankle Eversion: 5/5  Strength LLE  L Hip Flexion: 5/5  L Hip Extension: 5/5  L Hip ABduction: 5/5  L Hip Internal Rotation: 5/5  L Hip External Rotation: 5/5  L Knee Flexion: 5/5  L Knee Extension: 5/5  L Ankle Dorsiflexion: 5/5  L Ankle Plantar Flexion: 5/5  L Ankle Inversion: 5/5  L Ankle Eversion: 5/5  Strength RUE  Strength RUE: WNL  Strength LUE  Strength LUE: WNL  Strength Other  Other: PGM 4+/5 B     Additional Measures  Special Tests: (-) Canales's, clonus, Hautard's  Other: LE DTRs 2+ B  Sensation  Light Touch: Partial deficits in the LLE (L3 and L5 dermatomes)             Ambulation  Ambulation?: Yes  Ambulation 1  Quality of Gait: No lateral deviations noted in clinic, but pt amb slowly with wide MCKENNA. Significant diffiiculty with heel/ toe walking. Gait Deviations: Slow Fawn; Increased MCKENNA; Decreased step length  Stairs/Curb  Stairs?: Yes  Stairs  # Steps : 3  Stairs Height: 6\"  Rails: Bilateral  Assistance: Supervision  Comment: One misstep when descending with LLE (overstepped, landing with L heel only on the step). Ascended and descended very slowly. Balance  Sitting - Static: Good  Sitting - Dynamic: Good  Standing - Static: Fair  Standing - Dynamic: Fair  Tandem Stance R Le  Tandem Stance L Le  Single Leg Stance R Leg: 10  Single Leg Stance L Leg: 10  Comments: Wen Balance Test:  48/56  Foam Surface  Sway: Minimal  Sway: Moderate  Romberg/Narrow Base of Support  Sway: Minimal  Sway: Moderate  Functional Reach Test  Functional Reach Test: 4 inches                         Assessment   Conditions Requiring Skilled Therapeutic Intervention  Body structures, Functions, Activity limitations: Increased pain;Decreased ADL status; Decreased balance;Decreased coordination  Assessment: Pt presents with mild balance deficits after CVA:  balance with eyes closed, balance with narrow MCKENNA; as well as gt deficits. Anticipate return to PLOF with skilled PT.   Prognosis: Good  Decision Making: Low Complexity  REQUIRES PT FOLLOW UP: Yes  Activity Tolerance  Activity Tolerance: Patient Tolerated treatment well         Plan   Plan  Times per week: 2x/wk x6 wks  Current Treatment Recommendations: Neuromuscular Re-education,Home Exercise Program,Balance Training,Gait Training,Stair training    G-Code   NDI 10% disability, DAMIEN 10% disability           Goals  Short term goals  Time Frame for Short term goals: 3 weeks  Short term goal 1: Pt will be ind and compliant with HEP  Short term goal 2: Pt will stand with eyes closed x30\" with minimal sway  Short term goal 3: Pt will navigate 3 stairs x4 with normal speed without misstep  Long term goals  Time Frame for Long term goals : 6 wks  Long term goal 1: Pt will amb with normal gt pattern at a functional speed  Long term goal 2: Pt will return to navigating his stairs at home  Long term goal 3: Pt will increase functional reach to 10 inches  Long term goal 4: Pt will  tandem x30\" B with minimal to no sway  Patient Goals   Patient goals : walk normally           Opal Ware, PT

## 2022-02-08 ENCOUNTER — HOSPITAL ENCOUNTER (OUTPATIENT)
Dept: PHYSICAL THERAPY | Age: 62
Setting detail: THERAPIES SERIES
Discharge: HOME | End: 2022-02-08
Payer: COMMERCIAL

## 2022-02-08 PROCEDURE — 97112 NEUROMUSCULAR REEDUCATION: CPT

## 2022-02-08 PROCEDURE — 97116 GAIT TRAINING THERAPY: CPT

## 2022-02-08 NOTE — FLOWSHEET NOTE
[address] [place] [address] [facility] [address] [place] [address]  Phone: [phone]  Fax [phone]    Physical Therapy Daily Treatment Note    Date:  2022    Patient Name:  Teresa Acosta    :  1960  MRN: [id number]  Restrictions/Precautions:    Medical/Treatment Diagnosis Information:  · Diagnosis: I63.9 acute CVA  Insurance/Certification information:  PT Insurance Information: [employer] -- 27 combined therapy visits per year  Physician Information:  Referring Practitioner: MARY Ibanez  Plan of care signed (Y/N):  sent  Visit# / total visits:  -     G-Code (if applicable):              Time in:   11:33      Timed Treatment: 41  Total Treatment Time:  41  Time out: 12:14  ________________________________________________________________________________________    Pain Level:   0/10 low back, neck  SUBJECTIVE:  Compliant with HEP    OBJECTIVE: much better performance of balancing with eyes closed than when assessed LV    Exercise/Equipment Resistance/Repetitions Other comments     Stance on 1/2 roll Blue x1'  White x1'      Tandem stance 2x30\" B Challenged with LLE forward     Stair training 3 steps, x4 up and down      LSU 6\" 2x10 B    Toe taps with 2 cones 1'x2 Taps to ipsilateral cone.   Relied on 1 bar for balance   Lat amb in // bars  Backward amb in // bars x5 laps  x3 laps With TB nv  With some LBP     Mini squats on airex 2x10      Stance with eyes closed X30\" stationary  X30\" with neck rotation  X30\" with neck flex/ext Progress to adding airex, sit <-> stand     Resisted gt with orange SC x3 laps fwd & bkwd Minor LOB with backward, with min assist to correcrt   SLS 2x30\" B                                                                      Other Therapeutic Activities:       HEP:  FSU, toe taps, SLS  [id number]    Manual Treatments:         Modalities:      Test/Measurements:         ASSESSMENT:      noa session well without significant LOB.   SBA for all ex with the exception of resisted gt (CGA via belt)    Treatment/Activity Tolerance:   [x]Patient tolerated treatment well [] Patient limited by fatique  []Patient limited by pain [] Patient limited by other medical complications  [] Other:     Goals:    Short term goals  Time Frame for Short term goals: 3 weeks  Short term goal 1: Pt will be ind and compliant with HEP  Short term goal 2: Pt will stand with eyes closed x30\" with minimal sway  Short term goal 3: Pt will navigate 3 stairs x4 with normal speed without misstep          Plan: [x] Continue per plan of care [] Alter current plan (see comments)   [] Plan of care initiated [] Hold pending MD visit [] Discharge      Plan for Next Session:      Re-Certification Due Date:         Signature:  Santiago Handley, PT

## 2022-02-10 ENCOUNTER — CARE COORDINATION (OUTPATIENT)
Dept: OTHER | Facility: CLINIC | Age: 62
End: 2022-02-10

## 2022-02-10 NOTE — CARE COORDINATION
Care Transitions Outreach Attempt    Call within 2 business days of discharge: No   Attempted to reach patient for transitions of care follow up. Unable to reach patient. Patient: Emily Hammond Patient : 1960 MRN: [id number]    Last Discharge [facility]       Complaint Diagnosis Description Type Department Provider    22 Dizziness Cerebrovascular accident (CVA), unspecified mechanism ([address] . .. ED to Hosp-Admission (Discharged) (ADMITTED) BITA Haynes MD; Tegan Suarez. .. Was this an external facility discharge?  No Discharge Facility: [facility]    Noted following upcoming appointments from discharge chart review:   [facility] follow up appointment(s):   Future Appointments   Date Time Provider Maurizio Elder   2022  9:45 AM [address] [place]   2022 10:00 AM Sarah Douglas, PT MHAZ PT [facility]   2022  9:15 AM Sarah Douglas, PT BITA PT [facility]   2022  9:15 AM Sarah Douglas, PT BITA PT [facility]   2022  9:15 AM Sarah Douglas, PT BITA PT [facility]   2022  9:15 AM Sarah Douglas, PT BITA PT [facility]   2022  9:15 AM Sarah Douglas, PT MHAZ PT [facility]   3/4/2022  9:15 AM Sarah Douglas, PT BITA PT [facility] H[facility]     Non-[facility] follow up appointment(s):

## 2022-02-11 ENCOUNTER — ANTI-COAG VISIT (OUTPATIENT)
Dept: PHARMACY | Age: 62
End: 2022-02-11
Payer: COMMERCIAL

## 2022-02-11 ENCOUNTER — HOSPITAL ENCOUNTER (OUTPATIENT)
Dept: PHYSICAL THERAPY | Age: 62
Setting detail: THERAPIES SERIES
Discharge: HOME | End: 2022-02-11
Payer: COMMERCIAL

## 2022-02-11 DIAGNOSIS — Z79.01 LONG TERM CURRENT USE OF ANTICOAGULANT: Primary | ICD-10-CM

## 2022-02-11 LAB — INR BLD: 2.5

## 2022-02-11 PROCEDURE — 85610 PROTHROMBIN TIME: CPT

## 2022-02-11 PROCEDURE — 99211 OFF/OP EST MAY X REQ PHY/QHP: CPT

## 2022-02-11 PROCEDURE — 97112 NEUROMUSCULAR REEDUCATION: CPT

## 2022-02-11 NOTE — FLOWSHEET NOTE
[facility] and Therapy, [address]  Phone: [phone]  Fax [phone]    Physical Therapy Daily Treatment Note    Date:  2022    Patient Name:  Beau Aleman    :  1960  MRN: [id number]  Restrictions/Precautions:    Medical/Treatment Diagnosis Information:  · Diagnosis: I63.9 acute CVA  Insurance/Certification information:  PT Insurance Information: [employer] -- 27 combined therapy visits per year  Physician Information:  Referring Practitioner: MARY Merino  Plan of care signed (Y/N):  sent  Visit# / total visits:  3/8-     G-Code (if applicable):              Time in:   10:02     Timed Treatment: 42  Total Treatment Time:  42  Time out: 10:44  ________________________________________________________________________________________    Pain Level:   0/10 low back, neck  SUBJECTIVE:  No c/o from LV. States stairs are going better. OBJECTIVE: demonstrated improved tandem stance    Exercise/Equipment Resistance/Repetitions Other comments     Stance on 1/ roll White x2' Rocker board nv     Tandem stance 2x30\" B  On airex x30\" B     Stair training 3 steps, x8 up and down      LSU 6\" 2x12 B    Toe taps with 2 cones 1'x2 Taps to ipsilateral cone. Add contralateral nv.      Lat amb in // bars  Backward amb in // bars x5 laps   With lime green band around thighs  With some LBP     Mini squats on airex 2x12      Stance on airex X30\" stationary with eyes closed  X30\" with neck rotation, eyes open then closed  X30\" with neck flex/ext, eyes open then closed Progress to adding airex, sit <-> stand          Resisted gt with orange SC x4 laps fwd & bkwd Minor LOB with backward, with min assist to correcrt   SLS  on airex nv       Hip stacking on 8# ball 2x30\" B      amb with head nods and turns x1' each                                                       Other Therapeutic Activities:       HEP:  FSU, toe taps, SLS, tandem stance  [id number]    Manual Treatments:         Modalities:      Test/Measurements:         ASSESSMENT:      noa progressions well without significant LOB.   SBA for all ex with the exception of resisted gt (CGA via belt)    Treatment/Activity Tolerance:   [x]Patient tolerated treatment well [] Patient limited by fatique  []Patient limited by pain [] Patient limited by other medical complications  [] Other:     Goals:    Short term goals  Time Frame for Short term goals: 3 weeks  Short term goal 1: Pt will be ind and compliant with HEP  Short term goal 2: Pt will stand with eyes closed x30\" with minimal sway  Short term goal 3: Pt will navigate 3 stairs x4 with normal speed without misstep          Plan: [x] Continue per plan of care [] Alter current plan (see comments)   [] Plan of care initiated [] Hold pending MD visit [] Discharge      Plan for Next Session:      Re-Certification Due Date:         Signature:  Ena Lucas, PT

## 2022-02-11 NOTE — PROGRESS NOTES
Mr. Lainey Soto has hx of multiple PE s/p 11/2009 and 6/2012. 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 He presents today w/out complaint. Pt. denies any s/sx bleeding/bruising/ swelling/SOB. No missed doses. No changes in Rx/OTC/herbal medications. Pt denies EtOH use. Pt reports no changes to medications/diet. Still taking Aspirin 81 mg daily. INR 2.5 is within therapeutic goal range of 2-3  Recommend to continue 7.5 mg on Thur and 5 mg all other days  Pt has 5 mg tablets  Will continue to monitor and check INR in 4 weeks.   Dosing reminder card given with phone number, appointment date and time  Return to clinic: 3/11 @ 0945 am  Referring provider: Dr. Merlin Merritt, PharmD 10:11 AM EST 1/14/22

## 2022-02-14 ENCOUNTER — HOSPITAL ENCOUNTER (OUTPATIENT)
Dept: PHYSICAL THERAPY | Age: 62
Setting detail: THERAPIES SERIES
Discharge: HOME | End: 2022-02-14
Payer: COMMERCIAL

## 2022-02-14 PROCEDURE — 97112 NEUROMUSCULAR REEDUCATION: CPT

## 2022-02-14 NOTE — FLOWSHEET NOTE
Therapeutic Activities:       HEP:  FSU, toe taps, SLS, tandem stance  [id number]    Manual Treatments:         Modalities:      Test/Measurements:         ASSESSMENT:      noa progressions well without significant LOB. Progressing as expected.     Treatment/Activity Tolerance:   [x]Patient tolerated treatment well [] Patient limited by fatique  []Patient limited by pain [] Patient limited by other medical complications  [] Other:     Goals:    Short term goals  Time Frame for Short term goals: 3 weeks  Short term goal 1: Pt will be ind and compliant with HEP (met)  Short term goal 2: Pt will stand with eyes closed x30\" with minimal sway (met)  Short term goal 3: Pt will navigate 3 stairs x4 with normal speed without misstep          Plan: [x] Continue per plan of care [] Alter current plan (see comments)   [] Plan of care initiated [] Hold pending MD visit [] Discharge      Plan for Next Session:      Re-Certification Due Date:         Signature:  Marleta Kayser, PT

## 2022-02-17 ENCOUNTER — CARE COORDINATION (OUTPATIENT)
Dept: OTHER | Facility: CLINIC | Age: 62
End: 2022-02-17

## 2022-02-17 NOTE — CARE COORDINATION
Care Transitions Outreach Attempt    Call within 2 business days of discharge: No   Attempted to reach patient for transitions of care follow up. Unable to reach patient. Patient: Ronit Redd Patient : 1960 MRN: [id number]    Last Discharge [facility]       Complaint Diagnosis Description Type Department Provider    22 Dizziness Cerebrovascular accident (CVA), unspecified mechanism ([address] . .. ED to Hosp-Admission (Discharged) (ADMITTED) BITA Mario MD; James Aguilera. .. Was this an external facility discharge? No Discharge Facility:     Noted following upcoming appointments from discharge chart review:   [facility] follow up appointment(s):   Future Appointments   Date Time Provider Maurizio Elder   2022  9:15 AM Junius Course, PT MHAZ PT [facility]   2022  9:15 AM Junius Course, PT MHA[facility] PT [facility]   2022  9:15 AM Junius Course, PT [facility] PT [facility]   2022  9:15 AM Junius Course, PT [facility] PT [facility]   3/4/2022  9:15 AM Junius Course, PT [facility] PT [facility]   3/11/2022  9:30 AM M[facility]Z ANTICOAGULATION CLIN[facility] ANTICO [facility]     Non-[facility] follow up appointment(s): This is the second attempt. Lost to follow up letter sent via [employer].

## 2022-02-18 ENCOUNTER — HOSPITAL ENCOUNTER (OUTPATIENT)
Dept: PHYSICAL THERAPY | Age: 62
Setting detail: THERAPIES SERIES
Discharge: HOME | End: 2022-02-18
Payer: COMMERCIAL

## 2022-02-18 PROCEDURE — 97112 NEUROMUSCULAR REEDUCATION: CPT

## 2022-02-18 NOTE — FLOWSHEET NOTE
[facility] and Therapy, [facility], [address] [place] [address]  Phone: [phone]  Fax [phone]    Physical Therapy Daily Treatment Note    Date:  2022    Patient Name:  Eren Solorzano    :  1960  MRN: [id number]  Restrictions/Precautions:    Medical/Treatment Diagnosis Information:  · Diagnosis: I63.9 acute CVA  Insurance/Certification information:  PT Insurance Information: BCBS -- 27 combined therapy visits per year  Physician Information:  Referring Practitioner: MARY Garrido  Plan of care signed (Y/N):  sent  Visit# / total visits:  -     G-Code (if applicable):              Time in:   9:17    Timed Treatment: 42 Total Treatment Time:  42  Time out: 9:59  ________________________________________________________________________________________    Pain Level:   0/10 low back, neck  SUBJECTIVE:  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    OBJECTIVE: challenged with lateral sling    Exercise/Equipment Resistance/Repetitions Other comments     Rocker board A/P, lat x1' each       Tandem stance   On airex 2x30\" B  Tandem gt x3 laps     Stair training 3 steps, x8 up and down    FSU    LSU 8\" 2x10  6\" 2x12 B    Toe taps with 2 cones x1' med to lat, lat to med    Lat amb in // bars  Backward amb in // bars x5 laps    With lime green band around thighs  With some LBP     Mini squats on airex x12 With some L knee pain     Stance on airex X30\" stationary with eyes closed  X30\" with neck rotation, eyes closed  X30\" with neck flex/ext, eyes open then closed    sit <-> stand with eyes closed x6 with 1 UE on // bar      Resisted gt with orange SC  Minor LOB with backward, with min assist to correcrt   SLS 2x30\" B on therapad        Hip stacking on 8# ball 2x30\" B      amb with head nods and turns x1' each      Posterior sling 6-inch step, gray TB, x10 B      Lateral sling Gray TB x10 B Multiple LOB Other Therapeutic Activities:       HEP:  FSU, toe taps, SLS, tandem stance  [id number]    Manual Treatments:         Modalities:      Test/Measurements:         ASSESSMENT:      noa progressions well without significant LOB. Progressing as expected. Discussed the possibility of concluding soon.     Treatment/Activity Tolerance:   [x]Patient tolerated treatment well [] Patient limited by fatique  []Patient limited by pain [] Patient limited by other medical complications  [] Other:     Goals:    Short term goals  Time Frame for Short term goals: 3 weeks  Short term goal 1: Pt will be ind and compliant with HEP (met)  Short term goal 2: Pt will stand with eyes closed x30\" with minimal sway (met)  Short term goal 3: Pt will navigate 3 stairs x4 with normal speed without misstep          Plan: [x] Continue per plan of care [] Alter current plan (see comments)   [] Plan of care initiated [] Hold pending MD visit [] Discharge      Plan for Next Session:      Re-Certification Due Date:         Signature:  Cecilia Henao PT

## 2022-02-21 ENCOUNTER — HOSPITAL ENCOUNTER (OUTPATIENT)
Dept: PHYSICAL THERAPY | Age: 62
Setting detail: THERAPIES SERIES
Discharge: HOME | End: 2022-02-21
Payer: COMMERCIAL

## 2022-02-21 PROCEDURE — 97112 NEUROMUSCULAR REEDUCATION: CPT

## 2022-02-21 NOTE — PROGRESS NOTES
Outpatient Physical Therapy  Phone: [phone] Fax: [phone]     To: ZEESHAN Chen CNP    From: Cinthya Canales, PT       Patient: Misti Herrera       : 1960  Diagnosis: I63.9 acute CVA    Date: 2022  Treatment Diagnosis:  Impaired balance    Physical Therapy Progress/Discharge Note    Total Visits to date:  6  Cancels/No-shows to date:   0    Plan of Care/Treatment to date:  [x] Therapeutic Exercise    [] Modalities:  [] Therapeutic Activity     [] Ultrasound   [] Electrical Stimulation   [] Gait Training      [] Cervical Traction   [] Lumbar Traction  [x] Neuromuscular Re-education  [] Cold/hotpack  [] Iontophoresis  [x] Instruction in HEP      Other:  [] Manual Therapy       []                                 [] Aquatic Therapy       []                                     Significant Findings At Last Visit/Comments:    ·  Pt completing high-level balance tasks in clinic with very few LOB. Able to self-correct if they do happen. · Compliant with HEP  · Plan to make today pt's final visit, but will hold chart open x30 days in case he encounters any balance issues during that time. I don't anticipate that any further PT will be needed.        Progress towards goals:    Short term goals  Time Frame for Short term goals: 3 weeks  Short term goal 1: Pt will be ind and compliant with HEP (met)  Short term goal 2: Pt will stand with eyes closed x30\" with minimal sway (met)  Short term goal 3: Pt will navigate 3 stairs x4 with normal speed without misstep (met)    Frequency/Duration:  # Days per week: [] 1 day # Weeks: [] 1 week [] 4 weeks      [x] 2 days   [] 2 weeks [] 5 weeks     [] 3 days   [x] 3 weeks [] 6 weeks     Rehab Potential: [] Excellent [x] Good [] Fair  [] Poor     Goal Status:  [x] Achieved [] Partially Achieved  [] Not Achieved     Patient Status: [x] Continue per initial plan of Care     [] Patient now discharged     [] Additional visits requested, Please re-certify for additional visits:      Requested frequency/duration:  X/week for weeks    Electronically signed by:  Janina Pagan PT      If you have any questions or concerns, please don't hesitate to call.   Thank you for your referral.    Physician Signature:________________________________Date:__________________  By signing above, therapists plan is approved by physician

## 2022-02-21 NOTE — FLOWSHEET NOTE
[address] [place] [address] and Therapy[address]  Phone: [phone]  Fax [phone]    Physical Therapy Daily Treatment Note    Date:  2022    Patient Name:  Janusz Stovall    :  1960  MRN: [id number]  Restrictions/Precautions:    Medical/Treatment Diagnosis Information:  · Diagnosis: I63.9 acute CVA  Insurance/Certification information:  PT Insurance Information: [employer] -- 27 combined therapy visits per year  Physician Information:  Referring Practitioner: MARY Jama  Plan of care signed (Y/N):  sent  Visit# / total visits:  -     G-Code (if applicable):              Time in:   9:16   Timed Treatment: 40 Total Treatment Time:  40  Time out: 9:56  ________________________________________________________________________________________    Pain Level:   0/10 low back, neck  SUBJECTIVE:  States he feels ready to make today his final visit. Agreeable to have his chart left open x30 days.     OBJECTIVE: no LOB with any ex with the exception of rocker board    Exercise/Equipment Resistance/Repetitions Other comments     Rocker board A/P, lat x1' each  Static and tapping       Tandem stance     Tandem gt x3 laps     Stair training 3 steps, x8 up and down    FSU    LSU     Toe taps with 2 cones x1' med to lat, lat to med    Lat amb in // bars  Backward amb in // bars x5 laps    With lime green band around thighs  With some LBP     Mini squats on inverted BOSU 2x10       Stance on airex   X30\" with neck rotation, eyes closed  X30\" with neck flex/ext, eyes open then closed    sit <-> stand with eyes closed x6 with 1 UE on // bar      Resisted gt with orange SC  Minor LOB with backward, with min assist to correcrt   SLS 2x30\" B on therapad        Hip stacking on 8# ball       amb with head nods and turns x1' each      Posterior sling 8-inch step, gray TB, x10 B      Lateral sling Gray TB x10 B no LOB     Foam balance beam Tandem gt x6 laps      braiding x2 laps without UE support                           Other Therapeutic Activities:       HEP:  FSU, toe taps, SLS, tandem stance  [id number]    Manual Treatments:         Modalities:      Test/Measurements:         ASSESSMENT:      All goals met. Pt shows no functional limitations. Will make today his final visit but keep chart open x30 days in case pt encounters any balance struggles in that time.     Treatment/Activity Tolerance:   [x]Patient tolerated treatment well [] Patient limited by fatique  []Patient limited by pain [] Patient limited by other medical complications  [] Other:     Goals:    Short term goals  Time Frame for Short term goals: 3 weeks  Short term goal 1: Pt will be ind and compliant with HEP (met)  Short term goal 2: Pt will stand with eyes closed x30\" with minimal sway (met)  Short term goal 3: Pt will navigate 3 stairs x4 with normal speed without misstep (met)         Plan: [] Continue per plan of care [x] Alter current plan (see comments)   [] Plan of care initiated [] Hold pending MD visit [] Discharge      Plan for Next Session:      Re-Certification Due Date:         Signature:  Lydia Rivera PT

## 2022-02-24 ENCOUNTER — CARE COORDINATION (OUTPATIENT)
Dept: OTHER | Facility: CLINIC | Age: 62
End: 2022-02-24

## 2022-02-24 NOTE — CARE COORDINATION
Care Transitions Outreach Attempt    Call within 2 business days of discharge: No   Attempted to reach patient for transitions of care follow up. Unable to reach patient. Patient: Sean Rizzo Patient : 1960 MRN: [id number]    Last Discharge [facility]       Complaint Diagnosis Description Type Department Provider    21 Dizziness Cerebrovascular accident (CVA), unspecified mechanism ([address] . .. ED to Hosp-Admission (Discharged) (ADMITTED) [facility] B3 Sandi Lora MD; Marcelo Turner. .. Was this an external facility discharge? No Discharge Facility:     Noted following upcoming appointments from discharge chart review:   [facility] follow up appointment(s):   Future Appointments   Date Time Provider Maurizio Elder   3/11/2022  9:30 AM [address] WHITLEY     Non-[facility] follow up appointment(s): This is the final attempt. Final UTR letter sent via [employer]. No further outreach scheduled with this ACM, ACM will sign off care team at this time. Episode of Care resolved. Patient has this ACM's contact information if future needs arise.

## 2022-02-25 ENCOUNTER — APPOINTMENT (OUTPATIENT)
Dept: PHYSICAL THERAPY | Age: 62
End: 2022-02-25
Payer: COMMERCIAL

## 2022-02-28 ENCOUNTER — APPOINTMENT (OUTPATIENT)
Dept: PHYSICAL THERAPY | Age: 62
End: 2022-02-28
Payer: COMMERCIAL

## 2022-03-05 DIAGNOSIS — E78.2 MIXED HYPERLIPIDEMIA: ICD-10-CM

## 2022-03-07 RX ORDER — ATORVASTATIN CALCIUM 40 MG/1
TABLET, FILM COATED ORAL
Qty: 90 TABLET | Refills: 3 | Status: SHIPPED | OUTPATIENT
Start: 2022-03-07

## 2022-03-07 NOTE — TELEPHONE ENCOUNTER
Refill request for ATORVASTATIN medication.      Name of Pharmacy- [facility]      Last visit - 1/17/22     Pending visit - NONE    Last refill -11/23/21      Medication Contract signed -   Last Oarrs ran-         Additional Comments

## 2022-03-11 ENCOUNTER — ANTI-COAG VISIT (OUTPATIENT)
Dept: PHARMACY | Age: 62
End: 2022-03-11
Payer: COMMERCIAL

## 2022-03-11 DIAGNOSIS — Z79.01 LONG TERM CURRENT USE OF ANTICOAGULANT: Primary | ICD-10-CM

## 2022-03-11 LAB — INR BLD: 2.6

## 2022-03-11 PROCEDURE — 85610 PROTHROMBIN TIME: CPT

## 2022-03-11 PROCEDURE — 99211 OFF/OP EST MAY X REQ PHY/QHP: CPT

## 2022-03-11 NOTE — PROGRESS NOTES
Mr. Jacquelyn Longo has hx of multiple PE s/p 11/2009 and 6/2012. 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 He presents today w/out complaint. Pt. denies any s/sx bleeding/bruising/ swelling/SOB. No missed doses. No changes in Rx/OTC/herbal medications. Pt denies EtOH use. Pt reports no changes to medications/diet. Still taking Aspirin 81 mg daily. INR 2.6 is within therapeutic goal range of 2-3  Recommend to continue 7.5 mg on Thur and 5 mg all other days  Pt has 5 mg tablets  Will continue to monitor and check INR in 4 weeks.   Dosing reminder card given with phone number, appointment date and time  Return to clinic: 4/8 @ 0930 am  Referring provider: Dr. Kaye Brown   PharmD Candidate, 2022  3/11/2022 at 9:42 AM

## 2022-04-08 ENCOUNTER — ANTI-COAG VISIT (OUTPATIENT)
Dept: PHARMACY | Age: 62
End: 2022-04-08
Payer: COMMERCIAL

## 2022-04-08 DIAGNOSIS — Z79.01 LONG TERM CURRENT USE OF ANTICOAGULANT: Primary | ICD-10-CM

## 2022-04-08 LAB — INR BLD: 2.4

## 2022-04-08 PROCEDURE — 99211 OFF/OP EST MAY X REQ PHY/QHP: CPT | Performed by: FAMILY MEDICINE

## 2022-04-08 PROCEDURE — 85610 PROTHROMBIN TIME: CPT | Performed by: FAMILY MEDICINE

## 2022-04-08 NOTE — PROGRESS NOTES
Mr. Low Olea has hx of multiple PE s/p 11/2009 and 6/2012. 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 He presents today w/out complaint. Pt. denies any s/sx bleeding/bruising/ swelling/SOB. No missed doses. No changes in Rx/OTC/herbal medications. Pt denies EtOH use. Pt reports no changes to medications/diet. Still taking Aspirin 81 mg daily. INR 2.4 is within therapeutic goal range of 2-3  Recommend to continue 7.5 mg on Thur and 5 mg all other days  Pt has 5 mg tablets  Will continue to monitor and check INR in 4 weeks.   Dosing reminder card given with phone number, appointment date and time  Return to clinic: 5/6 @ 930 am   Referring provider: Dr. June Rm, PharmD 4/8/2022 9:30 AM

## 2022-05-06 ENCOUNTER — ANTI-COAG VISIT (OUTPATIENT)
Dept: PHARMACY | Age: 62
End: 2022-05-06
Payer: COMMERCIAL

## 2022-05-06 DIAGNOSIS — Z79.01 LONG TERM CURRENT USE OF ANTICOAGULANT: Primary | ICD-10-CM

## 2022-05-06 LAB — INR BLD: 2.5

## 2022-05-06 PROCEDURE — 85610 PROTHROMBIN TIME: CPT | Performed by: FAMILY MEDICINE

## 2022-05-06 PROCEDURE — 99211 OFF/OP EST MAY X REQ PHY/QHP: CPT | Performed by: FAMILY MEDICINE

## 2022-05-06 NOTE — PROGRESS NOTES
Mr. Jobie Spatz has hx of multiple PE s/p 11/2009 and 6/2012. 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 He presents today w/out complaint. Pt. denies any s/sx bleeding/bruising/ swelling/SOB. No missed doses. No changes in Rx/OTC/herbal medications. Pt denies EtOH use. Pt reports no changes to medications/diet. Still taking Aspirin 81 mg daily. Experiencing a few nosebleeds but bleeding stops after a few minutes. Pt needs refill on 5mg tabs called into the pharmacy. Home delivery pharmacy in [place]. P:[phone], F: [phone]    INR 2.5 is within therapeutic goal range of 2-3  Recommend to continue 7.5 mg on Thur and 5 mg all other days  Pt has 5 mg tablets  Will continue to monitor and check INR in 4 weeks.   Dosing reminder card given with phone number, appointment date and time  Return to clinic: 06/02 @ 945 am   Referring provider: Dr. Steve George, Student Pharmacist 5/6/22 9:36 AM    I have seen the patient and I agree with the assessment and plan created by the PharmD Candidate  Naz Silvestre PharmD 5/6/2022 9:56 AM

## 2022-05-23 RX ORDER — WARFARIN SODIUM 5 MG/1
TABLET ORAL
Qty: 90 TABLET | Refills: 3 | Status: SHIPPED | OUTPATIENT
Start: 2022-05-23

## 2022-05-23 NOTE — TELEPHONE ENCOUNTER
Refill request for warfarin medication. [facility]       Last visit - 01/17/2022     Pending visit - 06/02/2022    Last refill -02/27/2022      Medication Contract signed -PDMP Monitoring:    Last PDMP [facility] SYSTEM as Reviewed [facility]):  Review User Review Instant Review Result          [unfilled]  Urine Drug Screenings (1 yr)    No resulted procedures found.        Medication Contract and Consent for Opioid Use Documents Filed      No documents found                 Last Loyda villalba-         Additional Comments

## 2022-06-02 ENCOUNTER — ANTI-COAG VISIT (OUTPATIENT)
Dept: PHARMACY | Age: 62
End: 2022-06-02
Payer: COMMERCIAL

## 2022-06-02 DIAGNOSIS — Z79.01 LONG TERM CURRENT USE OF ANTICOAGULANT: Primary | ICD-10-CM

## 2022-06-02 LAB — INR BLD: 2.2

## 2022-06-02 PROCEDURE — 99211 OFF/OP EST MAY X REQ PHY/QHP: CPT

## 2022-06-02 PROCEDURE — 85610 PROTHROMBIN TIME: CPT

## 2022-06-02 NOTE — PROGRESS NOTES
Mr. Ny Cabrera has hx of multiple PE s/p 11/2009 and 6/2012. 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 He presents today w/out complaint. Pt. denies any s/sx bleeding/bruising/ swelling/SOB. No missed doses. No changes in Rx/OTC/herbal medications. Pt denies EtOH use. Pt reports no changes to medications/diet. Still taking Aspirin 81 mg daily. Experiencing a few nosebleeds but bleeding stops after a few minutes. Home delivery pharmacy in [facility]. P:[phone], F: [phone]    INR 2.2 is within therapeutic goal range of 2-3  Recommend to continue 7.5 mg on Thur and 5 mg all other days  Pt has 5 mg tablets  Will continue to monitor and check INR in 4 weeks.   Dosing reminder card given with phone number, appointment date and time  Return to clinic: 7/7 @ [phone]  Referring provider: Dr. Pb Larose

## 2022-06-27 LAB
CHOLESTEROL, TOTAL: 155 MG/DL (ref 0–199)
GLUCOSE BLD-MCNC: 102 MG/DL (ref 70–99)
HDLC SERPL-MCNC: 36 MG/DL (ref 40–60)
LDL CHOLESTEROL CALCULATED: 94 MG/DL
TRIGL SERPL-MCNC: 123 MG/DL (ref 0–150)

## 2022-07-07 ENCOUNTER — ANTI-COAG VISIT (OUTPATIENT)
Dept: PHARMACY | Age: 62
End: 2022-07-07

## 2022-07-07 DIAGNOSIS — Z79.01 LONG TERM CURRENT USE OF ANTICOAGULANT: Primary | ICD-10-CM

## 2022-07-07 LAB — INTERNATIONAL NORMALIZATION RATIO, POC: 2.4

## 2022-07-07 NOTE — PROGRESS NOTES
Mr. Anca Baumann has hx of multiple PE s/p 11/2009 and 6/2012. 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 He presents today w/out complaint. Pt. denies any s/sx bleeding/bruising/ swelling/SOB. No missed doses. No changes in Rx/OTC/herbal medications. Pt denies EtOH use. Home delivery pharmacy in [facility]. P:[phone], F: [phone]    Pt reports no changes to medications/diet. Still taking Aspirin 81 mg daily. Experiencing a few nosebleeds but bleeding stops after a few minutes. INR 2.4 is within therapeutic goal range of 2-3  Recommend to continue 7.5 mg on Thur and 5 mg all other days  Pt has 5 mg tablets  Will continue to monitor and check INR in 4 weeks.   Dosing reminder card given with phone number, appointment date and time  Return to clinic: 8/4 @ [phone]  Referring provider: Dr. Karla Thompson, PharmD  7/7/2022 at 9:56 AM

## 2022-08-04 ENCOUNTER — ANTI-COAG VISIT (OUTPATIENT)
Dept: PHARMACY | Age: 62
End: 2022-08-04
Payer: COMMERCIAL

## 2022-08-04 DIAGNOSIS — Z79.01 LONG TERM CURRENT USE OF ANTICOAGULANT: Primary | ICD-10-CM

## 2022-08-04 LAB — INR BLD: 2.7

## 2022-08-04 PROCEDURE — 99211 OFF/OP EST MAY X REQ PHY/QHP: CPT

## 2022-08-04 PROCEDURE — 85610 PROTHROMBIN TIME: CPT

## 2022-08-04 NOTE — PROGRESS NOTES
Mr. Dasha Obregon has hx of multiple PE s/p 11/2009 and 6/2012. 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 He presents today w/out complaint. Pt. denies any s/sx bleeding/bruising/ swelling/SOB. No missed doses. No changes in Rx/OTC/herbal medications. Pt denies EtOH use. Home delivery pharmacy in [facility] [place]. P:[phone], F: [phone]    Pt reports no changes to medications/diet. Still taking Aspirin 81 mg daily. Experiencing a few nosebleeds but bleeding stops after a few minutes. INR 2.7 is within therapeutic goal range of 2-3  Recommend to continue 7.5 mg on Thur and 5 mg all other days  Pt has 5 mg tablets  Will continue to monitor and check INR in 4 weeks.   Dosing reminder card given with phone number, appointment date and time  Return to clinic:  9/1 @ [phone]  Referring provider: Dr. Kade Padilla

## 2022-09-01 ENCOUNTER — ANTI-COAG VISIT (OUTPATIENT)
Dept: PHARMACY | Age: 62
End: 2022-09-01
Payer: COMMERCIAL

## 2022-09-01 DIAGNOSIS — Z79.01 LONG TERM CURRENT USE OF ANTICOAGULANT: Primary | ICD-10-CM

## 2022-09-01 LAB — INR BLD: 1.3

## 2022-09-01 PROCEDURE — 85610 PROTHROMBIN TIME: CPT

## 2022-09-01 PROCEDURE — 99211 OFF/OP EST MAY X REQ PHY/QHP: CPT

## 2022-09-01 NOTE — PROGRESS NOTES
Mr. Sallie Warren has hx of multiple PE s/p 11/2009 and 6/2012. 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 He presents today w/out complaint. Pt. denies any s/sx bleeding/bruising/ swelling/SOB. No missed doses. No changes in Rx/OTC/herbal medications. Pt denies EtOH use. Home delivery pharmacy in [facility]. P:[phone], F: [phone]    Pt reports no changes to medications/diet. Still taking Aspirin 81 mg daily. Experiencing a few nosebleeds but bleeding stops after a few minutes. Missed Monday and Tuesday doses. INR 1.3 is below goal range of 2-3. Subtherapeutic INR due to missed dose. Recommend boosting with 12.5mg today and 10mg tomorrow. Continue normal dosing regimen with 7.5 mg on Thur and 5 mg all other days thereafter. Pt has 5 mg tablets  Will continue to monitor and check INR in 2 weeks.   Dosing reminder card given with phone number, appointment date and time  Return to clinic:  9/15 @ [phone]  Referring provider: Dr. Richard Wells PharmD Candidate [id number]

## 2022-09-15 ENCOUNTER — ANTI-COAG VISIT (OUTPATIENT)
Dept: PHARMACY | Age: 62
End: 2022-09-15
Payer: COMMERCIAL

## 2022-09-15 DIAGNOSIS — Z79.01 LONG TERM CURRENT USE OF ANTICOAGULANT: Primary | ICD-10-CM

## 2022-09-15 LAB — INR BLD: 2.7

## 2022-09-15 PROCEDURE — 85610 PROTHROMBIN TIME: CPT | Performed by: FAMILY MEDICINE

## 2022-09-15 PROCEDURE — 99211 OFF/OP EST MAY X REQ PHY/QHP: CPT | Performed by: FAMILY MEDICINE

## 2022-09-15 NOTE — PROGRESS NOTES
Mr. Taran Hammond has hx of multiple PE s/p 11/2009 and 6/2012. 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 He presents today w/out complaint. Pt. denies any s/sx bleeding/bruising/ swelling/SOB. No missed doses. No changes in Rx/OTC/herbal medications. Pt denies EtOH use. Home delivery pharmacy in [place]. P:[phone], F: [phone]    Pt reports no changes to medications/diet. Still taking Aspirin 81 mg daily. Experiencing a few nosebleeds but bleeding stops after a few minutes. No other changes    INR 2.7 is within goal range of 2-3. Recommend to continue normal dosing regimen with 7.5 mg on Thur and 5 mg all other days thereafter. Pt has 5 mg tablets  Will continue to monitor and check INR in 4 weeks.   Dosing reminder card given with phone number, appointment date and time  Return to clinic:  10/13 @ 9:45AM  Referring provider: Dr. Larry Hsu PharmD Candidate [id number]

## 2022-10-13 ENCOUNTER — ANTI-COAG VISIT (OUTPATIENT)
Dept: PHARMACY | Age: 62
End: 2022-10-13

## 2022-10-13 DIAGNOSIS — Z79.01 LONG TERM CURRENT USE OF ANTICOAGULANT: Primary | ICD-10-CM

## 2022-10-13 LAB — INR BLD: 2.9

## 2022-10-13 NOTE — PROGRESS NOTES
Mr. Joanna Marin has hx of multiple PE s/p 11/2009 and 6/2012. 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 He presents today w/out complaint. Pt. denies any s/sx bleeding/bruising/ swelling/SOB. No missed doses. No changes in Rx/OTC/herbal medications. Pt denies EtOH use. Home delivery pharmacy in [facility] [place]. P:[phone], F: [phone]    Pt reports no changes to medications/diet. Still taking Aspirin 81 mg daily. No other changes    INR 2.9 is within goal range of 2-3. Recommend to continue normal dosing regimen with 7.5 mg on Thur and 5 mg all other days thereafter. Pt has 5 mg tablets  Will continue to monitor and check INR in 4 weeks.   Dosing reminder card given with phone number, appointment date and time  Return to clinic:  11/10/22 @ 10:15  Referring provider: Dr. Deondre Aaron PharmD Candidate  10/13/2022 9:49 AM

## 2022-11-10 ENCOUNTER — ANTI-COAG VISIT (OUTPATIENT)
Dept: PHARMACY | Age: 62
End: 2022-11-10
Payer: COMMERCIAL

## 2022-11-10 DIAGNOSIS — Z79.01 LONG TERM CURRENT USE OF ANTICOAGULANT: ICD-10-CM

## 2022-11-10 DIAGNOSIS — I26.99 PULMONARY EMBOLISM, UNSPECIFIED CHRONICITY, UNSPECIFIED PULMONARY EMBOLISM TYPE, UNSPECIFIED WHETHER ACUTE COR PULMONALE PRESENT (HCC): Primary | ICD-10-CM

## 2022-11-10 LAB — INTERNATIONAL NORMALIZATION RATIO, POC: 2.9

## 2022-11-10 PROCEDURE — 99211 OFF/OP EST MAY X REQ PHY/QHP: CPT | Performed by: INTERNAL MEDICINE

## 2022-11-10 PROCEDURE — 85610 PROTHROMBIN TIME: CPT | Performed by: INTERNAL MEDICINE

## 2022-11-10 NOTE — PROGRESS NOTES
MrArielle Oliva has hx of multiple PE s/p 11/2009 and 6/2012. 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 He presents today w/out complaint. Pt. denies any s/sx bleeding/bruising/ swelling/SOB. No missed doses. No changes in Rx/OTC/herbal medications. Pt denies EtOH use. Home delivery pharmacy in [place]. P:[phone], F: [phone]    Pt reports no changes to medications/diet. Still taking Aspirin 81 mg daily. No other changes    INR 2.9 is within goal range of 2-3. Recommend to continue normal dosing regimen with 7.5 mg on Thur and 5 mg all other days thereafter. Pt has 5 mg tablets  Will continue to monitor and check INR in 4 weeks.   Dosing reminder card given with phone number, appointment date and time  Return to clinic:  12/8/22 @ 10:15  Referring provider: Dr. Carol Cerda

## 2022-12-08 ENCOUNTER — ANTI-COAG VISIT (OUTPATIENT)
Dept: PHARMACY | Age: 62
End: 2022-12-08
Payer: COMMERCIAL

## 2022-12-08 DIAGNOSIS — Z79.01 LONG TERM CURRENT USE OF ANTICOAGULANT: ICD-10-CM

## 2022-12-08 DIAGNOSIS — I26.99 PULMONARY EMBOLISM, UNSPECIFIED CHRONICITY, UNSPECIFIED PULMONARY EMBOLISM TYPE, UNSPECIFIED WHETHER ACUTE COR PULMONALE PRESENT (HCC): Primary | ICD-10-CM

## 2022-12-08 LAB — INR BLD: 2.2

## 2022-12-08 PROCEDURE — 85610 PROTHROMBIN TIME: CPT | Performed by: FAMILY MEDICINE

## 2022-12-08 PROCEDURE — 99211 OFF/OP EST MAY X REQ PHY/QHP: CPT | Performed by: FAMILY MEDICINE

## 2022-12-08 NOTE — PROGRESS NOTES
Mr. Bella Oliva has hx of multiple PE s/p 11/2009 and 6/2012. 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 He presents today w/out complaint. Pt. denies any s/sx bleeding/bruising/ swelling/SOB. No missed doses. No changes in Rx/OTC/herbal medications. Pt denies EtOH use. Home delivery pharmacy in [facility]. P:[phone], F: [phone]    Pt reports no changes to medications/diet. Still taking Aspirin 81 mg daily. No other changes    INR 2.2 is within goal range of 2-3. Recommend to continue normal dosing regimen with 7.5 mg on Thur and 5 mg all other days thereafter. Pt has 5 mg tablets  Will continue to monitor and check INR in 4 weeks.   Dosing reminder card given with phone number, appointment date and time  Return to clinic: 1/5 @ [address] am   Referring provider: Dr. Isabel Key, PharmD 12/8/2022 10:15 AM

## 2023-01-05 ENCOUNTER — ANTI-COAG VISIT (OUTPATIENT)
Dept: PHARMACY | Age: 63
End: 2023-01-05
Payer: COMMERCIAL

## 2023-01-05 DIAGNOSIS — Z79.01 LONG TERM CURRENT USE OF ANTICOAGULANT: Primary | ICD-10-CM

## 2023-01-05 LAB — INR BLD: 2.8

## 2023-01-05 PROCEDURE — 85610 PROTHROMBIN TIME: CPT

## 2023-01-05 PROCEDURE — 99211 OFF/OP EST MAY X REQ PHY/QHP: CPT

## 2023-01-05 RX ORDER — WARFARIN SODIUM 5 MG/1
TABLET ORAL
Qty: 96 TABLET | Refills: 3 | Status: SHIPPED | OUTPATIENT
Start: 2023-01-05

## 2023-01-05 NOTE — PROGRESS NOTES
Mr. Leigh Ann Dickens has hx of multiple PE s/p 11/2009 and 6/2012. 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 He presents today w/out complaint. Pt. denies any s/sx bleeding/bruising/ swelling/SOB. No missed doses. No changes in Rx/OTC/herbal medications. Pt denies EtOH use.   P:[phone], F: [phone]    Pt reports no changes . INR 2.8 is within goal range of 2-3. Recommend to continue normal dosing regimen with 7.5 mg on Thur and 5 mg all other days thereafter. Pt has 5 mg tablets  Will continue to monitor and check INR in 4 weeks.   Dosing reminder card given with phone number, appointment date and time  Return to clinic: 2/2 @ [phone] am   Referring provider: Dr. Shazia Cordero

## 2023-02-02 ENCOUNTER — ANTI-COAG VISIT (OUTPATIENT)
Dept: PHARMACY | Age: 63
End: 2023-02-02

## 2023-02-02 DIAGNOSIS — Z79.01 LONG TERM CURRENT USE OF ANTICOAGULANT: Primary | ICD-10-CM

## 2023-02-02 LAB — INR BLD: 2.5

## 2023-02-02 NOTE — PROGRESS NOTES
Mr. Joanna Marin has hx of multiple PE s/p 11/2009 and 6/2012. 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 He presents today w/out complaint. Pt. denies any s/sx bleeding/bruising/ swelling/SOB. No missed doses. No changes in Rx/OTC/herbal medications. Pt denies EtOH use.   P:[phone], F: [phone]    Pt reports no changes . INR 2.5 is within goal range of 2-3. Recommend to continue Warfarin 7.5 mg on Thur and 5 mg all other days thereafter. Pt has 5 mg tablets  Will continue to monitor and check INR in 4 weeks.   Dosing reminder card given with phone number, appointment date and time  Return to clinic: 3/2 @ 11:45 am   Referring provider: Dr. James Lizarraga

## 2023-02-18 DIAGNOSIS — E78.2 MIXED HYPERLIPIDEMIA: ICD-10-CM

## 2023-02-20 RX ORDER — ATORVASTATIN CALCIUM 40 MG/1
TABLET, FILM COATED ORAL
Qty: 90 TABLET | Refills: 3 | Status: SHIPPED | OUTPATIENT
Start: 2023-02-20

## 2023-02-20 NOTE — TELEPHONE ENCOUNTER
Refill request for ATORVASTATIN CALCIUM 40MG TABS medication.      Name of [address] [place] [address]      Last visit - 1-     Pending visit - N/A    Last refill - 11-      Medication Contract signed -   Last Linda villalba-         Additional Comments

## 2023-03-02 ENCOUNTER — ANTI-COAG VISIT (OUTPATIENT)
Dept: PHARMACY | Age: 63
End: 2023-03-02
Payer: COMMERCIAL

## 2023-03-02 DIAGNOSIS — Z79.01 LONG TERM CURRENT USE OF ANTICOAGULANT: Primary | ICD-10-CM

## 2023-03-02 LAB — INR BLD: 2.2

## 2023-03-02 PROCEDURE — 85610 PROTHROMBIN TIME: CPT

## 2023-03-02 PROCEDURE — 99211 OFF/OP EST MAY X REQ PHY/QHP: CPT

## 2023-03-02 NOTE — PROGRESS NOTES
Mr. Jossy Suresh has hx of multiple PE s/p 11/2009 and 6/2012. 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 He presents today w/out complaint. Pt. denies any s/sx bleeding/bruising/ swelling/SOB. Pt states he does get occasional nose bleeds. No missed doses. No changes in Rx/OTC/herbal medications. Pt denies EtOH use.   P:[phone], F: [phone]    Pt reports no changes . INR 2.2 is within goal range of 2-3. Recommend to continue Warfarin 7.5 mg on Thur and 5 mg all other days thereafter. Pt has 5 mg tablets  Will continue to monitor and check INR in 4 weeks.   Dosing reminder card given with phone number, appointment date and time  Return to clinic: 4/5 @ [phone]  Referring provider: Dr. Aj Belle Only    Intervention Detail:   Total # of Interventions Recommended: 0  Total # of Interventions Accepted: 0  Time Spent (min): 15

## 2023-03-27 ENCOUNTER — TELEPHONE (OUTPATIENT)
Dept: INTERNAL MEDICINE CLINIC | Age: 63
End: 2023-03-27

## 2023-03-27 RX ORDER — WARFARIN SODIUM 5 MG/1
TABLET ORAL
Qty: 90 TABLET | Refills: 3 | Status: SHIPPED | OUTPATIENT
Start: 2023-03-27

## 2023-03-27 NOTE — TELEPHONE ENCOUNTER
Left v mail to call office to schedule a check up-physical with Mandy Montelongo  Last physical done on 1/17/22, check if patient is now on Medicare.

## 2023-03-27 NOTE — TELEPHONE ENCOUNTER
Refill request for WARFARIN SODIUM 5MG TABS medication.      Name of [address] [place] [address]      Last visit - 1-     Pending visit - N/A    Last refill - 1-5-2023      Medication Contract signed -   Pee villalba-         Additional Comments

## 2023-04-04 ENCOUNTER — OFFICE VISIT (OUTPATIENT)
Dept: INTERNAL MEDICINE CLINIC | Age: 63
End: 2023-04-04
Payer: COMMERCIAL

## 2023-04-04 VITALS
WEIGHT: 235 LBS | TEMPERATURE: 97.3 F | DIASTOLIC BLOOD PRESSURE: 80 MMHG | HEIGHT: 76 IN | OXYGEN SATURATION: 98 % | SYSTOLIC BLOOD PRESSURE: 126 MMHG | RESPIRATION RATE: 12 BRPM | BODY MASS INDEX: 28.62 KG/M2 | HEART RATE: 68 BPM

## 2023-04-04 DIAGNOSIS — Z12.5 SCREENING FOR PROSTATE CANCER: ICD-10-CM

## 2023-04-04 DIAGNOSIS — I10 HTN (HYPERTENSION), BENIGN: Primary | ICD-10-CM

## 2023-04-04 DIAGNOSIS — D68.69 SECONDARY HYPERCOAGULABLE STATE (HCC): ICD-10-CM

## 2023-04-04 DIAGNOSIS — I26.99 PULMONARY EMBOLISM, OTHER, UNSPECIFIED CHRONICITY, UNSPECIFIED WHETHER ACUTE COR PULMONALE PRESENT (HCC): ICD-10-CM

## 2023-04-04 DIAGNOSIS — L97.509 ULCER OF FOOT, UNSPECIFIED LATERALITY, UNSPECIFIED ULCER STAGE (HCC): ICD-10-CM

## 2023-04-04 DIAGNOSIS — S06.5XAA SUBDURAL HEMATOMA (HCC): ICD-10-CM

## 2023-04-04 DIAGNOSIS — Z79.01 LONG TERM CURRENT USE OF ANTICOAGULANT: ICD-10-CM

## 2023-04-04 DIAGNOSIS — I48.91 ATRIAL FIBRILLATION, UNSPECIFIED TYPE (HCC): ICD-10-CM

## 2023-04-04 DIAGNOSIS — Z86.718 PERSONAL HISTORY OF DVT (DEEP VEIN THROMBOSIS): ICD-10-CM

## 2023-04-04 DIAGNOSIS — E78.2 MIXED HYPERLIPIDEMIA: ICD-10-CM

## 2023-04-04 DIAGNOSIS — I48.0 PAF (PAROXYSMAL ATRIAL FIBRILLATION) (HCC): ICD-10-CM

## 2023-04-04 PROBLEM — Z87.891 FORMER SMOKER: Status: RESOLVED | Noted: 2022-01-07 | Resolved: 2023-04-04

## 2023-04-04 PROBLEM — G93.49 LEUKOENCEPHALOPATHY: Status: RESOLVED | Noted: 2022-01-07 | Resolved: 2023-04-04

## 2023-04-04 PROBLEM — E87.1 ACUTE HYPONATREMIA: Status: RESOLVED | Noted: 2022-01-07 | Resolved: 2023-04-04

## 2023-04-04 PROBLEM — E83.42 HYPOMAGNESEMIA: Status: RESOLVED | Noted: 2022-01-07 | Resolved: 2023-04-04

## 2023-04-04 PROCEDURE — 3074F SYST BP LT 130 MM HG: CPT | Performed by: NURSE PRACTITIONER

## 2023-04-04 PROCEDURE — 3079F DIAST BP 80-89 MM HG: CPT | Performed by: NURSE PRACTITIONER

## 2023-04-04 PROCEDURE — 99214 OFFICE O/P EST MOD 30 MIN: CPT | Performed by: NURSE PRACTITIONER

## 2023-04-04 SDOH — ECONOMIC STABILITY: INCOME INSECURITY: HOW HARD IS IT FOR YOU TO PAY FOR THE VERY BASICS LIKE FOOD, HOUSING, MEDICAL CARE, AND HEATING?: NOT HARD AT ALL

## 2023-04-04 SDOH — ECONOMIC STABILITY: HOUSING INSECURITY
IN THE LAST 12 MONTHS, WAS THERE A TIME WHEN YOU DID NOT HAVE A STEADY PLACE TO SLEEP OR SLEPT IN A SHELTER (INCLUDING NOW)?: NO

## 2023-04-04 SDOH — ECONOMIC STABILITY: FOOD INSECURITY: WITHIN THE PAST 12 MONTHS, THE FOOD YOU BOUGHT JUST DIDN'T LAST AND YOU DIDN'T HAVE MONEY TO GET MORE.: NEVER TRUE

## 2023-04-04 SDOH — ECONOMIC STABILITY: FOOD INSECURITY: WITHIN THE PAST 12 MONTHS, YOU WORRIED THAT YOUR FOOD WOULD RUN OUT BEFORE YOU GOT MONEY TO BUY MORE.: NEVER TRUE

## 2023-04-04 ASSESSMENT — ENCOUNTER SYMPTOMS
DIARRHEA: 0
SINUS PAIN: 0
VOMITING: 0
SHORTNESS OF BREATH: 0
SORE THROAT: 0
CONSTIPATION: 0
CHEST TIGHTNESS: 0
NAUSEA: 0
COUGH: 0

## 2023-04-04 ASSESSMENT — PATIENT HEALTH QUESTIONNAIRE - PHQ9
9. THOUGHTS THAT YOU WOULD BE BETTER OFF DEAD, OR OF HURTING YOURSELF: 0
3. TROUBLE FALLING OR STAYING ASLEEP: 0
8. MOVING OR SPEAKING SO SLOWLY THAT OTHER PEOPLE COULD HAVE NOTICED. OR THE OPPOSITE, BEING SO FIGETY OR RESTLESS THAT YOU HAVE BEEN MOVING AROUND A LOT MORE THAN USUAL: 0
SUM OF ALL RESPONSES TO PHQ QUESTIONS 1-9: 0
6. FEELING BAD ABOUT YOURSELF - OR THAT YOU ARE A FAILURE OR HAVE LET YOURSELF OR YOUR FAMILY DOWN: 0
4. FEELING TIRED OR HAVING LITTLE ENERGY: 0
SUM OF ALL RESPONSES TO PHQ QUESTIONS 1-9: 0
SUM OF ALL RESPONSES TO PHQ9 QUESTIONS 1 & 2: 0
10. IF YOU CHECKED OFF ANY PROBLEMS, HOW DIFFICULT HAVE THESE PROBLEMS MADE IT FOR YOU TO DO YOUR WORK, TAKE CARE OF THINGS AT HOME, OR GET ALONG WITH OTHER PEOPLE: 0
2. FEELING DOWN, DEPRESSED OR HOPELESS: 0
SUM OF ALL RESPONSES TO PHQ QUESTIONS 1-9: 0
7. TROUBLE CONCENTRATING ON THINGS, SUCH AS READING THE NEWSPAPER OR WATCHING TELEVISION: 0
5. POOR APPETITE OR OVEREATING: 0
1. LITTLE INTEREST OR PLEASURE IN DOING THINGS: 0
SUM OF ALL RESPONSES TO PHQ QUESTIONS 1-9: 0

## 2023-04-04 ASSESSMENT — ANXIETY QUESTIONNAIRES
2. NOT BEING ABLE TO STOP OR CONTROL WORRYING: 0
5. BEING SO RESTLESS THAT IT IS HARD TO SIT STILL: 0
4. TROUBLE RELAXING: 0
IF YOU CHECKED OFF ANY PROBLEMS ON THIS QUESTIONNAIRE, HOW DIFFICULT HAVE THESE PROBLEMS MADE IT FOR YOU TO DO YOUR WORK, TAKE CARE OF THINGS AT HOME, OR GET ALONG WITH OTHER PEOPLE: NOT DIFFICULT AT ALL
3. WORRYING TOO MUCH ABOUT DIFFERENT THINGS: 0
1. FEELING NERVOUS, ANXIOUS, OR ON EDGE: 0
GAD7 TOTAL SCORE: 0
7. FEELING AFRAID AS IF SOMETHING AWFUL MIGHT HAPPEN: 0
6. BECOMING EASILY ANNOYED OR IRRITABLE: 0

## 2023-04-04 NOTE — PROGRESS NOTES
7. Subdural hematoma ([facility] [address] [address]    8. Long term current use of anticoagulant    9. Mixed hyperlipidemia    10. Personal history of DVT (deep vein thrombosis)    11. Screening for prostate cancer        Briseyda Signs was seen today for annual exam.    Diagnoses and all orders for this visit:    HTN (hypertension), benign  -     CBC with Auto Differential; Future    BP stable. Encouraged decreasing sodium in the diet, weight loss, and gradual increases in exercise at least 20 minutes daily to further benefit BP    Ulcer of foot, unspecified laterality, unspecified ulcer stage (HCC)    Resolved. Monitor. Pulmonary embolism, other, unspecified chronicity, unspecified whether acute cor pulmonale present ([facility] [address]    Stable. Continues on coumadin for anticoagulation. Atrial fibrillation, unspecified type (HCC)  PAF (paroxysmal atrial fibrillation) (HCC)  Secondary hypercoagulable state ([facility] [address]  Long term current use of anticoagulant  Personal history of DVT (deep vein thrombosis)  -     CBC with Auto Differential; Future  -     Comprehensive Metabolic Panel; Future    Rate controlled. Continues on coumadin. Routinely controlled INR. Subdural hematoma (HCC)    Resolved. Symptoms improved. Mixed hyperlipidemia  -     Hemoglobin A1C; Future    Stable. Continue statin. Encouraged decreasing fatty, cholesterol rich foods in the diet (I.e. Red meats, butter, whole fat milk). Dietary choices like olive oil instead of butter, baked foods instead of fried can help to further prevent future elevations. Foods high in omega fatty acids can help to further decrease lipids additionally. Emphasis placed on incorporating fish, lean proteins (chicken, turkey, pork), fresh fruits and vegetables. Screening for prostate cancer  -     PSA, Prostatic Specific Antigen; Future        No follow-ups on file. Patientshould call the office immediately with new or ongoing signs or symptoms or worsening, or proceed to the emergency room.   If

## 2023-04-05 ENCOUNTER — ANTI-COAG VISIT (OUTPATIENT)
Dept: PHARMACY | Age: 63
End: 2023-04-05
Payer: COMMERCIAL

## 2023-04-05 DIAGNOSIS — Z79.01 LONG TERM CURRENT USE OF ANTICOAGULANT: Primary | ICD-10-CM

## 2023-04-05 LAB — INR BLD: 3.2

## 2023-04-05 PROCEDURE — 85610 PROTHROMBIN TIME: CPT

## 2023-04-05 PROCEDURE — 99211 OFF/OP EST MAY X REQ PHY/QHP: CPT

## 2023-04-05 NOTE — PROGRESS NOTES
Mr. Mirza has hx of multiple PE s/p 11/2009 and 6/2012. 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 He presents today w/out complaint. Pt. denies any s/sx bleeding/bruising/ swelling/SOB. Pt states he does get occasional nose bleeds. No missed doses. No changes in Rx/OTC/herbal medications. Pt denies EtOH use.   P:[phone], F: [phone]    Pt reports no changes . INR 3.2 is within goal range of 2-3. Recommend to continue Warfarin 7.5 mg on Thur and 5 mg all other days thereafter. Pt has 5 mg tablets  Will continue to monitor and check INR in 4 weeks.   Dosing reminder card given with phone number, appointment date and time  Return to clinic: 5/3 @ [phone]  Referring provider: Dr. Lalita Adams Only    Intervention Detail:   Total # of Interventions Recommended: 0  Total # of Interventions Accepted: 0  Time Spent (min): 15

## 2023-05-03 ENCOUNTER — ANTI-COAG VISIT (OUTPATIENT)
Dept: PHARMACY | Age: 63
End: 2023-05-03
Payer: COMMERCIAL

## 2023-05-03 DIAGNOSIS — Z79.01 LONG TERM CURRENT USE OF ANTICOAGULANT: Primary | ICD-10-CM

## 2023-05-03 LAB — INR BLD: 2.5

## 2023-05-03 PROCEDURE — 99211 OFF/OP EST MAY X REQ PHY/QHP: CPT

## 2023-05-03 PROCEDURE — 85610 PROTHROMBIN TIME: CPT

## 2023-05-03 NOTE — PROGRESS NOTES
Mr. Eri Bahena has hx of multiple PE s/p 11/2009 and 6/2012. 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 He presents today w/out complaint. Pt. denies any s/sx bleeding/bruising/ swelling/SOB. Pt states he does get occasional nose bleeds. No missed doses. No changes in Rx/OTC/herbal medications. Pt denies EtOH use.   P:[phone], F: [phone]    Pt reports no changes . INR 2.5 is within goal range of 2-3. Recommend to continue Warfarin 7.5 mg on Thur and 5 mg all other days thereafter. Pt has 5 mg tablets  Will continue to monitor and check INR in 4 weeks.   Dosing reminder card given with phone number, appointment date and time  Return to clinic: 5/3 @ [phone]  Referring provider: Dr. Luiz Hurtado Only    Intervention Detail:   Total # of Interventions Recommended: 0  Total # of Interventions Accepted: 0  Time Spent (min): 15

## 2023-06-07 ENCOUNTER — ANTI-COAG VISIT (OUTPATIENT)
Dept: PHARMACY | Age: 63
End: 2023-06-07
Payer: COMMERCIAL

## 2023-06-07 DIAGNOSIS — Z79.01 LONG TERM CURRENT USE OF ANTICOAGULANT: ICD-10-CM

## 2023-06-07 DIAGNOSIS — I26.99 PULMONARY EMBOLISM, UNSPECIFIED CHRONICITY, UNSPECIFIED PULMONARY EMBOLISM TYPE, UNSPECIFIED WHETHER ACUTE COR PULMONALE PRESENT (HCC): Primary | ICD-10-CM

## 2023-06-07 LAB — INR BLD: 2.6

## 2023-06-07 PROCEDURE — 99211 OFF/OP EST MAY X REQ PHY/QHP: CPT

## 2023-06-07 PROCEDURE — 85610 PROTHROMBIN TIME: CPT

## 2023-06-07 NOTE — PROGRESS NOTES
Mr. Kevin Marrufo has hx of multiple PE s/p 11/2009 and 6/2012. 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 He presents today w/out complaint. Pt. denies any s/sx bleeding/bruising/ swelling/SOB. Pt states he does get occasional nose bleeds. No missed doses. No changes in Rx/OTC/herbal medications. Pt denies EtOH use.   P:[phone], F: [phone]    Pt reports no changes . INR 2.6 is within goal range of 2-3. Recommend to continue Warfarin 7.5 mg on Thur and 5 mg all other days thereafter. Pt has 5 mg tablets  Will continue to monitor and check INR in 4 weeks.   Dosing reminder card given with phone number, appointment date and time  Return to clinic: 7/5/23 @ 1000  Referring provider: Dr. Sarah Altman  PharmD candidate    For Pharmacy Admin Tracking Only    Intervention Detail:   Total # of Interventions Recommended: 0  Total # of Interventions Accepted: 0  Time Spent (min): 15

## 2023-07-05 ENCOUNTER — ANTI-COAG VISIT (OUTPATIENT)
Dept: PHARMACY | Age: 63
End: 2023-07-05
Payer: COMMERCIAL

## 2023-07-05 DIAGNOSIS — Z79.01 LONG TERM CURRENT USE OF ANTICOAGULANT: ICD-10-CM

## 2023-07-05 DIAGNOSIS — I26.99 PULMONARY EMBOLISM, UNSPECIFIED CHRONICITY, UNSPECIFIED PULMONARY EMBOLISM TYPE, UNSPECIFIED WHETHER ACUTE COR PULMONALE PRESENT (HCC): Primary | ICD-10-CM

## 2023-07-05 LAB — INR BLD: 2.7

## 2023-07-05 PROCEDURE — 85610 PROTHROMBIN TIME: CPT | Performed by: FAMILY MEDICINE

## 2023-07-05 PROCEDURE — 99211 OFF/OP EST MAY X REQ PHY/QHP: CPT | Performed by: FAMILY MEDICINE

## 2023-07-05 NOTE — PROGRESS NOTES
Mr. Brandon Pretty has hx of multiple PE s/p 11/2009 and 6/2012. 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 He presents today w/out complaint. Pt. denies any s/sx bleeding/bruising/ swelling/SOB. Pt states he does get occasional nose bleeds. No missed doses. No changes in Rx/OTC/herbal medications. Pt denies EtOH use.   P:[phone], F: [phone]    Pt reports no changes . INR 2.7 is within goal range of 2-3. Recommend to continue Warfarin 7.5 mg on Thur and 5 mg all other days thereafter. Pt has 5 mg tablets  Will continue to monitor and check INR in 4 weeks.   Dosing reminder card given with phone number, appointment date and time  Return to clinic: 8/2 @ 10 am   Referring provider: Dr. Rustam Philip, PharmD 7/5/2023 9:45 AM

## 2023-08-02 ENCOUNTER — ANTI-COAG VISIT (OUTPATIENT)
Dept: PHARMACY | Age: 63
End: 2023-08-02
Payer: COMMERCIAL

## 2023-08-02 DIAGNOSIS — Z79.01 LONG TERM CURRENT USE OF ANTICOAGULANT: Primary | ICD-10-CM

## 2023-08-02 LAB — INR BLD: 2.7

## 2023-08-02 PROCEDURE — 85610 PROTHROMBIN TIME: CPT | Performed by: HOME HEALTH

## 2023-08-02 PROCEDURE — 99211 OFF/OP EST MAY X REQ PHY/QHP: CPT | Performed by: HOME HEALTH

## 2023-08-02 NOTE — PROGRESS NOTES
Mr. Jim Hamilton has hx of multiple PE s/p 11/2009 and 6/2012. 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 He presents today w/out complaint. Pt. denies any s/sx bleeding/bruising/ swelling/SOB. Pt states he does get occasional nose bleeds. No missed doses. No changes in Rx/OTC/herbal medications. Pt denies EtOH use.   P:[phone], F: [phone]    Pt reports no changes . INR 2.7 is within goal range of 2-3. Recommend to continue Warfarin 7.5 mg on Thur and 5 mg all other days thereafter. Pt has 5 mg tablets  Will continue to monitor and check INR in 4 weeks.   Dosing reminder card given with phone number, appointment date and time  Return to clinic: 9/16 @ 10 am   Referring provider: Dr. Any Ramirez Only    Intervention Detail:   Total # of Interventions Recommended: 0  Total # of Interventions Accepted: 0  Time Spent (min): 15

## 2023-08-03 LAB
CHOLEST SERPL-MCNC: 136 MG/DL (ref 0–199)
GLUCOSE SERPL-MCNC: 100 MG/DL (ref 70–99)
HDLC SERPL-MCNC: 39 MG/DL (ref 40–60)
LDLC SERPL CALC-MCNC: 64 MG/DL
TRIGL SERPL-MCNC: 166 MG/DL (ref 0–150)

## 2023-09-06 ENCOUNTER — ANTI-COAG VISIT (OUTPATIENT)
Dept: PHARMACY | Age: 63
End: 2023-09-06
Payer: COMMERCIAL

## 2023-09-06 DIAGNOSIS — Z79.01 LONG TERM CURRENT USE OF ANTICOAGULANT: Primary | ICD-10-CM

## 2023-09-06 LAB — INR BLD: 2.4

## 2023-09-06 PROCEDURE — 99211 OFF/OP EST MAY X REQ PHY/QHP: CPT | Performed by: HOME HEALTH

## 2023-09-06 PROCEDURE — 85610 PROTHROMBIN TIME: CPT | Performed by: HOME HEALTH

## 2023-09-06 NOTE — PROGRESS NOTES
MrArielle Strong has hx of multiple PE s/p 11/2009 and 6/2012. 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 He presents today w/out complaint. Pt. denies any s/sx bleeding/bruising/ swelling/SOB. Pt states he does get occasional nose bleeds. No missed doses. No changes in Rx/OTC/herbal medications. Pt denies EtOH use.   P:[phone], F: [phone]    Pt reports no changes . INR 2.4 is within goal range of 2-3. Recommend to continue Warfarin 7.5 mg on Thur and 5 mg all other days thereafter. Pt has 5 mg tablets  Will continue to monitor and check INR in 4 weeks.   Dosing reminder card given with phone number, appointment date and time  Return to clinic: 10/4 @ [phone]  Referring provider: Dr. Paul Zuñiga Only    Intervention Detail:   Total # of Interventions Recommended: 0  Total # of Interventions Accepted: 0  Time Spent (min): 15

## 2023-10-11 ENCOUNTER — ANTI-COAG VISIT (OUTPATIENT)
Dept: PHARMACY | Age: 63
End: 2023-10-11
Payer: COMMERCIAL

## 2023-10-11 DIAGNOSIS — Z79.01 LONG TERM CURRENT USE OF ANTICOAGULANT: Primary | ICD-10-CM

## 2023-10-11 LAB — INR BLD: 2.8

## 2023-10-11 PROCEDURE — 85610 PROTHROMBIN TIME: CPT | Performed by: HOME HEALTH

## 2023-10-11 PROCEDURE — 99211 OFF/OP EST MAY X REQ PHY/QHP: CPT | Performed by: HOME HEALTH

## 2023-10-11 NOTE — PROGRESS NOTES
Mr. Rohit Hernandez has hx of multiple PE s/p 11/2009 and 6/2012. 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 He presents today w/out complaint. Pt. denies any s/sx bleeding/bruising/ swelling/SOB. Pt states he does get occasional nose bleeds. No missed doses. No changes in Rx/OTC/herbal medications. Pt denies EtOH use.   P:[phone], F: [phone]    Pt reports no changes . INR 2.8 is within goal range of 2-3. Recommend to continue Warfarin 7.5 mg on Thur and 5 mg all other days thereafter. Pt has 5 mg tablets  Will continue to monitor and check INR in 4 weeks.   Dosing reminder card given with phone number, appointment date and time  Return to clinic: 11/8 @ [phone]  Referring provider: Dr. Medeiros List Only    Intervention Detail:   Total # of Interventions Recommended: 0  Total # of Interventions Accepted: 0  Time Spent (min): 15

## 2023-10-18 ENCOUNTER — OFFICE VISIT (OUTPATIENT)
Dept: INTERNAL MEDICINE CLINIC | Age: 63
End: 2023-10-18
Payer: COMMERCIAL

## 2023-10-18 VITALS
BODY MASS INDEX: 28.25 KG/M2 | WEIGHT: 232 LBS | HEART RATE: 86 BPM | HEIGHT: 76 IN | SYSTOLIC BLOOD PRESSURE: 124 MMHG | DIASTOLIC BLOOD PRESSURE: 80 MMHG | RESPIRATION RATE: 12 BRPM | OXYGEN SATURATION: 98 % | TEMPERATURE: 97 F

## 2023-10-18 DIAGNOSIS — I48.0 PAF (PAROXYSMAL ATRIAL FIBRILLATION) (HCC): Primary | ICD-10-CM

## 2023-10-18 DIAGNOSIS — D68.69 SECONDARY HYPERCOAGULABLE STATE (HCC): ICD-10-CM

## 2023-10-18 DIAGNOSIS — E78.2 MIXED HYPERLIPIDEMIA: ICD-10-CM

## 2023-10-18 DIAGNOSIS — Z79.01 LONG TERM CURRENT USE OF ANTICOAGULANT: ICD-10-CM

## 2023-10-18 DIAGNOSIS — I10 HTN (HYPERTENSION), BENIGN: ICD-10-CM

## 2023-10-18 DIAGNOSIS — K21.9 GASTROESOPHAGEAL REFLUX DISEASE WITHOUT ESOPHAGITIS: ICD-10-CM

## 2023-10-18 PROBLEM — R29.818 ACUTE FOCAL NEUROLOGICAL DEFICIT: Status: RESOLVED | Noted: 2022-01-07 | Resolved: 2023-10-18

## 2023-10-18 PROBLEM — S06.5XAA SUBDURAL HEMATOMA (HCC): Status: RESOLVED | Noted: 2020-06-17 | Resolved: 2023-10-18

## 2023-10-18 PROCEDURE — 3074F SYST BP LT 130 MM HG: CPT | Performed by: NURSE PRACTITIONER

## 2023-10-18 PROCEDURE — 99214 OFFICE O/P EST MOD 30 MIN: CPT | Performed by: NURSE PRACTITIONER

## 2023-10-18 PROCEDURE — 3079F DIAST BP 80-89 MM HG: CPT | Performed by: NURSE PRACTITIONER

## 2023-10-18 SDOH — ECONOMIC STABILITY: FOOD INSECURITY: WITHIN THE PAST 12 MONTHS, YOU WORRIED THAT YOUR FOOD WOULD RUN OUT BEFORE YOU GOT MONEY TO BUY MORE.: NEVER TRUE

## 2023-10-18 SDOH — ECONOMIC STABILITY: INCOME INSECURITY: HOW HARD IS IT FOR YOU TO PAY FOR THE VERY BASICS LIKE FOOD, HOUSING, MEDICAL CARE, AND HEATING?: NOT HARD AT ALL

## 2023-10-18 SDOH — ECONOMIC STABILITY: FOOD INSECURITY: WITHIN THE PAST 12 MONTHS, THE FOOD YOU BOUGHT JUST DIDN'T LAST AND YOU DIDN'T HAVE MONEY TO GET MORE.: NEVER TRUE

## 2023-10-18 ASSESSMENT — ENCOUNTER SYMPTOMS
COUGH: 0
DIARRHEA: 0
SHORTNESS OF BREATH: 0
CONSTIPATION: 0
CHEST TIGHTNESS: 0
SORE THROAT: 0
SINUS PAIN: 0
NAUSEA: 0
VOMITING: 0

## 2023-10-18 NOTE — PROGRESS NOTES
[address] [facility] Internal Medicine  [facility], [facility]  Tel:[phone]    Channing Fuller is a 58 y.o. male who presents today for his medical conditions/complaints as noted below. Channing Fuller is c/o of Follow-up (6 month)      Chief Complaint   Patient presents with    Follow-up     6 month       HPI:      Mr. Loida Myrick presents for his annual well visit. He states he is overall well. Continues to work with the [employer]. Enjoys his job. Anticoags dosing stable from previously. Continues his dose daily. Tries to stay away from greens/vit k sources. Denies excessive bleeding. Denies remnant stroke symptoms from his previous CVA last year. Able to continue on his daily work/life routine. Dyslipidemia  Patient presents for evaluation of lipids. Compliance with treatment thus far has been excellent. A repeat fasting lipid profile was not done. The patient does not use medications that may worsen dyslipidemias (corticosteroids, progestins, anabolic steroids, diuretics, beta-blockers, amiodarone, cyclosporine, olanzapine). The patient exercises frequently. The patient is not known to have coexisting coronary artery disease. Cardiac Risk Factors  Age > 45-male, > 55-female:   YES  +1  Smoking:          NO  Sig. family hx of CHD*:            YES  +1  Hypertension:   NO  Diabetes:          NO  HDL < 35:         NO  HDL > 59:         NO  Total:   2  *- Significant family history of Coronary Heart Disease per National Cholesterol Education Program = Myocardial Infarction or sudden death at less than 54years old in   father or other 1st-degree male relative, or less than 72years old in mother or    other 1st-degree female relative.         Past Medical History:   Diagnosis Date    Abnormal stress test 5/11/2014    Acute focal neurological deficit 1/7/2022    Acute hyponatremia 1/7/2022    Asthma     Bone spur of right

## 2023-11-08 ENCOUNTER — ANTI-COAG VISIT (OUTPATIENT)
Dept: PHARMACY | Age: 63
End: 2023-11-08
Payer: COMMERCIAL

## 2023-11-08 DIAGNOSIS — Z79.01 LONG TERM CURRENT USE OF ANTICOAGULANT: Primary | ICD-10-CM

## 2023-11-08 LAB — INR BLD: 2.8

## 2023-11-08 PROCEDURE — 85610 PROTHROMBIN TIME: CPT | Performed by: HOME HEALTH

## 2023-11-08 PROCEDURE — 99211 OFF/OP EST MAY X REQ PHY/QHP: CPT | Performed by: HOME HEALTH

## 2023-11-08 NOTE — PROGRESS NOTES
Mr. Yola Bryant has hx of multiple PE s/p 11/2009 and 6/2012. 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 He presents today w/out complaint. Pt. denies any s/sx bleeding/bruising/ swelling/SOB. Pt states he does get occasional nose bleeds. No missed doses. No changes in Rx/OTC/herbal medications. Pt denies EtOH use.   P:[phone], F: [phone]    Pt reports no changes . INR 2.8 is within goal range of 2-3. Recommend to continue Warfarin 7.5 mg on Thur and 5 mg all other days thereafter. Pt has 5 mg tablets  Will continue to monitor and check INR in 4 weeks.   Dosing reminder card given with phone number, appointment date and time  Return to clinic: 12/6   Referring provider: Dr. Tanesha Nolan Only    Intervention Detail:   Total # of Interventions Recommended: 0  Total # of Interventions Accepted: 0  Time Spent (min): 15

## 2023-12-06 ENCOUNTER — ANTI-COAG VISIT (OUTPATIENT)
Dept: PHARMACY | Age: 63
End: 2023-12-06
Payer: COMMERCIAL

## 2023-12-06 DIAGNOSIS — Z79.01 LONG TERM CURRENT USE OF ANTICOAGULANT: Primary | ICD-10-CM

## 2023-12-06 LAB — INTERNATIONAL NORMALIZATION RATIO, POC: 2.9

## 2023-12-06 PROCEDURE — 85610 PROTHROMBIN TIME: CPT | Performed by: INTERNAL MEDICINE

## 2023-12-06 PROCEDURE — 99211 OFF/OP EST MAY X REQ PHY/QHP: CPT | Performed by: INTERNAL MEDICINE

## 2023-12-06 NOTE — PROGRESS NOTES
Mr. Sana Pham has hx of multiple PE s/p 2009 and 2012. 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 He presents today w/out complaint. Pt. denies any s/sx bleeding/bruising/ swelling/SOB. Pt states he does get occasional nose bleeds. No missed doses. No changes in Rx/OTC/herbal medications. Pt denies EtOH use. Pt reports no changes . INR 2.9 is within goal range of 2-3. Recommend to continue Warfarin 7.5 mg on Th and 5 mg all other days thereafter. Pt has 5 mg tablets  Will continue to monitor and check INR in 4 weeks.   Dosing reminder card given with phone number, appointment date and time  Return to clinic: 1/3 at [facility]   Referring provider: Dr. Danis Wallace Only    Intervention Detail: Adherence Monitorin  Total # of Interventions Recommended: 1  Total # of Interventions Accepted: 1  Time Spent (min): 15

## 2024-01-15 ENCOUNTER — ANTI-COAG VISIT (OUTPATIENT)
Dept: PHARMACY | Age: 64
End: 2024-01-15
Payer: MEDICARE

## 2024-01-15 DIAGNOSIS — Z79.01 LONG TERM CURRENT USE OF ANTICOAGULANT: Primary | ICD-10-CM

## 2024-01-15 LAB — INTERNATIONAL NORMALIZATION RATIO, POC: 2.4

## 2024-01-15 PROCEDURE — 85610 PROTHROMBIN TIME: CPT | Performed by: INTERNAL MEDICINE

## 2024-01-15 PROCEDURE — 99211 OFF/OP EST MAY X REQ PHY/QHP: CPT | Performed by: INTERNAL MEDICINE

## 2024-01-15 NOTE — PROGRESS NOTES
Mr. Ferrara has hx of multiple PE s/p 2009 and 2012.  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   He presents today w/out complaint.  Pt. denies any s/sx bleeding/bruising/ swelling/SOB.   Pt states he does get occasional nose bleeds.   No missed doses.  No changes in Rx/OTC/herbal medications.  Pt denies EtOH use.    Pt reports no changes .    INR 2.4 is within goal range of 2-3.  Recommend to continue Warfarin 7.5 mg on Thur and 5 mg all other days thereafter.   Pt has 5 mg tablets  Will continue to monitor and check INR in 4 weeks.  Dosing reminder card given with phone number, appointment date and time  Return to clinic:  at 1000a   Referring provider: Dr. Pamela Briones    For Pharmacy Admin Tracking Only    Intervention Detail: Adherence Monitorin  Total # of Interventions Recommended: 1  Total # of Interventions Accepted: 1  Time Spent (min): 15

## 2024-02-14 ENCOUNTER — ANTI-COAG VISIT (OUTPATIENT)
Dept: PHARMACY | Age: 64
End: 2024-02-14
Payer: MEDICARE

## 2024-02-14 DIAGNOSIS — Z79.01 LONG TERM CURRENT USE OF ANTICOAGULANT: Primary | ICD-10-CM

## 2024-02-14 LAB — INR BLD: 2

## 2024-02-14 PROCEDURE — 99211 OFF/OP EST MAY X REQ PHY/QHP: CPT | Performed by: FAMILY MEDICINE

## 2024-02-14 PROCEDURE — 85610 PROTHROMBIN TIME: CPT | Performed by: FAMILY MEDICINE

## 2024-02-14 NOTE — PROGRESS NOTES
Mr. Ferrara has hx of multiple PE s/p 2009 and 2012.  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   He presents today w/out complaint.  Pt. denies any s/sx bleeding/bruising/ swelling/SOB.   Pt states he does get occasional nose bleeds.   No missed doses.  No changes in Rx/OTC/herbal medications.  Pt denies EtOH use.    Pt reports no changes .    INR 2.0 is within goal range of 2-3.  Recommend to continue Warfarin 7.5 mg on Thur and 5 mg all other days thereafter.   Pt has 5 mg tablets  Will continue to monitor and check INR in 4 weeks.  Dosing reminder card given with phone number, appointment date and time  Return to clinic: 3/13 @ 1015 am   Referring provider: Dr. Pamela Briones  Referral Date: 23     Cade Andrews, PharmD 2024 9:53 AM    For Pharmacy Admin Tracking Only    Intervention Detail: Adherence Monitorin  Total # of Interventions Recommended: 1  Total # of Interventions Accepted: 1  Time Spent (min): 15

## 2024-02-19 DIAGNOSIS — E78.2 MIXED HYPERLIPIDEMIA: ICD-10-CM

## 2024-02-19 RX ORDER — WARFARIN SODIUM 5 MG/1
TABLET ORAL
Qty: 24 TABLET | Refills: 2 | Status: SHIPPED | OUTPATIENT
Start: 2024-02-19

## 2024-02-19 RX ORDER — ATORVASTATIN CALCIUM 40 MG/1
40 TABLET, FILM COATED ORAL NIGHTLY
Qty: 90 TABLET | Refills: 3 | Status: SHIPPED | OUTPATIENT
Start: 2024-02-19

## 2024-02-19 NOTE — TELEPHONE ENCOUNTER
Refill request for WARFARIN SODIUM 5 MG TABLET medication.     Name of Pharmacy- [facility]      Last visit - 10-     Pending visit - 4-    Last refill - 1-      Medication Contract signed -   Last Oarrs ran-         Additional Comments

## 2024-02-19 NOTE — TELEPHONE ENCOUNTER
Refill request for Atorvastiatin 40 mg  medication.     Name of [facility]- [facility]       Last visit - 10/18/23     Pending visit - 4/22/24    Last refill -3/7/22      Medication Contract signed -   Last Oarrs ran-         Additional Comments

## 2024-03-13 ENCOUNTER — ANTI-COAG VISIT (OUTPATIENT)
Dept: PHARMACY | Age: 64
End: 2024-03-13
Payer: MEDICARE

## 2024-03-13 DIAGNOSIS — Z79.01 LONG TERM CURRENT USE OF ANTICOAGULANT: Primary | ICD-10-CM

## 2024-03-13 LAB — INTERNATIONAL NORMALIZATION RATIO, POC: 2.5

## 2024-03-13 PROCEDURE — 85610 PROTHROMBIN TIME: CPT | Performed by: INTERNAL MEDICINE

## 2024-03-13 PROCEDURE — 99211 OFF/OP EST MAY X REQ PHY/QHP: CPT | Performed by: INTERNAL MEDICINE

## 2024-03-13 NOTE — PROGRESS NOTES
Mr. Ferrara has hx of multiple PE s/p 2009 and 2012.  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   He presents today w/out complaint.  Pt. denies any s/sx bleeding/bruising/ swelling/SOB.   Pt states he does get occasional nose bleeds.   No missed doses.  No changes in Rx/OTC/herbal medications.  Pt denies EtOH use.    Pt reports no changes .    INR 2.5 is within goal range of 2-3.  Recommend to continue Warfarin 7.5 mg on Thur and 5 mg all other days thereafter.   Pt has 5 mg tablets  Will continue to monitor and check INR in 4 weeks.  Dosing reminder card given with phone number, appointment date and time  Return to clinic: 4/10 @ 915 am     Referring provider: Enrique Pike CNP  Referral Date: 23       For Pharmacy Admin Tracking Only    Intervention Detail: Adherence Monitorin  Total # of Interventions Recommended: 1  Total # of Interventions Accepted: 1  Time Spent (min): 15

## 2024-04-10 ENCOUNTER — ANTI-COAG VISIT (OUTPATIENT)
Dept: PHARMACY | Age: 64
End: 2024-04-10
Payer: MEDICARE

## 2024-04-10 DIAGNOSIS — Z79.01 LONG TERM CURRENT USE OF ANTICOAGULANT: Primary | ICD-10-CM

## 2024-04-10 LAB — INTERNATIONAL NORMALIZATION RATIO, POC: 2.4

## 2024-04-10 PROCEDURE — 99211 OFF/OP EST MAY X REQ PHY/QHP: CPT | Performed by: INTERNAL MEDICINE

## 2024-04-10 PROCEDURE — 85610 PROTHROMBIN TIME: CPT | Performed by: INTERNAL MEDICINE

## 2024-04-10 NOTE — PROGRESS NOTES
Mr. Ferrara has hx of multiple PE s/p 2009 and 2012.  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   He presents today w/out complaint.  Pt. denies any s/sx bleeding/bruising/ swelling/SOB.   Pt states he does get occasional nose bleeds.   No missed doses.  No changes in Rx/OTC/herbal medications.  Pt denies EtOH use.    Pt reports no changes .    INR 2.4 is within goal range of 2-3.  Recommend to continue Warfarin 7.5 mg on Thur and 5 mg all other days.   Pt has 5 mg tablets  Will continue to monitor and check INR in 4 weeks.  Dosing reminder card given with phone number, appointment date and time  Return to clinic:  @ 945 am     Referring provider: Enrique Pike CNP  Referral Date: 23 - faxed for new referral      For Pharmacy Admin Tracking Only    Intervention Detail: Adherence Monitorin  Total # of Interventions Recommended: 1  Total # of Interventions Accepted: 1  Time Spent (min): 15

## 2024-04-12 ENCOUNTER — OFFICE VISIT (OUTPATIENT)
Dept: INTERNAL MEDICINE CLINIC | Age: 64
End: 2024-04-12
Payer: MEDICARE

## 2024-04-12 VITALS
SYSTOLIC BLOOD PRESSURE: 126 MMHG | BODY MASS INDEX: 28.45 KG/M2 | WEIGHT: 233.6 LBS | HEIGHT: 76 IN | OXYGEN SATURATION: 98 % | DIASTOLIC BLOOD PRESSURE: 78 MMHG | TEMPERATURE: 97.3 F | HEART RATE: 86 BPM

## 2024-04-12 DIAGNOSIS — D68.69 SECONDARY HYPERCOAGULABLE STATE (HCC): ICD-10-CM

## 2024-04-12 DIAGNOSIS — J40 BRONCHITIS: Primary | ICD-10-CM

## 2024-04-12 DIAGNOSIS — I48.0 PAF (PAROXYSMAL ATRIAL FIBRILLATION) (HCC): ICD-10-CM

## 2024-04-12 DIAGNOSIS — L97.509 ULCER OF FOOT, UNSPECIFIED LATERALITY, UNSPECIFIED ULCER STAGE (HCC): ICD-10-CM

## 2024-04-12 PROCEDURE — 1123F ACP DISCUSS/DSCN MKR DOCD: CPT | Performed by: NURSE PRACTITIONER

## 2024-04-12 PROCEDURE — 3017F COLORECTAL CA SCREEN DOC REV: CPT | Performed by: NURSE PRACTITIONER

## 2024-04-12 PROCEDURE — 3074F SYST BP LT 130 MM HG: CPT | Performed by: NURSE PRACTITIONER

## 2024-04-12 PROCEDURE — G8419 CALC BMI OUT NRM PARAM NOF/U: HCPCS | Performed by: NURSE PRACTITIONER

## 2024-04-12 PROCEDURE — 99213 OFFICE O/P EST LOW 20 MIN: CPT | Performed by: NURSE PRACTITIONER

## 2024-04-12 PROCEDURE — 1036F TOBACCO NON-USER: CPT | Performed by: NURSE PRACTITIONER

## 2024-04-12 PROCEDURE — G8427 DOCREV CUR MEDS BY ELIG CLIN: HCPCS | Performed by: NURSE PRACTITIONER

## 2024-04-12 PROCEDURE — 3078F DIAST BP <80 MM HG: CPT | Performed by: NURSE PRACTITIONER

## 2024-04-12 RX ORDER — DOXYCYCLINE HYCLATE 100 MG
100 TABLET ORAL 2 TIMES DAILY
Qty: 14 TABLET | Refills: 0 | Status: SHIPPED | OUTPATIENT
Start: 2024-04-12 | End: 2024-04-19

## 2024-04-12 RX ORDER — BENZONATATE 100 MG/1
100 CAPSULE ORAL 3 TIMES DAILY PRN
Qty: 42 CAPSULE | Refills: 0 | Status: SHIPPED | OUTPATIENT
Start: 2024-04-12 | End: 2024-04-26

## 2024-04-12 ASSESSMENT — ENCOUNTER SYMPTOMS
SORE THROAT: 0
CONSTIPATION: 0
VOMITING: 0
DIARRHEA: 0
SINUS PAIN: 0
NAUSEA: 0
SHORTNESS OF BREATH: 1
SINUS PRESSURE: 1
WHEEZING: 0
COUGH: 1
CHEST TIGHTNESS: 1

## 2024-04-12 ASSESSMENT — PATIENT HEALTH QUESTIONNAIRE - PHQ9
2. FEELING DOWN, DEPRESSED OR HOPELESS: MORE THAN HALF THE DAYS
7. TROUBLE CONCENTRATING ON THINGS, SUCH AS READING THE NEWSPAPER OR WATCHING TELEVISION: NOT AT ALL
SUM OF ALL RESPONSES TO PHQ QUESTIONS 1-9: 14
3. TROUBLE FALLING OR STAYING ASLEEP: MORE THAN HALF THE DAYS
SUM OF ALL RESPONSES TO PHQ9 QUESTIONS 1 & 2: 4
4. FEELING TIRED OR HAVING LITTLE ENERGY: NEARLY EVERY DAY
9. THOUGHTS THAT YOU WOULD BE BETTER OFF DEAD, OR OF HURTING YOURSELF: NOT AT ALL
10. IF YOU CHECKED OFF ANY PROBLEMS, HOW DIFFICULT HAVE THESE PROBLEMS MADE IT FOR YOU TO DO YOUR WORK, TAKE CARE OF THINGS AT HOME, OR GET ALONG WITH OTHER PEOPLE: NOT DIFFICULT AT ALL
SUM OF ALL RESPONSES TO PHQ QUESTIONS 1-9: 14
1. LITTLE INTEREST OR PLEASURE IN DOING THINGS: MORE THAN HALF THE DAYS
6. FEELING BAD ABOUT YOURSELF - OR THAT YOU ARE A FAILURE OR HAVE LET YOURSELF OR YOUR FAMILY DOWN: SEVERAL DAYS
SUM OF ALL RESPONSES TO PHQ QUESTIONS 1-9: 14
SUM OF ALL RESPONSES TO PHQ QUESTIONS 1-9: 14
5. POOR APPETITE OR OVEREATING: MORE THAN HALF THE DAYS
8. MOVING OR SPEAKING SO SLOWLY THAT OTHER PEOPLE COULD HAVE NOTICED. OR THE OPPOSITE, BEING SO FIGETY OR RESTLESS THAT YOU HAVE BEEN MOVING AROUND A LOT MORE THAN USUAL: MORE THAN HALF THE DAYS

## 2024-04-12 NOTE — PROGRESS NOTES
[facility]YSIC[facility]ACT[facility]  [facility] Internal Medicine  [address], [place] [address]  Tel:[phone]    Pete Ferrara is a 64 y.o. male who presents today for his medical conditions/complaints as noted below.  Pete Ferrara is c/o of Other (Woke up this morning not feeling well. Dry cough which caused chest to hurt.  Coughed up clot of blood this morning.  L arm pain.  )      Chief Complaint   Patient presents with    Other     Woke up this morning not feeling well. Dry cough which caused chest to hurt.  Coughed up clot of blood this morning.  L arm pain.         HPI:     Pete Ferrara is a 64 y.o. male here for evaluation of a cough. Onset of symptoms was 2 weeks ago. Symptoms have been unchanged since that time. The cough is hoarse and productive of blood streaked sputum and is aggravated by exercise. Associated symptoms include: chest pain and sputum production. Patient does not have a history of asthma. Patient does have a history of environmental allergens. Patient has not traveled recently. Patient does not have a history of smoking. Patient has not had a previous chest x-ray. States he has been treating with OTC cough suppressants which has helped his symptoms    Patient's medications, allergies, past medical, surgical, social and family histories were reviewed and updated as appropriate.        Past Medical History:   Diagnosis Date    Abnormal stress test 5/11/2014    Acute focal neurological deficit 1/7/2022    Acute hyponatremia 1/7/2022    Asthma     Bone spur of right foot 1/28/2015    Cancer (HCC)     skin    Cellulitis and abscess of foot, except toes 1/28/2015    Cerebrovascular accident (CVA) ([facility])     Chest pain 5/10/2014    Deep vein thrombosis (DVT) ([facility]) in legs and traveled to lungs    Dizziness     FH: CAD (coronary artery disease) 1/8/2014    Former smoker 1/8/2014    GERD (gastroesophageal reflux disease)     H/o Pulmonary embolus 6/3/2012    High cholesterol

## 2024-04-25 ENCOUNTER — OFFICE VISIT (OUTPATIENT)
Dept: INTERNAL MEDICINE CLINIC | Age: 64
End: 2024-04-25
Payer: MEDICARE

## 2024-04-25 VITALS
BODY MASS INDEX: 28.84 KG/M2 | DIASTOLIC BLOOD PRESSURE: 82 MMHG | WEIGHT: 236.8 LBS | OXYGEN SATURATION: 97 % | HEART RATE: 80 BPM | HEIGHT: 76 IN | SYSTOLIC BLOOD PRESSURE: 136 MMHG | TEMPERATURE: 98.1 F

## 2024-04-25 DIAGNOSIS — I10 HTN (HYPERTENSION), BENIGN: Primary | ICD-10-CM

## 2024-04-25 DIAGNOSIS — I48.0 PAF (PAROXYSMAL ATRIAL FIBRILLATION) (HCC): ICD-10-CM

## 2024-04-25 DIAGNOSIS — E78.2 MIXED HYPERLIPIDEMIA: ICD-10-CM

## 2024-04-25 DIAGNOSIS — D68.69 SECONDARY HYPERCOAGULABLE STATE (HCC): ICD-10-CM

## 2024-04-25 DIAGNOSIS — Z00.00 WELCOME TO MEDICARE PREVENTIVE VISIT: ICD-10-CM

## 2024-04-25 DIAGNOSIS — K21.9 GASTROESOPHAGEAL REFLUX DISEASE WITHOUT ESOPHAGITIS: ICD-10-CM

## 2024-04-25 PROCEDURE — 3075F SYST BP GE 130 - 139MM HG: CPT | Performed by: NURSE PRACTITIONER

## 2024-04-25 PROCEDURE — G0402 INITIAL PREVENTIVE EXAM: HCPCS | Performed by: NURSE PRACTITIONER

## 2024-04-25 PROCEDURE — 3079F DIAST BP 80-89 MM HG: CPT | Performed by: NURSE PRACTITIONER

## 2024-04-25 PROCEDURE — 3017F COLORECTAL CA SCREEN DOC REV: CPT | Performed by: NURSE PRACTITIONER

## 2024-04-25 PROCEDURE — 1123F ACP DISCUSS/DSCN MKR DOCD: CPT | Performed by: NURSE PRACTITIONER

## 2024-04-25 ASSESSMENT — LIFESTYLE VARIABLES
HOW MANY STANDARD DRINKS CONTAINING ALCOHOL DO YOU HAVE ON A TYPICAL DAY: PATIENT DOES NOT DRINK
HOW OFTEN DO YOU HAVE A DRINK CONTAINING ALCOHOL: NEVER

## 2024-04-25 NOTE — PROGRESS NOTES
symmetric, no cranial nerve deficit, gait, coordination and speech normal       Allergies   Allergen Reactions    Bee Pollen Other (See Comments)    Bee Venom Anaphylaxis     Has epi pen    Nutritional Supplements Anaphylaxis     Pt states he is not allergic to nutritional supplements    Penicillins Hives     Prior to Visit Medications    Medication Sig Taking? Authorizing Provider   benzonatate (TESSALON) 100 MG capsule Take 1 capsule by mouth 3 times daily as needed for Cough Yes Enrique Pike APRN - CNP   warfarin (COUMADIN) 5 MG tablet TAKE 1 TABLET BY MOUTH DAILY EXCEPT ON SUNDAY TAKE 1/2 TABLET OR AS DIRECTED BY CLINIC Yes Enrique Pike APRN - CNP   atorvastatin (LIPITOR) 40 MG tablet Take 1 tablet by mouth nightly Yes Enrique Pike APRN - CNP   aspirin 81 MG EC tablet Take 1 tablet by mouth daily Yes Hermann Clayton MD   omeprazole (PRILOSEC) 10 MG delayed release capsule Take 1 capsule by mouth daily Yes Provider, Historical, MD       CareTeam (Including outside providers/suppliers regularly involved in providing care):   Patient Care Team:  Enrique Pike APRN - CNP as PCP - General (Nurse Practitioner)  Enrique Pike APRN - CNP as PCP - Empaneled Provider  Sin Goldstein MD as Consulting Physician (Pulmonology)     Reviewed and updated this visit:  Tobacco  Allergies  Meds  Problems  Med Hx  Surg Hx  Soc Hx  Fam Hx

## 2024-05-01 RX ORDER — WARFARIN SODIUM 5 MG/1
TABLET ORAL
Qty: 24 TABLET | Refills: 2 | Status: SHIPPED | OUTPATIENT
Start: 2024-05-01

## 2024-05-01 NOTE — TELEPHONE ENCOUNTER
Refill request for Warfarin Sodium 5 mg  medication.     Name [facility]      Last visit - 4/25/24     Pending visit - 10/24/24    Last refill -2/19/24      Medication Contract signed -   Last Oarrs ran-         Additional Comments

## 2024-05-08 ENCOUNTER — ANTI-COAG VISIT (OUTPATIENT)
Dept: PHARMACY | Age: 64
End: 2024-05-08
Payer: MEDICARE

## 2024-05-08 DIAGNOSIS — Z79.01 LONG TERM CURRENT USE OF ANTICOAGULANT: Primary | ICD-10-CM

## 2024-05-08 LAB — INTERNATIONAL NORMALIZATION RATIO, POC: 2.3

## 2024-05-08 PROCEDURE — 99211 OFF/OP EST MAY X REQ PHY/QHP: CPT

## 2024-05-08 PROCEDURE — 85610 PROTHROMBIN TIME: CPT

## 2024-05-08 NOTE — PROGRESS NOTES
Mr. Ferrara has hx of multiple PE s/p 2009 and 2012.  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   He presents today w/out complaint.  Pt. denies any s/sx bleeding/bruising/ swelling/SOB.   Pt states he does get occasional nose bleeds.   No missed doses.  No changes in Rx/OTC/herbal medications.  Pt denies EtOH use.    Pt reports no changes .    INR 2.3 is within goal range of 2-3.  Recommend to continue Warfarin 7.5 mg on Th and 5 mg all other days.   Pt has 5 mg tablets  Will continue to monitor and check INR in 4 weeks.  Dosing reminder card given with phone number, appointment date and time  Return to clinic:  @ 930 am     Referring provider: Enrique Pike CNP  Referral Date: 23 - faxed for new referral    Alycia March, Prieto  2024 at 1:18 PM      For Pharmacy Admin Tracking Only    Intervention Detail: Adherence Monitorin  Total # of Interventions Recommended: 1  Total # of Interventions Accepted: 1  Time Spent (min): 15

## 2024-06-05 ENCOUNTER — ANTI-COAG VISIT (OUTPATIENT)
Dept: PHARMACY | Age: 64
End: 2024-06-05
Payer: MEDICARE

## 2024-06-05 DIAGNOSIS — Z79.01 LONG TERM CURRENT USE OF ANTICOAGULANT: Primary | ICD-10-CM

## 2024-06-05 LAB — INR BLD: 2.8

## 2024-06-05 PROCEDURE — 85610 PROTHROMBIN TIME: CPT | Performed by: FAMILY MEDICINE

## 2024-06-05 PROCEDURE — 99211 OFF/OP EST MAY X REQ PHY/QHP: CPT | Performed by: FAMILY MEDICINE

## 2024-06-05 NOTE — PROGRESS NOTES
Mr. Ferrara has hx of multiple PE s/p 2009 and 2012.  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   He presents today w/out complaint.  Pt. denies any s/sx bleeding/bruising/ swelling/SOB.   Pt states he does get occasional nose bleeds.   No missed doses.  No changes in Rx/OTC/herbal medications.  Pt denies EtOH use.    Pt reports no changes .    INR 2.8 is within goal range of 2-3.  Recommend to continue Warfarin 7.5 mg on Thur and 5 mg all other days.   Pt has 5 mg tablets  Will continue to monitor and check INR in 4 weeks.  Dosing reminder card given with phone number, appointment date and time  Return to clinic: 7/3 @ 945 am     Referring provider: Enrique Pike CNP  Referral Date: 23 - faxed for new referral    Cade Andrews PharmD 2024 9:22 AM      For Pharmacy Admin Tracking Only    Intervention Detail: Adherence Monitorin  Total # of Interventions Recommended: 1  Total # of Interventions Accepted: 1  Time Spent (min): 15

## 2024-06-21 RX ORDER — WARFARIN SODIUM 5 MG/1
TABLET ORAL
Qty: 24 TABLET | Refills: 2 | Status: SHIPPED | OUTPATIENT
Start: 2024-06-21

## 2024-06-21 NOTE — TELEPHONE ENCOUNTER
Refill request for Warfarin ( Coumadin) 5 mg  medication.     Name [facility]      Last visit - 4/25/24     Pending visit - 10/24/24    Last refill -5/1/24      Medication Contract signed -   Last Oarrs ran-         Additional Comments

## 2024-07-03 ENCOUNTER — ANTI-COAG VISIT (OUTPATIENT)
Dept: PHARMACY | Age: 64
End: 2024-07-03
Payer: MEDICARE

## 2024-07-03 ENCOUNTER — HOSPITAL ENCOUNTER (OUTPATIENT)
Age: 64
Discharge: HOME | End: 2024-07-03
Payer: MEDICARE

## 2024-07-03 DIAGNOSIS — Z00.00 WELCOME TO MEDICARE PREVENTIVE VISIT: ICD-10-CM

## 2024-07-03 DIAGNOSIS — Z79.01 LONG TERM CURRENT USE OF ANTICOAGULANT: Primary | ICD-10-CM

## 2024-07-03 LAB
ALBUMIN SERPL-MCNC: 4.4 G/DL (ref 3.4–5)
ALBUMIN/GLOB SERPL: 1.3 {RATIO} (ref 1.1–2.2)
ALP SERPL-CCNC: 77 U/L (ref 40–129)
ALT SERPL-CCNC: 30 U/L (ref 10–40)
ANION GAP SERPL CALCULATED.3IONS-SCNC: 14 MMOL/L (ref 3–16)
AST SERPL-CCNC: 24 U/L (ref 15–37)
BASOPHILS # BLD: 0 K/UL (ref 0–0.2)
BASOPHILS NFR BLD: 0.8 %
BILIRUB SERPL-MCNC: 0.6 MG/DL (ref 0–1)
BUN SERPL-MCNC: 10 MG/DL (ref 7–20)
CALCIUM SERPL-MCNC: 9.5 MG/DL (ref 8.3–10.6)
CHLORIDE SERPL-SCNC: 106 MMOL/L (ref 99–110)
CHOLEST SERPL-MCNC: 165 MG/DL (ref 0–199)
CO2 SERPL-SCNC: 24 MMOL/L (ref 21–32)
CREAT SERPL-MCNC: 0.8 MG/DL (ref 0.8–1.3)
DEPRECATED RDW RBC AUTO: 13.2 % (ref 12.4–15.4)
EOSINOPHIL # BLD: 0.3 K/UL (ref 0–0.6)
EOSINOPHIL NFR BLD: 4.8 %
GFR SERPLBLD CREATININE-BSD FMLA CKD-EPI: >90 ML/MIN/{1.73_M2}
GLUCOSE SERPL-MCNC: 92 MG/DL (ref 70–99)
HCT VFR BLD AUTO: 46.2 % (ref 40.5–52.5)
HDLC SERPL-MCNC: 46 MG/DL (ref 40–60)
HGB BLD-MCNC: 16 G/DL (ref 13.5–17.5)
INR BLD: 3.8
LDLC SERPL CALC-MCNC: 94 MG/DL
LYMPHOCYTES # BLD: 1.6 K/UL (ref 1–5.1)
LYMPHOCYTES NFR BLD: 27 %
MCH RBC QN AUTO: 31.2 PG (ref 26–34)
MCHC RBC AUTO-ENTMCNC: 34.7 G/DL (ref 31–36)
MCV RBC AUTO: 90 FL (ref 80–100)
MONOCYTES # BLD: 0.4 K/UL (ref 0–1.3)
MONOCYTES NFR BLD: 6.4 %
NEUTROPHILS # BLD: 3.5 K/UL (ref 1.7–7.7)
NEUTROPHILS NFR BLD: 61 %
PLATELET # BLD AUTO: 246 K/UL (ref 135–450)
PMV BLD AUTO: 8.8 FL (ref 5–10.5)
POTASSIUM SERPL-SCNC: 4.4 MMOL/L (ref 3.5–5.1)
PROT SERPL-MCNC: 7.7 G/DL (ref 6.4–8.2)
PSA SERPL DL<=0.01 NG/ML-MCNC: 0.6 NG/ML (ref 0–4)
RBC # BLD AUTO: 5.13 M/UL (ref 4.2–5.9)
SODIUM SERPL-SCNC: 144 MMOL/L (ref 136–145)
TRIGL SERPL-MCNC: 124 MG/DL (ref 0–150)
VLDLC SERPL CALC-MCNC: 25 MG/DL
WBC # BLD AUTO: 5.8 K/UL (ref 4–11)

## 2024-07-03 PROCEDURE — 84153 ASSAY OF PSA TOTAL: CPT

## 2024-07-03 PROCEDURE — 83036 HEMOGLOBIN GLYCOSYLATED A1C: CPT

## 2024-07-03 PROCEDURE — 80061 LIPID PANEL: CPT

## 2024-07-03 PROCEDURE — 99211 OFF/OP EST MAY X REQ PHY/QHP: CPT

## 2024-07-03 PROCEDURE — 85610 PROTHROMBIN TIME: CPT

## 2024-07-03 PROCEDURE — 36415 COLL VENOUS BLD VENIPUNCTURE: CPT

## 2024-07-03 PROCEDURE — 80053 COMPREHEN METABOLIC PANEL: CPT

## 2024-07-03 PROCEDURE — 85025 COMPLETE CBC W/AUTO DIFF WBC: CPT

## 2024-07-03 NOTE — PROGRESS NOTES
Mr. Ferrara has hx of multiple PE s/p 11/2009 and 6/2012.  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   He presents today w/out complaint.  Pt. denies any s/sx bleeding/bruising/ swelling/SOB.   Pt states he does get occasional nose bleeds.   No missed doses.  No changes in Rx/OTC/herbal medications.  Pt denies EtOH use.    Pt reports no changes .    INR 3.8 is above goal range of 2-3.  Recommend to hold today then continue Warfarin 7.5 mg on Thur and 5 mg all other days.   Pt has 5 mg tablets  Will continue to monitor and check INR in 4 weeks.  Dosing reminder card given with phone number, appointment date and time  Return to clinic: 8/7  @ 9:30 am     Referring provider: Enrique Pike CNP  Referral Date: 4/21/23 - faxed for new referral      For Pharmacy Admin Tracking Only    Intervention Detail:   Total # of Interventions Recommended: 1  Total # of Interventions Accepted: 1  Time Spent (min): 15

## 2024-07-04 LAB
EST. AVERAGE GLUCOSE BLD GHB EST-MCNC: 116.9 MG/DL
HBA1C MFR BLD: 5.7 %

## 2024-08-01 RX ORDER — WARFARIN SODIUM 5 MG/1
TABLET ORAL
Qty: 24 TABLET | Refills: 2 | Status: SHIPPED | OUTPATIENT
Start: 2024-08-01

## 2024-08-01 NOTE — TELEPHONE ENCOUNTER
Refill request for  medication.     WARFARIN 5 MG     Name of Pharmacy- [facility]      Last visit - 04/25/2024     Pending visit - 10/24/2024    Last refill -6/21/2024              Additional Comments

## 2024-08-07 ENCOUNTER — ANTI-COAG VISIT (OUTPATIENT)
Dept: PHARMACY | Age: 64
End: 2024-08-07
Payer: MEDICARE

## 2024-08-07 DIAGNOSIS — Z79.01 LONG TERM CURRENT USE OF ANTICOAGULANT: Primary | ICD-10-CM

## 2024-08-07 LAB — INR BLD: 2.9

## 2024-08-07 PROCEDURE — 85610 PROTHROMBIN TIME: CPT | Performed by: FAMILY MEDICINE

## 2024-08-07 PROCEDURE — 99211 OFF/OP EST MAY X REQ PHY/QHP: CPT | Performed by: FAMILY MEDICINE

## 2024-08-07 NOTE — PROGRESS NOTES
Mr. Ferrara has hx of multiple PE s/p 11/2009 and 6/2012.  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   He presents today w/out complaint.  Pt. denies any s/sx bleeding/bruising/ swelling/SOB.   Pt states he does get occasional nose bleeds.   No missed doses.  No changes in Rx/OTC/herbal medications.  Pt denies EtOH use.    Pt reports no changes .    INR 2.9 is within goal range of 2-3.  Recommend to continue Warfarin 7.5 mg on Thur and 5 mg all other days.   Pt has 5 mg tablets  Will continue to monitor and check INR in 4 weeks.  Dosing reminder card given with phone number, appointment date and time  Return to clinic: 9/9 @ 945 am     Referring provider: Enrique Pike CNP  Referral Date: 4/16/24     Cade Andrews, PharmD 8/7/2024 9:36 AM      For Pharmacy Admin Tracking Only    Intervention Detail:   Total # of Interventions Recommended: 1  Total # of Interventions Accepted: 1  Time Spent (min): 15

## 2024-09-09 ENCOUNTER — ANTI-COAG VISIT (OUTPATIENT)
Dept: PHARMACY | Age: 64
End: 2024-09-09
Payer: MEDICARE

## 2024-09-09 DIAGNOSIS — Z79.01 LONG TERM CURRENT USE OF ANTICOAGULANT: Primary | ICD-10-CM

## 2024-09-09 LAB
INTERNATIONAL NORMALIZATION RATIO, POC: 3.1
PROTHROMBIN TIME, POC: 0

## 2024-09-09 PROCEDURE — 85610 PROTHROMBIN TIME: CPT | Performed by: FAMILY MEDICINE

## 2024-09-09 PROCEDURE — 99211 OFF/OP EST MAY X REQ PHY/QHP: CPT | Performed by: FAMILY MEDICINE

## 2024-09-18 RX ORDER — WARFARIN SODIUM 5 MG/1
TABLET ORAL
Qty: 24 TABLET | Refills: 2 | Status: SHIPPED | OUTPATIENT
Start: 2024-09-18

## 2024-10-07 ENCOUNTER — ANTI-COAG VISIT (OUTPATIENT)
Dept: PHARMACY | Age: 64
End: 2024-10-07
Payer: MEDICARE

## 2024-10-07 DIAGNOSIS — Z79.01 LONG TERM CURRENT USE OF ANTICOAGULANT: Primary | ICD-10-CM

## 2024-10-07 LAB
INTERNATIONAL NORMALIZATION RATIO, POC: 3
PROTHROMBIN TIME, POC: 0

## 2024-10-07 PROCEDURE — 85610 PROTHROMBIN TIME: CPT

## 2024-10-07 PROCEDURE — 99211 OFF/OP EST MAY X REQ PHY/QHP: CPT

## 2024-10-07 NOTE — PROGRESS NOTES
Mr. Ferrara has hx of multiple PE s/p 11/2009 and 6/2012.PMH consist of degenerative Joint disease in his right knee, Right upper lobe lung removal (histoplasmosis), and GERD, here for anticoagulation monitoring. He presents today w/out complaint.    Pt verifies dosing regimen  Pt denies missed/extra doses  Pt denies any s/sx bleeding/bruising/ swelling/SOB.   Pt denies getting nosebleeds - reports not in a long time  Pt denies changes in Rx/OTC/herbal medications.  Pt denies EtOH use.  Pt reports eating out more this week and not having as many greens    INR 3.0 is within goal range of 2-3.  Recommend to continue Warfarin 7.5 mg on Thur and 5 mg all other days.   Pt has 5 mg tablets  Will continue to monitor and check INR in 4 weeks.  Dosing reminder card given with phone number, appointment date and time  Return to clinic: 11/4/24 @ 10:15 am     Referring provider: Enrique Pike CNP  Referral Date: 4/16/24     Bernabe GarciaD 10/7/2024 10:03 AM    For Pharmacy Admin Tracking Only  Intervention Detail:   Total # of Interventions Recommended: 0  Total # of Interventions Accepted: 0  Time Spent (min): 15

## 2024-10-24 ENCOUNTER — OFFICE VISIT (OUTPATIENT)
Dept: INTERNAL MEDICINE CLINIC | Age: 64
End: 2024-10-24
Payer: MEDICARE

## 2024-10-24 VITALS
SYSTOLIC BLOOD PRESSURE: 120 MMHG | BODY MASS INDEX: 29.13 KG/M2 | WEIGHT: 239.2 LBS | TEMPERATURE: 97.9 F | HEART RATE: 76 BPM | DIASTOLIC BLOOD PRESSURE: 78 MMHG | OXYGEN SATURATION: 98 % | HEIGHT: 76 IN

## 2024-10-24 DIAGNOSIS — Z79.01 LONG TERM CURRENT USE OF ANTICOAGULANT: ICD-10-CM

## 2024-10-24 DIAGNOSIS — I10 HTN (HYPERTENSION), BENIGN: ICD-10-CM

## 2024-10-24 DIAGNOSIS — E78.2 MIXED HYPERLIPIDEMIA: ICD-10-CM

## 2024-10-24 DIAGNOSIS — Z13.6 SCREENING FOR AAA (ABDOMINAL AORTIC ANEURYSM): ICD-10-CM

## 2024-10-24 DIAGNOSIS — Z23 NEED FOR INFLUENZA VACCINATION: Primary | ICD-10-CM

## 2024-10-24 DIAGNOSIS — I48.0 PAF (PAROXYSMAL ATRIAL FIBRILLATION) (HCC): ICD-10-CM

## 2024-10-24 DIAGNOSIS — D68.69 SECONDARY HYPERCOAGULABLE STATE (HCC): ICD-10-CM

## 2024-10-24 DIAGNOSIS — Z23 NEED FOR VACCINATION AGAINST STREPTOCOCCUS PNEUMONIAE: ICD-10-CM

## 2024-10-24 PROCEDURE — 1036F TOBACCO NON-USER: CPT | Performed by: NURSE PRACTITIONER

## 2024-10-24 PROCEDURE — 99214 OFFICE O/P EST MOD 30 MIN: CPT | Performed by: NURSE PRACTITIONER

## 2024-10-24 PROCEDURE — G8482 FLU IMMUNIZE ORDER/ADMIN: HCPCS | Performed by: NURSE PRACTITIONER

## 2024-10-24 PROCEDURE — 3074F SYST BP LT 130 MM HG: CPT | Performed by: NURSE PRACTITIONER

## 2024-10-24 PROCEDURE — 3017F COLORECTAL CA SCREEN DOC REV: CPT | Performed by: NURSE PRACTITIONER

## 2024-10-24 PROCEDURE — 1123F ACP DISCUSS/DSCN MKR DOCD: CPT | Performed by: NURSE PRACTITIONER

## 2024-10-24 PROCEDURE — 90653 IIV ADJUVANT VACCINE IM: CPT | Performed by: NURSE PRACTITIONER

## 2024-10-24 PROCEDURE — 3078F DIAST BP <80 MM HG: CPT | Performed by: NURSE PRACTITIONER

## 2024-10-24 PROCEDURE — G0008 ADMIN INFLUENZA VIRUS VAC: HCPCS | Performed by: NURSE PRACTITIONER

## 2024-10-24 PROCEDURE — G0009 ADMIN PNEUMOCOCCAL VACCINE: HCPCS | Performed by: NURSE PRACTITIONER

## 2024-10-24 PROCEDURE — 90677 PCV20 VACCINE IM: CPT | Performed by: NURSE PRACTITIONER

## 2024-10-24 PROCEDURE — G8427 DOCREV CUR MEDS BY ELIG CLIN: HCPCS | Performed by: NURSE PRACTITIONER

## 2024-10-24 PROCEDURE — G8419 CALC BMI OUT NRM PARAM NOF/U: HCPCS | Performed by: NURSE PRACTITIONER

## 2024-10-24 SDOH — ECONOMIC STABILITY: FOOD INSECURITY: WITHIN THE PAST 12 MONTHS, YOU WORRIED THAT YOUR FOOD WOULD RUN OUT BEFORE YOU GOT MONEY TO BUY MORE.: NEVER TRUE

## 2024-10-24 SDOH — ECONOMIC STABILITY: INCOME INSECURITY: HOW HARD IS IT FOR YOU TO PAY FOR THE VERY BASICS LIKE FOOD, HOUSING, MEDICAL CARE, AND HEATING?: NOT HARD AT ALL

## 2024-10-24 SDOH — ECONOMIC STABILITY: FOOD INSECURITY: WITHIN THE PAST 12 MONTHS, THE FOOD YOU BOUGHT JUST DIDN'T LAST AND YOU DIDN'T HAVE MONEY TO GET MORE.: NEVER TRUE

## 2024-10-24 ASSESSMENT — ENCOUNTER SYMPTOMS
CHEST TIGHTNESS: 0
CONSTIPATION: 0
DIARRHEA: 0
COUGH: 0
SORE THROAT: 0
VOMITING: 0
NAUSEA: 0
SHORTNESS OF BREATH: 0
SINUS PAIN: 0

## 2024-10-24 NOTE — PROGRESS NOTES
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    Lung mass     Lung nodule 06/03/2012    histoplasmosis    Personal history of DVT (deep vein thrombosis) 05/09/2014    S/P thoracotomy 07/09/2012    Right thoracotomy, RUL excisional biopsy of nodule with FS, 5 level intercostal nerve block         Subdural hematoma 06/17/2020    Syncope and collapse 05/12/2014        Past Surgical History:   Procedure Laterality Date    APPENDECTOMY      1996    CARDIAC SURGERY  05/12/14    COLONOSCOPY      COLONOSCOPY N/A 01/24/2019    COLONOSCOPY performed by Hermann León MD at [facility] ENDOSCOPY    CORONARY ANGIOPLASTY  05/12/2014    WNL  - no stents    ENDOSCOPY, COLON, DIAGNOSTIC      EYE SURGERY      laser surgery     FOOT SURGERY Right     bone spur    KNEE ARTHROSCOPY Left     X 2    THORACOTOMY  07/09/2012    Right thoracotomy, right upper lobe excisional biopsy with frozen section 5 level intercostal nerve block    TONSILLECTOMY         Family History   Problem Relation Age of Onset    Diabetes Mother     Parkinsonism Mother     Cancer Mother         MASTECTOMY, BREAST CA, SPREAD    Heart Disease Father         CHF    Heart Disease Brother         CABG 3 VESSELLS    Other Brother         CHROHN'S DISEASE       Social History     Tobacco Use    Smoking status: Former     Current packs/day: 0.00     Average packs/day: 1 pack/day for 25.4 years (25.4 ttl

## 2024-11-04 ENCOUNTER — ANTI-COAG VISIT (OUTPATIENT)
Dept: PHARMACY | Age: 64
End: 2024-11-04
Payer: MEDICARE

## 2024-11-04 DIAGNOSIS — Z79.01 LONG TERM CURRENT USE OF ANTICOAGULANT: Primary | ICD-10-CM

## 2024-11-04 LAB
INTERNATIONAL NORMALIZATION RATIO, POC: 2.7
PROTHROMBIN TIME, POC: 0

## 2024-11-04 PROCEDURE — 99211 OFF/OP EST MAY X REQ PHY/QHP: CPT

## 2024-11-04 PROCEDURE — 85610 PROTHROMBIN TIME: CPT

## 2024-11-04 NOTE — PROGRESS NOTES
Mr. Ferrara has hx of multiple PE s/p 11/2009 and 6/2012.PMH consist of degenerative Joint disease in his right knee, Right upper lobe lung removal (histoplasmosis), and GERD, here for anticoagulation monitoring. He presents today w/out complaint.    Pt verifies dosing regimen  Pt denies missed/extra doses  Pt denies any s/sx bleeding/bruising/ swelling/SOB/CP/HA.   Pt denies getting nosebleeds - reports not in a long time  Pt denies changes in Rx/OTC/herbal medications.  Pt denies EtOH use.  Pt reports eating out more this week and not having as many greens    INR 2.7 is within goal range of 2-3.  Recommend to continue Warfarin 7.5 mg on Thur and 5 mg all other days.   Pt has 5 mg tablets  Will continue to monitor and check INR in 4 weeks.  Dosing reminder card given with phone number, appointment date and time  Return to clinic: 12/4/24 @ 10:15 am     Referring provider: Enrique Pike CNP  Referral Date: 4/16/24     Bernabe GarciaD 11/4/2024 10:13 AM    For Pharmacy Admin Tracking Only  Intervention Detail:   Total # of Interventions Recommended: 0  Total # of Interventions Accepted: 0  Time Spent (min): 15

## 2024-11-19 RX ORDER — WARFARIN SODIUM 5 MG/1
TABLET ORAL
Qty: 24 TABLET | Refills: 2 | Status: SHIPPED | OUTPATIENT
Start: 2024-11-19

## 2024-11-19 NOTE — TELEPHONE ENCOUNTER
Refill request for warfarin (COUMADIN) 5 MG tab  medication.     [facility]      Last visit - 299235     Pending visit - 201904    Last refill -[id number]      Medication Contract signed -  Last Oarrs ran-         Additional Comments

## 2024-12-04 ENCOUNTER — ANTI-COAG VISIT (OUTPATIENT)
Dept: PHARMACY | Age: 64
End: 2024-12-04
Payer: MEDICARE

## 2024-12-04 DIAGNOSIS — Z79.01 LONG TERM CURRENT USE OF ANTICOAGULANT: Primary | ICD-10-CM

## 2024-12-04 LAB
INTERNATIONAL NORMALIZATION RATIO, POC: 2.7
PROTHROMBIN TIME, POC: 0

## 2024-12-04 PROCEDURE — 99211 OFF/OP EST MAY X REQ PHY/QHP: CPT

## 2024-12-04 PROCEDURE — 85610 PROTHROMBIN TIME: CPT

## 2025-01-08 ENCOUNTER — ANTI-COAG VISIT (OUTPATIENT)
Dept: PHARMACY | Age: 65
End: 2025-01-08
Payer: MEDICARE

## 2025-01-08 DIAGNOSIS — Z79.01 LONG TERM CURRENT USE OF ANTICOAGULANT: Primary | ICD-10-CM

## 2025-01-08 LAB
INTERNATIONAL NORMALIZATION RATIO, POC: 2.8
PROTHROMBIN TIME, POC: 0

## 2025-01-08 PROCEDURE — 85610 PROTHROMBIN TIME: CPT

## 2025-01-08 PROCEDURE — 99211 OFF/OP EST MAY X REQ PHY/QHP: CPT

## 2025-01-08 NOTE — PROGRESS NOTES
Mr. Ferrara has hx of multiple PE s/p 11/2009 and 6/2012.PMH consist of degenerative Joint disease in his right knee, Right upper lobe lung removal (histoplasmosis), and GERD, here for anticoagulation monitoring.     Patient presents today w/out complaint.  Patient verifies dosing regimen  Patient denies missed/extra doses  Patient denies any s/sx bleeding/bruising/ swelling/SOB/CP/HA.   Patient denies getting nosebleeds - reports not in a long time  Patient denies changes in Rx/OTC/herbal medications.  Patient denies EtOH use.  Patient reports eating out more this week and not having as many greens    INR 2.8 is within goal range of 2-3.  Recommend to continue Warfarin 7.5 mg on Thur and 5 mg all other days.   Patient has 5 mg tablets  Will continue to monitor and check INR in 4 weeks.  Dosing reminder card given with phone number, appointment date and time  Return to clinic: 2/5/2025    Referring provider: Enrique Pike CNP  Referral Date: 4/16/24     Avi Lambert PharmD 1/8/2025 9:51 AM    For Pharmacy Admin Tracking Only  Intervention Detail:   Total # of Interventions Recommended: 0  Total # of Interventions Accepted: 0  Time Spent (min): 15

## 2025-01-21 RX ORDER — WARFARIN SODIUM 5 MG/1
TABLET ORAL
Qty: 24 TABLET | Refills: 2 | Status: SHIPPED | OUTPATIENT
Start: 2025-01-21

## 2025-01-21 NOTE — TELEPHONE ENCOUNTER
Refill request for warfarin (COUMADIN) 5 MG tablet  medication.     Name of [facility]acy- [facility]      Last visit - [id number]     Pending visit - [id number]    Last refill -[id number]      Medication Contract signed -  Last Oarrs ran-         Additional Comments

## 2025-02-05 ENCOUNTER — ANTI-COAG VISIT (OUTPATIENT)
Dept: PHARMACY | Age: 65
End: 2025-02-05
Payer: MEDICARE

## 2025-02-05 DIAGNOSIS — D68.69 SECONDARY HYPERCOAGULABLE STATE (HCC): ICD-10-CM

## 2025-02-05 DIAGNOSIS — Z79.01 LONG TERM CURRENT USE OF ANTICOAGULANT: Primary | ICD-10-CM

## 2025-02-05 LAB
INTERNATIONAL NORMALIZATION RATIO, POC: 2.7
PROTHROMBIN TIME, POC: 0

## 2025-02-05 PROCEDURE — 85610 PROTHROMBIN TIME: CPT

## 2025-02-05 PROCEDURE — 99211 OFF/OP EST MAY X REQ PHY/QHP: CPT

## 2025-02-05 NOTE — PROGRESS NOTES
Mr. Pete Ferrara is a 65 y.o. y/o male with history of PE (2009, 2012) who presents today for anticoagulation monitoring and adjustment.     Pertinent PMH: GERD, HLD, PAF, HTN    Patient Reported Findings:  Yes     No  [x]   []       Patient verifies current dosing regimen as listed  - Warfarin 7.5 mg on Thur and 5 mg all other days.   - Patient has  warfarin 5 mg tablets  []   [x]       S/Sx bleeding/bruising/swelling/SOB/CP  []   [x]       Blood in urine or stool  []   [x]       Procedures scheduled in the future at this time  []   [x]       Missed Dose  []   [x]       Extra Dose  []   [x]       Change in medications  []   [x]       Change in health/diet/appetite  []   [x]       Change in alcohol use  - Patient denies EtOH use.  []   [x]       Change in activity  []   [x]       Hospital admission  []   [x]       Emergency department visit  []   [x]       Other complaints    Clinical Outcomes:  Yes     No  []   [x]       Major bleeding event  []   [x]       Thromboembolic event    INR (no units)   Date Value   08/07/2024 2.9   07/03/2024 3.8   06/05/2024 2.8   02/14/2024 2.0     INR,(POC) (no units)   Date Value   02/05/2025 2.7   01/08/2025 2.8   12/04/2024 2.7   11/04/2024 2.7     Duration of warfarin Therapy: Indefinite  INR Range:  2.0-3.0      INR 2.7 is within goal range of 2-3.  Recommend to continue Warfarin 7.5 mg on Thur and 5 mg all other days.   Will continue to monitor and check INR in 4 weeks.  Dosing reminder card given with phone number, appointment date and time  Return to clinic: 3/10/2025    Referring provider: Enrique Pike CNP  Referral Date: 4/16/24     Avi Lambert PharmD 2/5/2025 9:59 AM    For Pharmacy Admin Tracking Only  Intervention Detail:   Total # of Interventions Recommended: 0  Total # of Interventions Accepted: 0  Time Spent (min): 15

## 2025-02-10 DIAGNOSIS — E78.2 MIXED HYPERLIPIDEMIA: ICD-10-CM

## 2025-02-10 RX ORDER — ATORVASTATIN CALCIUM 40 MG/1
40 TABLET, FILM COATED ORAL NIGHTLY
Qty: 90 TABLET | Refills: 3 | Status: SHIPPED | OUTPATIENT
Start: 2025-02-10

## 2025-02-10 NOTE — TELEPHONE ENCOUNTER
Refill request for Atorvastatin medication.     Name of [facility]acy- [facility]      Last visit - 10/24/2024     Pending visit - 04/24/2025    Last refill -02/19/2024

## 2025-03-10 ENCOUNTER — ANTI-COAG VISIT (OUTPATIENT)
Dept: PHARMACY | Age: 65
End: 2025-03-10
Payer: MEDICARE

## 2025-03-10 DIAGNOSIS — D68.69 SECONDARY HYPERCOAGULABLE STATE: ICD-10-CM

## 2025-03-10 DIAGNOSIS — Z79.01 LONG TERM CURRENT USE OF ANTICOAGULANT: Primary | ICD-10-CM

## 2025-03-10 LAB
INTERNATIONAL NORMALIZATION RATIO, POC: 2.9
PROTHROMBIN TIME, POC: 0

## 2025-03-10 PROCEDURE — 85610 PROTHROMBIN TIME: CPT

## 2025-03-10 PROCEDURE — 99211 OFF/OP EST MAY X REQ PHY/QHP: CPT

## 2025-03-10 NOTE — PROGRESS NOTES
Mr. Pete Ferrara is a 65 y.o. y/o male with history of PE (2009, 2012) who presents today for anticoagulation monitoring and adjustment.     Pertinent PMH: GERD, HLD, PAF, HTN    Patient Reported Findings:  Yes     No  [x]   []       Patient verifies current dosing regimen as listed  - Warfarin 7.5 mg on Thur and 5 mg all other days.   - Patient has  warfarin 5 mg tablets  []   [x]       S/Sx bleeding/bruising/swelling/SOB/CP  []   [x]       Blood in urine or stool  []   [x]       Procedures scheduled in the future at this time  []   [x]       Missed Dose  []   [x]       Extra Dose  []   [x]       Change in medications  []   [x]       Change in health/diet/appetite  []   [x]       Change in alcohol use  - Patient denies EtOH use.  []   [x]       Change in activity  []   [x]       Hospital admission  []   [x]       Emergency department visit  []   [x]       Other complaints    Clinical Outcomes:  Yes     No  []   [x]       Major bleeding event  []   [x]       Thromboembolic event    INR (no units)   Date Value   08/07/2024 2.9   07/03/2024 3.8   06/05/2024 2.8   02/14/2024 2.0     INR,(POC) (no units)   Date Value   02/05/2025 2.7   01/08/2025 2.8   12/04/2024 2.7   11/04/2024 2.7     Duration of warfarin Therapy: Indefinite  INR Range:  2.0-3.0        INR 2.9 is within goal range of 2-3.  Recommend to continue Warfarin 7.5 mg on Thur and 5 mg all other days.   Will continue to monitor and check INR in 4 weeks.  AVS printed and reviewed with patient  Return to clinic: 3/10/2025    Referring provider: Enrique Pike CNP  Referral Date: 4/16/24     Bernabe GarciaD 3/10/2025 10:24 AM          For Pharmacy Admin Tracking Only  Intervention Detail:   Total # of Interventions Recommended: 0  Total # of Interventions Accepted: 0  Time Spent (min): 15   Xenograft Text: The defect edges were debeveled with a #15 scalpel blade.  Given the location of the defect, shape of the defect and the proximity to free margins a xenograft was deemed most appropriate.  The graft was then trimmed to fit the size of the defect.  The graft was then placed in the primary defect and oriented appropriately.

## 2025-04-07 ENCOUNTER — ANTI-COAG VISIT (OUTPATIENT)
Dept: PHARMACY | Age: 65
End: 2025-04-07
Payer: MEDICARE

## 2025-04-07 DIAGNOSIS — Z79.01 LONG TERM CURRENT USE OF ANTICOAGULANT: Primary | ICD-10-CM

## 2025-04-07 DIAGNOSIS — D68.69 SECONDARY HYPERCOAGULABLE STATE: ICD-10-CM

## 2025-04-07 LAB
INTERNATIONAL NORMALIZATION RATIO, POC: 3
PROTHROMBIN TIME, POC: 0

## 2025-04-07 PROCEDURE — 85610 PROTHROMBIN TIME: CPT

## 2025-04-07 PROCEDURE — 99211 OFF/OP EST MAY X REQ PHY/QHP: CPT

## 2025-04-24 ENCOUNTER — OFFICE VISIT (OUTPATIENT)
Dept: INTERNAL MEDICINE CLINIC | Age: 65
End: 2025-04-24
Payer: MEDICARE

## 2025-04-24 VITALS
SYSTOLIC BLOOD PRESSURE: 116 MMHG | HEIGHT: 76 IN | DIASTOLIC BLOOD PRESSURE: 76 MMHG | OXYGEN SATURATION: 98 % | WEIGHT: 238.6 LBS | HEART RATE: 91 BPM | BODY MASS INDEX: 29.06 KG/M2 | TEMPERATURE: 98.2 F

## 2025-04-24 DIAGNOSIS — R06.02 SHORTNESS OF BREATH: ICD-10-CM

## 2025-04-24 DIAGNOSIS — Z79.01 LONG TERM CURRENT USE OF ANTICOAGULANT: ICD-10-CM

## 2025-04-24 DIAGNOSIS — I10 HTN (HYPERTENSION), BENIGN: ICD-10-CM

## 2025-04-24 DIAGNOSIS — Z00.00 INITIAL MEDICARE ANNUAL WELLNESS VISIT: Primary | ICD-10-CM

## 2025-04-24 DIAGNOSIS — N42.9 DISORDER OF PROSTATE, UNSPECIFIED: ICD-10-CM

## 2025-04-24 DIAGNOSIS — Z12.5 SCREENING FOR MALIGNANT NEOPLASM OF PROSTATE: ICD-10-CM

## 2025-04-24 DIAGNOSIS — I48.0 PAF (PAROXYSMAL ATRIAL FIBRILLATION) (HCC): ICD-10-CM

## 2025-04-24 DIAGNOSIS — E78.2 MIXED HYPERLIPIDEMIA: ICD-10-CM

## 2025-04-24 DIAGNOSIS — R73.01 IFG (IMPAIRED FASTING GLUCOSE): ICD-10-CM

## 2025-04-24 DIAGNOSIS — Z91.030 BEE STING ALLERGY: ICD-10-CM

## 2025-04-24 PROCEDURE — 1159F MED LIST DOCD IN RCRD: CPT | Performed by: NURSE PRACTITIONER

## 2025-04-24 PROCEDURE — 3017F COLORECTAL CA SCREEN DOC REV: CPT | Performed by: NURSE PRACTITIONER

## 2025-04-24 PROCEDURE — 1160F RVW MEDS BY RX/DR IN RCRD: CPT | Performed by: NURSE PRACTITIONER

## 2025-04-24 PROCEDURE — 3078F DIAST BP <80 MM HG: CPT | Performed by: NURSE PRACTITIONER

## 2025-04-24 PROCEDURE — 1123F ACP DISCUSS/DSCN MKR DOCD: CPT | Performed by: NURSE PRACTITIONER

## 2025-04-24 PROCEDURE — G0438 PPPS, INITIAL VISIT: HCPCS | Performed by: NURSE PRACTITIONER

## 2025-04-24 PROCEDURE — 3074F SYST BP LT 130 MM HG: CPT | Performed by: NURSE PRACTITIONER

## 2025-04-24 RX ORDER — ALBUTEROL SULFATE 90 UG/1
2 INHALANT RESPIRATORY (INHALATION) 4 TIMES DAILY PRN
Qty: 18 G | Refills: 2 | Status: SHIPPED | OUTPATIENT
Start: 2025-04-24

## 2025-04-24 SDOH — ECONOMIC STABILITY: FOOD INSECURITY: WITHIN THE PAST 12 MONTHS, YOU WORRIED THAT YOUR FOOD WOULD RUN OUT BEFORE YOU GOT MONEY TO BUY MORE.: NEVER TRUE

## 2025-04-24 SDOH — ECONOMIC STABILITY: FOOD INSECURITY: WITHIN THE PAST 12 MONTHS, THE FOOD YOU BOUGHT JUST DIDN'T LAST AND YOU DIDN'T HAVE MONEY TO GET MORE.: NEVER TRUE

## 2025-04-24 ASSESSMENT — PATIENT HEALTH QUESTIONNAIRE - PHQ9
10. IF YOU CHECKED OFF ANY PROBLEMS, HOW DIFFICULT HAVE THESE PROBLEMS MADE IT FOR YOU TO DO YOUR WORK, TAKE CARE OF THINGS AT HOME, OR GET ALONG WITH OTHER PEOPLE: NOT DIFFICULT AT ALL
SUM OF ALL RESPONSES TO PHQ QUESTIONS 1-9: 6
SUM OF ALL RESPONSES TO PHQ QUESTIONS 1-9: 6
6. FEELING BAD ABOUT YOURSELF - OR THAT YOU ARE A FAILURE OR HAVE LET YOURSELF OR YOUR FAMILY DOWN: NOT AT ALL
5. POOR APPETITE OR OVEREATING: NOT AT ALL
3. TROUBLE FALLING OR STAYING ASLEEP: SEVERAL DAYS
8. MOVING OR SPEAKING SO SLOWLY THAT OTHER PEOPLE COULD HAVE NOTICED. OR THE OPPOSITE, BEING SO FIGETY OR RESTLESS THAT YOU HAVE BEEN MOVING AROUND A LOT MORE THAN USUAL: NEARLY EVERY DAY
1. LITTLE INTEREST OR PLEASURE IN DOING THINGS: MORE THAN HALF THE DAYS
9. THOUGHTS THAT YOU WOULD BE BETTER OFF DEAD, OR OF HURTING YOURSELF: NOT AT ALL
SUM OF ALL RESPONSES TO PHQ QUESTIONS 1-9: 6
2. FEELING DOWN, DEPRESSED OR HOPELESS: NOT AT ALL
4. FEELING TIRED OR HAVING LITTLE ENERGY: NOT AT ALL
SUM OF ALL RESPONSES TO PHQ QUESTIONS 1-9: 6
7. TROUBLE CONCENTRATING ON THINGS, SUCH AS READING THE NEWSPAPER OR WATCHING TELEVISION: NOT AT ALL

## 2025-04-24 ASSESSMENT — LIFESTYLE VARIABLES
HOW OFTEN DO YOU HAVE A DRINK CONTAINING ALCOHOL: NEVER
HOW MANY STANDARD DRINKS CONTAINING ALCOHOL DO YOU HAVE ON A TYPICAL DAY: PATIENT DOES NOT DRINK

## 2025-04-24 NOTE — PROGRESS NOTES
Medicare Annual Wellness Visit    Pete Ferrara is here for Medicare AWV (AWV  Discuss getting an inhaler and Epi Pen )    Assessment & Plan  1. Medication management.  His vital signs are within normal limits, and his blood pressure is well-regulated. His current medications, including Lipitor and Coumadin, appear to be effective.  The most recent set of screening labs was conducted in 07/2024.  He is advised to continue his current medication regimen.  Updated screening labs will be ordered for a future date.    2. Bee sting allergy.  He has a history of swelling due to bee stings and has used an EpiPen in the past.  An EpiPen prescription will be provided.  He is advised to inform us if the cost of the EpiPen is prohibitive.    3. Breathing issues.  He experiences breathing problems while working and running up and down stairs.  A prescription for an albuterol inhaler will be provided.  He is advised to monitor his need for the inhaler. If he finds himself requiring the inhaler more frequently, he should inform us so that a more potent medication can be considered.    Initial Medicare annual wellness visit  PAF (paroxysmal atrial fibrillation) (HCC)  HTN (hypertension), benign  -     Comprehensive Metabolic Panel; Future  Mixed hyperlipidemia  -     Lipid Panel; Future  Long term current use of anticoagulant  -     CBC with Auto Differential; Future  IFG (impaired fasting glucose)  -     Hemoglobin A1C; Future  Bee sting allergy  -     EPINEPHrine (SYMJEPI) 0.3 MG/0.3ML SOSY injection; Inject 0.3 mLs into the muscle as needed for Anaphylaxis, Disp-1 each, R-0Normal  Shortness of breath  Screening for malignant neoplasm of prostate  -     PSA, Prostatic Specific Antigen; Future  Disorder of prostate, unspecified  -     PSA, Prostatic Specific Antigen; Future       Return in 6 months (on 10/24/2025) for Multiple Chronic Condition/nursing home follow up (30 min).     Subjective     History of Present Illness  The patient

## 2025-04-25 ENCOUNTER — TELEPHONE (OUTPATIENT)
Dept: ADMINISTRATIVE | Age: 65
End: 2025-04-25

## 2025-04-25 NOTE — TELEPHONE ENCOUNTER
Submitted PA for EPINEPHrine 0.3MG/0.3ML auto-injectors   Via CMM Key: [id number] STATUS: PENDING.    Follow up done daily; if no decision with in three days we will refax.  If another three days goes by with no decision will call the insurance for status.

## 2025-04-28 NOTE — TELEPHONE ENCOUNTER
The medication is APPROVED UNTIL FURTHER NOTICE.    If this requires a response please respond to the pool ( P [facility] MEDICATION PRE-AUTH).      Thank you please advise patient.

## 2025-05-05 ENCOUNTER — ANTI-COAG VISIT (OUTPATIENT)
Dept: PHARMACY | Age: 65
End: 2025-05-05
Payer: MEDICARE

## 2025-05-05 DIAGNOSIS — D68.69 SECONDARY HYPERCOAGULABLE STATE: ICD-10-CM

## 2025-05-05 DIAGNOSIS — Z79.01 LONG TERM CURRENT USE OF ANTICOAGULANT: Primary | ICD-10-CM

## 2025-05-05 LAB
INTERNATIONAL NORMALIZATION RATIO, POC: 2.5
PROTHROMBIN TIME, POC: 0

## 2025-05-05 PROCEDURE — 85610 PROTHROMBIN TIME: CPT

## 2025-05-05 PROCEDURE — 99211 OFF/OP EST MAY X REQ PHY/QHP: CPT

## 2025-05-05 NOTE — PROGRESS NOTES
Mr. Pete Ferrara is a 65 y.o. y/o male with history of PE (2009, 2012) who presents today for anticoagulation monitoring and adjustment.     Pertinent PMH: GERD, HLD, PAF, HTN    Patient Reported Findings:  Yes     No  [x]   []       Patient verifies current dosing regimen as listed  - Warfarin 7.5 mg on Thur and 5 mg all other days.   - Patient has  warfarin 5 mg tablets  []   [x]       S/Sx bleeding/bruising/swelling/SOB/CP  []   [x]       Blood in urine or stool  []   [x]       Procedures scheduled in the future at this time  []   [x]       Missed Dose  []   [x]       Extra Dose  [x]   []       Change in medications  - New Rx for albuterol inhaler and EpiPen   []   [x]       Change in health/diet/appetite  []   [x]       Change in alcohol use  []   [x]       Change in activity  []   [x]       Hospital admission  []   [x]       Emergency department visit  []   [x]       Other complaints    Clinical Outcomes:  Yes     No  []   [x]       Major bleeding event  []   [x]       Thromboembolic event    INR (no units)   Date Value   08/07/2024 2.9   07/03/2024 3.8   06/05/2024 2.8   02/14/2024 2.0     INR,(POC) (no units)   Date Value   04/07/2025 3.0   03/10/2025 2.9   02/05/2025 2.7   01/08/2025 2.8     Duration of warfarin Therapy: Indefinite  INR Range:  2.0-3.0        INR 2.5 is WITHIN goal range of 2-3.  Recommend to continue Warfarin 7.5 mg on Thur and 5 mg all other days.   Will continue to monitor and check INR in 4 weeks.  AVS printed and reviewed with patient  Return to clinic: 6/2/2025    Referring provider: Enrique Pike CNP  Referral Date: 4/16/24 (referral faxed)  CPA: signed    Avi Lambert PharmD 5/5/2025 11:12 AM          For Pharmacy Admin Tracking Only  Intervention Detail:   Total # of Interventions Recommended: 0  Total # of Interventions Accepted: 0  Time Spent (min): 15

## 2025-05-27 RX ORDER — WARFARIN SODIUM 5 MG/1
TABLET ORAL
Qty: 24 TABLET | Refills: 2 | Status: SHIPPED | OUTPATIENT
Start: 2025-05-27

## 2025-05-27 NOTE — TELEPHONE ENCOUNTER
Refill request for WARFARIN medication.     Name of Pharmacy- [facility]      Last visit - 4/24/25     Pending visit - 10/23/25    Last refill -4/17/25      Medication Contract signed -   Last Oarrs ran-         Additional Comments

## 2025-06-03 DIAGNOSIS — D68.69 SECONDARY HYPERCOAGULABLE STATE: ICD-10-CM

## 2025-06-03 DIAGNOSIS — Z79.01 LONG TERM CURRENT USE OF ANTICOAGULANT: Primary | ICD-10-CM

## 2025-06-03 DIAGNOSIS — I48.0 PAF (PAROXYSMAL ATRIAL FIBRILLATION) (HCC): ICD-10-CM

## 2025-06-04 ENCOUNTER — ANTI-COAG VISIT (OUTPATIENT)
Dept: PHARMACY | Age: 65
End: 2025-06-04
Payer: MEDICARE

## 2025-06-04 DIAGNOSIS — D68.69 SECONDARY HYPERCOAGULABLE STATE: ICD-10-CM

## 2025-06-04 DIAGNOSIS — Z79.01 LONG TERM CURRENT USE OF ANTICOAGULANT: Primary | ICD-10-CM

## 2025-06-04 LAB
INTERNATIONAL NORMALIZATION RATIO, POC: 2.1
PROTHROMBIN TIME, POC: 0

## 2025-06-04 PROCEDURE — 99211 OFF/OP EST MAY X REQ PHY/QHP: CPT

## 2025-06-04 PROCEDURE — 85610 PROTHROMBIN TIME: CPT

## 2025-06-07 NOTE — PROGRESS NOTES
Patient discharge completed. IV and tele removed. Discharge information included information on diagnosis including signs and symptoms, complications and when to seek medical attention. Information on new medications also provided included use for the medication, side effects and when to call the doctor. Patient verbalized understanding of all discharge information. Patient escorted out by staff with all documented belongings.     Physical Therapy    Facility/Department: [facility] - MED SURG  Initial Assessment    NAME: Naya Menendez  : 1960  MRN: [id number]    Date of Service: 2022    Discharge Recommendations:  Home with Home health PT,Outpatient PT   PT Equipment Recommendations  Other: TBD pending progress  If pt discharges prior to next PT session this note will serve as discharge summary. Assessment   Body structures, Functions, Activity limitations: Decreased functional mobility ; Decreased endurance; Increased pain  Assessment: 58 yo male adm with dizziness,  headace, acute neuro deficit, hypocalcemia. PLOF: Amb indep, indep ADLs, Drives, works part time for [employer]. Today pt modified indep bed mob, CG for transfers and to amb with RW 25 ft. He is functioning well below baseline and will benefit from skilled PT to address deficits. Recommend home assist prn and OP PT to address deficits. Treatment Diagnosis: dreased gait and endurance, Headache, dizziness  Specific instructions for Next Treatment: progress ex and mobility  Prognosis: Good  Decision Making: Medium Complexity  PT Education: Goals;PT Role;Plan of Care;Home Exercise Program;General Safety; Disease Specific Education;Gait Training;Transfer Training  Patient Education: Disease specific: pt educated in signs/symptoms of stroke, general safety. He voices understanding  Barriers to Learning: none  REQUIRES PT FOLLOW UP: Yes  Activity Tolerance  Activity Tolerance: Patient Tolerated treatment well  Activity Tolerance: BP post amb 130/71  HR 80       Patient Diagnosis(es): The primary encounter diagnosis was Cerebrovascular accident (CVA), unspecified mechanism ([facility] [address] Diagnoses of Hypokalemia, Hyponatremia, Hypomagnesemia, and Hypocalcemia were also pertinent to this visit.      has a past medical history of Asthma, Cancer ([address] Deep vein thrombosis (DVT) ([facility] [address] FH: CAD (coronary artery disease), Former smoker, GERD (gastroesophageal reflux disease), High cholesterol, Hx of blood clots, Lung mass, Lung nodule, and S/P thoracotomy. has a past surgical history that includes Appendectomy; Tonsillectomy; Endoscopy, colon, diagnostic; Colonoscopy; eye surgery; thoracotomy (7-9-2012); Coronary angioplasty (05/12/14); Knee arthroscopy (Left); Foot surgery (Right); and Colonoscopy (N/A, 1/24/2019).     Restrictions  Restrictions/Precautions  Restrictions/Precautions: General Precautions,Up as Tolerated,Fall Risk  Vision/Hearing  Vision: Impaired  Vision Exceptions: Wears glasses at all times  Hearing: Within functional limits     Subjective  General  Chart Reviewed: Yes  Patient assessed for rehabilitation services?: Yes  Family / Caregiver Present: No  Referring Practitioner: Lucila Orourke  Referral Date : 01/07/22  Diagnosis: Acute neuro deficit, hypocalcemia, dizziness  Follows Commands: Within Functional Limits  General Comment  Comments: RN cleared pt for thrapy, pt restng in bed on approach  Subjective  Subjective: pt agrees to therpay  Pain Screening  Patient Currently in Pain: Yes  Pain Assessment  Pain Assessment: Faces  Ortiz-Baker Pain Rating: Hurts even more  Pain Type: Acute pain  Pain Location: Head  Pain Orientation: Anterior  Pain Descriptors: Aching  Pain Onset: On-going  Clinical Progression: Not changed  Functional Pain Assessment: Activities are not prevented  Non-Pharmaceutical Pain Intervention(s): Emotional support  Response to Pain Intervention: Patient Satisfied    Vital Signs  Pulse: 74  Heart Rate Source: Monitor  BP: 129/83  BP Location: Left upper arm  Patient Currently in Pain: Yes  Oxygen Therapy  SpO2: 99 %  Pulse Oximeter Device Mode: Intermittent  Pulse Oximeter Device Location: Finger  O2 Device: None (Room air)       Orientation  Orientation  Overall Orientation Status: Within Normal Limits  Social/Functional History  Social/Functional History  Lives With: Spouse,Son  Type of Home: House  Home Layout: Two level  Home Access: Stairs to enter without rails  Entrance Stairs - Number of Steps: 3  Bathroom Shower/Tub: Tub/Shower unit  Bathroom Toilet: Standard  Bathroom Equipment: Grab bars in shower  ADL Assistance: Independent  Ambulation Assistance: Independent  Transfer Assistance: Independent  Active : Yes  Mode of Transportation: Truck  Type of occupation: works for [employer], 1 day week      Objective      RLE AROM: WFL  LLE AROM : WFL  Strength RLE: WFL  Strength LLE: WFL (Strength LLE 5/5 on MMT, but much slower to respond when asked to move)        Bed mobility  Rolling to Right: Modified independent  Supine to Sit: Modified independent  Transfers  Sit to Stand: Contact guard assistance  Stand to sit: Contact guard assistance    Ambulation  Device: Rolling Walker  Assistance: Contact guard assistance  Quality of Gait: Slow pace, shortened stride, stopped for standing break 1 x due to head throbbing  Distance: 20 ft x 1  Comments: distance limited by throbbing headache        Exercises  Straight Leg Raise: 5 x B  Gluteal Sets: 5 x B  Hip Flexion: Standing slow march 5 x B  Hip Abduction: 5 x B  Ankle Pumps: 10 x B     Plan   Times per week: 3-5 x week  Specific instructions for Next Treatment: progress ex and mobility  Current Treatment Recommendations: Strengthening,Transfer Training,Endurance Training,Functional Mobility Training,Safety Education & Training,Gait Training  Safety Devices  Type of devices:  All fall risk precautions in place,Bed alarm in place,Patient at risk for falls,Nurse notified,Call light within reach,Left in bed (handoff to RN via phone)      AM-PAC Score  AM-PAC Inpatient Mobility Raw Score : 20 (01/08/22 0858)  AM-PAC Inpatient T-Scale Score : 47.67 (01/08/22 0858)  Mobility Inpatient CMS 0-100% Score: 35.83 (01/08/22 0858)  Mobility Inpatient CMS G-Code Modifier : CJ (01/08/22 2115)     Goals  Short term goals  Time Frame for Short term goals: 1 week (1/15)  Short term goal 1: pt to demonstrate modified indep transfers  Short term goal 2: pt to amb with least restrictive or no device 150 ft Supervised  Short term goal 3: pt to particiapte in LE Ex 12-15 reps by 1/13  Patient Goals   Patient goals : \"to get back to normal activity\"     Therapy Time   Individual Concurrent Group Co-treatment   Time In 0831         Time Out 0904         Minutes 33         Timed Code Treatment Minutes: [address] [employer], LAW

## 2025-07-16 ENCOUNTER — ANTI-COAG VISIT (OUTPATIENT)
Dept: PHARMACY | Age: 65
End: 2025-07-16
Payer: MEDICARE

## 2025-07-16 DIAGNOSIS — Z79.01 LONG TERM CURRENT USE OF ANTICOAGULANT: Primary | ICD-10-CM

## 2025-07-16 DIAGNOSIS — D68.69 SECONDARY HYPERCOAGULABLE STATE: ICD-10-CM

## 2025-07-16 LAB
INTERNATIONAL NORMALIZATION RATIO, POC: 3
PROTHROMBIN TIME, POC: 0

## 2025-07-16 PROCEDURE — 99211 OFF/OP EST MAY X REQ PHY/QHP: CPT

## 2025-07-16 PROCEDURE — 85610 PROTHROMBIN TIME: CPT

## 2025-07-16 NOTE — PROGRESS NOTES
Mr. Pete Ferrara is a 65 y.o. y/o male with history of PE (2009, 2012) who presents today for anticoagulation monitoring and adjustment.     Pertinent PMH: GERD, HLD, PAF, HTN    Patient Reported Findings:  Yes     No  [x]   []       Patient verifies current dosing regimen as listed  - Warfarin 7.5 mg on Thur and 5 mg all other days.   - Patient has  warfarin 5 mg tablets  [x]   []       S/Sx bleeding/bruising/swelling/SOB/CP  - Some bruising on legs (Patient reports he thinks he bumped them at work)  []   [x]       Blood in urine or stool  []   [x]       Procedures scheduled in the future at this time  []   [x]       Missed Dose  []   [x]       Extra Dose  []   [x]       Change in medications  []   [x]       Change in health/diet/appetite  []   [x]       Change in alcohol use  []   [x]       Change in activity  []   [x]       Hospital admission  []   [x]       Emergency department visit  [x]   []       Other complaints  - Now working Thur-Fri-Sat    Clinical Outcomes:  Yes     No  []   [x]       Major bleeding event  []   [x]       Thromboembolic event    INR (no units)   Date Value   08/07/2024 2.9   07/03/2024 3.8   06/05/2024 2.8   02/14/2024 2.0     INR,(POC) (no units)   Date Value   06/04/2025 2.1   05/05/2025 2.5   04/07/2025 3.0   03/10/2025 2.9     Duration of warfarin Therapy: Indefinite  INR Range:  2.0-3.0        INR 3.0 is WITHIN goal range of 2-3.  Recommend to continue Warfarin 7.5 mg on Thur and 5 mg all other days.   Will continue to monitor and check INR in 6 weeks.  Patient preference for 6 weeks given several consecutive therapeutic INRs  AVS printed and reviewed with patient  Return to clinic: 8/27/2025    Referring provider: Enrique Pike CNP  Referral Date: 6/3/2025  CPA: signed    Bernabe GarciaD           For Pharmacy Admin Tracking Only  Intervention Detail:   Total # of Interventions Recommended: 0  Total # of Interventions Accepted: 0  Time Spent (min): 15

## 2025-07-28 RX ORDER — WARFARIN SODIUM 5 MG/1
TABLET ORAL
Qty: 24 TABLET | Refills: 2 | Status: SHIPPED | OUTPATIENT
Start: 2025-07-28

## 2025-07-28 NOTE — TELEPHONE ENCOUNTER
Refill request for Warfarin medication.     Name of Pharmacy- [facility]      Last visit - 4/24/25     Pending visit - 08/27/25    Last refill -5/27/25

## 2025-08-27 ENCOUNTER — ANTI-COAG VISIT (OUTPATIENT)
Dept: PHARMACY | Age: 65
End: 2025-08-27
Payer: MEDICARE

## 2025-08-27 DIAGNOSIS — Z79.01 LONG TERM CURRENT USE OF ANTICOAGULANT: Primary | ICD-10-CM

## 2025-08-27 DIAGNOSIS — D68.69 SECONDARY HYPERCOAGULABLE STATE: ICD-10-CM

## 2025-08-27 LAB
INTERNATIONAL NORMALIZATION RATIO, POC: 2.8
PROTHROMBIN TIME, POC: NORMAL

## 2025-08-27 PROCEDURE — 99211 OFF/OP EST MAY X REQ PHY/QHP: CPT

## 2025-08-27 PROCEDURE — 85610 PROTHROMBIN TIME: CPT

## (undated) DEVICE — ENDO CARRY-ON PROCEDURE KIT INCLUDES SUCTION TUBING, LUBRICANT, GAUZE, BIOHAZARD STICKER, TRANSPORT PAD AND INTERCEPT BEDSIDE KIT.: Brand: ENDO CARRY-ON PROCEDURE KIT